# Patient Record
Sex: MALE | Race: WHITE | NOT HISPANIC OR LATINO | Employment: OTHER | URBAN - METROPOLITAN AREA
[De-identification: names, ages, dates, MRNs, and addresses within clinical notes are randomized per-mention and may not be internally consistent; named-entity substitution may affect disease eponyms.]

---

## 2017-06-30 ENCOUNTER — HOSPITAL ENCOUNTER (EMERGENCY)
Facility: HOSPITAL | Age: 57
Discharge: HOME | End: 2017-06-30
Attending: EMERGENCY MEDICINE | Admitting: EMERGENCY MEDICINE
Payer: COMMERCIAL

## 2017-06-30 VITALS
OXYGEN SATURATION: 97 % | DIASTOLIC BLOOD PRESSURE: 89 MMHG | SYSTOLIC BLOOD PRESSURE: 181 MMHG | TEMPERATURE: 97.3 F | RESPIRATION RATE: 18 BRPM | HEART RATE: 103 BPM | WEIGHT: 240.2 LBS

## 2017-06-30 DIAGNOSIS — E16.2 HYPOGLYCEMIA: Primary | ICD-10-CM

## 2017-06-30 LAB
ALBUMIN SERPL BCP-MCNC: 3 G/DL (ref 3.5–5)
ALP SERPL-CCNC: 102 U/L (ref 46–116)
ALT SERPL W P-5'-P-CCNC: 21 U/L (ref 12–78)
ANION GAP SERPL CALCULATED.3IONS-SCNC: 7 MMOL/L (ref 4–13)
APTT PPP: 28 SECONDS (ref 23–35)
AST SERPL W P-5'-P-CCNC: 40 U/L (ref 5–45)
ATRIAL RATE: 85 BPM
BACTERIA UR QL AUTO: ABNORMAL /HPF
BASOPHILS # BLD AUTO: 0.01 THOUSANDS/ΜL (ref 0–0.1)
BASOPHILS NFR BLD AUTO: 0 % (ref 0–1)
BILIRUB SERPL-MCNC: 0.4 MG/DL (ref 0.2–1)
BILIRUB UR QL STRIP: ABNORMAL
BUN SERPL-MCNC: 15 MG/DL (ref 5–25)
CALCIUM SERPL-MCNC: 8.5 MG/DL (ref 8.3–10.1)
CHLORIDE SERPL-SCNC: 107 MMOL/L (ref 100–108)
CK MB SERPL-MCNC: 3 NG/ML (ref 0–5)
CK MB SERPL-MCNC: <1 % (ref 0–2.5)
CK SERPL-CCNC: 328 U/L (ref 39–308)
CLARITY UR: CLEAR
CO2 SERPL-SCNC: 29 MMOL/L (ref 21–32)
COLOR UR: ABNORMAL
CREAT SERPL-MCNC: 1.14 MG/DL (ref 0.6–1.3)
EOSINOPHIL # BLD AUTO: 0.06 THOUSAND/ΜL (ref 0–0.61)
EOSINOPHIL NFR BLD AUTO: 1 % (ref 0–6)
ERYTHROCYTE [DISTWIDTH] IN BLOOD BY AUTOMATED COUNT: 15.3 % (ref 11.6–15.1)
GFR SERPL CREATININE-BSD FRML MDRD: >60 ML/MIN/1.73SQ M
GLUCOSE SERPL-MCNC: 137 MG/DL (ref 65–140)
GLUCOSE SERPL-MCNC: 53 MG/DL (ref 65–140)
GLUCOSE SERPL-MCNC: 63 MG/DL (ref 65–140)
GLUCOSE UR STRIP-MCNC: NEGATIVE MG/DL
HCT VFR BLD AUTO: 34.4 % (ref 36.5–49.3)
HGB BLD-MCNC: 11.5 G/DL (ref 12–17)
HGB UR QL STRIP.AUTO: ABNORMAL
INR PPP: 0.91 (ref 0.86–1.16)
KETONES UR STRIP-MCNC: ABNORMAL MG/DL
LEUKOCYTE ESTERASE UR QL STRIP: NEGATIVE
LYMPHOCYTES # BLD AUTO: 1.22 THOUSANDS/ΜL (ref 0.6–4.47)
LYMPHOCYTES NFR BLD AUTO: 13 % (ref 14–44)
MCH RBC QN AUTO: 30 PG (ref 26.8–34.3)
MCHC RBC AUTO-ENTMCNC: 33.4 G/DL (ref 31.4–37.4)
MCV RBC AUTO: 90 FL (ref 82–98)
MONOCYTES # BLD AUTO: 0.55 THOUSAND/ΜL (ref 0.17–1.22)
MONOCYTES NFR BLD AUTO: 6 % (ref 4–12)
NEUTROPHILS # BLD AUTO: 7.62 THOUSANDS/ΜL (ref 1.85–7.62)
NEUTS SEG NFR BLD AUTO: 80 % (ref 43–75)
NITRITE UR QL STRIP: NEGATIVE
NON-SQ EPI CELLS URNS QL MICRO: ABNORMAL /HPF
NRBC BLD AUTO-RTO: 0 /100 WBCS
P AXIS: 71 DEGREES
PH UR STRIP.AUTO: 5 [PH] (ref 4.5–8)
PLATELET # BLD AUTO: 182 THOUSANDS/UL (ref 149–390)
PMV BLD AUTO: 10.3 FL (ref 8.9–12.7)
POTASSIUM SERPL-SCNC: 3.9 MMOL/L (ref 3.5–5.3)
PR INTERVAL: 158 MS
PROT SERPL-MCNC: 6.2 G/DL (ref 6.4–8.2)
PROT UR STRIP-MCNC: >=300 MG/DL
PROTHROMBIN TIME: 12.3 SECONDS (ref 12.1–14.4)
QRS AXIS: 4 DEGREES
QRSD INTERVAL: 88 MS
QT INTERVAL: 374 MS
QTC INTERVAL: 445 MS
RBC # BLD AUTO: 3.83 MILLION/UL (ref 3.88–5.62)
RBC #/AREA URNS AUTO: ABNORMAL /HPF
SODIUM SERPL-SCNC: 143 MMOL/L (ref 136–145)
SP GR UR STRIP.AUTO: 1.02 (ref 1–1.03)
SPECIMEN SOURCE: NORMAL
T WAVE AXIS: 81 DEGREES
TROPONIN I BLD-MCNC: 0.01 NG/ML (ref 0–0.08)
UROBILINOGEN UR QL STRIP.AUTO: 1 E.U./DL
VENTRICULAR RATE: 85 BPM
WBC # BLD AUTO: 9.46 THOUSAND/UL (ref 4.31–10.16)
WBC #/AREA URNS AUTO: ABNORMAL /HPF

## 2017-06-30 PROCEDURE — 82550 ASSAY OF CK (CPK): CPT | Performed by: EMERGENCY MEDICINE

## 2017-06-30 PROCEDURE — 93005 ELECTROCARDIOGRAM TRACING: CPT

## 2017-06-30 PROCEDURE — 84484 ASSAY OF TROPONIN QUANT: CPT

## 2017-06-30 PROCEDURE — 81001 URINALYSIS AUTO W/SCOPE: CPT

## 2017-06-30 PROCEDURE — 85730 THROMBOPLASTIN TIME PARTIAL: CPT | Performed by: EMERGENCY MEDICINE

## 2017-06-30 PROCEDURE — 36415 COLL VENOUS BLD VENIPUNCTURE: CPT | Performed by: EMERGENCY MEDICINE

## 2017-06-30 PROCEDURE — 85610 PROTHROMBIN TIME: CPT | Performed by: EMERGENCY MEDICINE

## 2017-06-30 PROCEDURE — 85025 COMPLETE CBC W/AUTO DIFF WBC: CPT | Performed by: EMERGENCY MEDICINE

## 2017-06-30 PROCEDURE — 96374 THER/PROPH/DIAG INJ IV PUSH: CPT

## 2017-06-30 PROCEDURE — 82553 CREATINE MB FRACTION: CPT | Performed by: EMERGENCY MEDICINE

## 2017-06-30 PROCEDURE — 81002 URINALYSIS NONAUTO W/O SCOPE: CPT | Performed by: EMERGENCY MEDICINE

## 2017-06-30 PROCEDURE — 80053 COMPREHEN METABOLIC PANEL: CPT | Performed by: EMERGENCY MEDICINE

## 2017-06-30 PROCEDURE — 96361 HYDRATE IV INFUSION ADD-ON: CPT

## 2017-06-30 PROCEDURE — 99284 EMERGENCY DEPT VISIT MOD MDM: CPT

## 2017-06-30 PROCEDURE — 82948 REAGENT STRIP/BLOOD GLUCOSE: CPT

## 2017-06-30 RX ORDER — DEXTROSE MONOHYDRATE 25 G/50ML
25 INJECTION, SOLUTION INTRAVENOUS ONCE
Status: COMPLETED | OUTPATIENT
Start: 2017-06-30 | End: 2017-06-30

## 2017-06-30 RX ORDER — GABAPENTIN 100 MG/1
900 CAPSULE ORAL 3 TIMES DAILY
COMMUNITY
End: 2019-01-01 | Stop reason: SDUPTHER

## 2017-06-30 RX ORDER — ACETAMINOPHEN 325 MG/1
975 TABLET ORAL ONCE
Status: COMPLETED | OUTPATIENT
Start: 2017-06-30 | End: 2017-06-30

## 2017-06-30 RX ORDER — DEXTROSE MONOHYDRATE 25 G/50ML
INJECTION, SOLUTION INTRAVENOUS
Status: COMPLETED
Start: 2017-06-30 | End: 2017-06-30

## 2017-06-30 RX ADMIN — DEXTROSE MONOHYDRATE 25 ML: 25 INJECTION, SOLUTION INTRAVENOUS at 16:40

## 2017-06-30 RX ADMIN — ACETAMINOPHEN 975 MG: 325 TABLET, FILM COATED ORAL at 18:14

## 2017-06-30 RX ADMIN — SODIUM CHLORIDE 1000 ML: 0.9 INJECTION, SOLUTION INTRAVENOUS at 17:51

## 2017-06-30 RX ADMIN — SODIUM CHLORIDE 1000 ML: 0.9 INJECTION, SOLUTION INTRAVENOUS at 16:43

## 2018-06-01 ENCOUNTER — APPOINTMENT (EMERGENCY)
Dept: ULTRASOUND IMAGING | Facility: HOSPITAL | Age: 58
DRG: 501 | End: 2018-06-01
Payer: COMMERCIAL

## 2018-06-01 ENCOUNTER — HOSPITAL ENCOUNTER (INPATIENT)
Facility: HOSPITAL | Age: 58
LOS: 15 days | DRG: 501 | End: 2018-06-16
Attending: EMERGENCY MEDICINE | Admitting: INTERNAL MEDICINE
Payer: COMMERCIAL

## 2018-06-01 DIAGNOSIS — I50.33 ACUTE ON CHRONIC DIASTOLIC CONGESTIVE HEART FAILURE (HCC): ICD-10-CM

## 2018-06-01 DIAGNOSIS — J44.9 COPD (CHRONIC OBSTRUCTIVE PULMONARY DISEASE) (HCC): ICD-10-CM

## 2018-06-01 DIAGNOSIS — R60.9 EDEMA: ICD-10-CM

## 2018-06-01 DIAGNOSIS — N49.2 CELLULITIS OF SCROTUM: Primary | ICD-10-CM

## 2018-06-01 DIAGNOSIS — F17.200 CURRENT SMOKER: ICD-10-CM

## 2018-06-01 DIAGNOSIS — N43.3 HYDROCELE: ICD-10-CM

## 2018-06-01 DIAGNOSIS — R42 DIZZINESS: ICD-10-CM

## 2018-06-01 DIAGNOSIS — N17.9 AKI (ACUTE KIDNEY INJURY) (HCC): ICD-10-CM

## 2018-06-01 DIAGNOSIS — D50.9 IRON DEFICIENCY ANEMIA, UNSPECIFIED IRON DEFICIENCY ANEMIA TYPE: ICD-10-CM

## 2018-06-01 PROBLEM — I10 HYPERTENSION: Status: ACTIVE | Noted: 2018-06-01

## 2018-06-01 PROBLEM — E11.9 DIABETES (HCC): Status: ACTIVE | Noted: 2018-06-01

## 2018-06-01 PROBLEM — K59.00 CONSTIPATION: Status: ACTIVE | Noted: 2018-06-01

## 2018-06-01 PROBLEM — N50.89 SCROTAL EDEMA: Status: ACTIVE | Noted: 2018-06-01

## 2018-06-01 PROBLEM — R34 DECREASED URINE OUTPUT: Status: ACTIVE | Noted: 2018-06-01

## 2018-06-01 PROBLEM — Z91.89 AT RISK FOR NONCOMPLIANCE: Status: ACTIVE | Noted: 2018-06-01

## 2018-06-01 LAB
ALBUMIN SERPL BCP-MCNC: 2.5 G/DL (ref 3.5–5)
ALP SERPL-CCNC: 151 U/L (ref 46–116)
ALT SERPL W P-5'-P-CCNC: 16 U/L (ref 12–78)
ANION GAP SERPL CALCULATED.3IONS-SCNC: 9 MMOL/L (ref 4–13)
AST SERPL W P-5'-P-CCNC: 15 U/L (ref 5–45)
BACTERIA UR QL AUTO: ABNORMAL /HPF
BASOPHILS # BLD AUTO: 0.01 THOUSANDS/ΜL (ref 0–0.1)
BASOPHILS NFR BLD AUTO: 0 % (ref 0–1)
BILIRUB SERPL-MCNC: 0.19 MG/DL (ref 0.2–1)
BILIRUB UR QL STRIP: NEGATIVE
BUN SERPL-MCNC: 25 MG/DL (ref 5–25)
CALCIUM SERPL-MCNC: 8.4 MG/DL (ref 8.3–10.1)
CHLORIDE SERPL-SCNC: 107 MMOL/L (ref 100–108)
CLARITY UR: CLEAR
CO2 SERPL-SCNC: 26 MMOL/L (ref 21–32)
COLOR UR: YELLOW
CREAT SERPL-MCNC: 1.95 MG/DL (ref 0.6–1.3)
EOSINOPHIL # BLD AUTO: 0.1 THOUSAND/ΜL (ref 0–0.61)
EOSINOPHIL NFR BLD AUTO: 1 % (ref 0–6)
ERYTHROCYTE [DISTWIDTH] IN BLOOD BY AUTOMATED COUNT: 15.6 % (ref 11.6–15.1)
GFR SERPL CREATININE-BSD FRML MDRD: 38 ML/MIN/1.73SQ M
GLUCOSE SERPL-MCNC: 166 MG/DL (ref 65–140)
GLUCOSE SERPL-MCNC: 214 MG/DL (ref 65–140)
GLUCOSE UR STRIP-MCNC: ABNORMAL MG/DL
HCT VFR BLD AUTO: 29 % (ref 36.5–49.3)
HGB BLD-MCNC: 8.7 G/DL (ref 12–17)
HGB UR QL STRIP.AUTO: ABNORMAL
KETONES UR STRIP-MCNC: NEGATIVE MG/DL
LEUKOCYTE ESTERASE UR QL STRIP: NEGATIVE
LYMPHOCYTES # BLD AUTO: 1.14 THOUSANDS/ΜL (ref 0.6–4.47)
LYMPHOCYTES NFR BLD AUTO: 15 % (ref 14–44)
MCH RBC QN AUTO: 28.6 PG (ref 26.8–34.3)
MCHC RBC AUTO-ENTMCNC: 30 G/DL (ref 31.4–37.4)
MCV RBC AUTO: 95 FL (ref 82–98)
MONOCYTES # BLD AUTO: 0.65 THOUSAND/ΜL (ref 0.17–1.22)
MONOCYTES NFR BLD AUTO: 8 % (ref 4–12)
NEUTROPHILS # BLD AUTO: 5.95 THOUSANDS/ΜL (ref 1.85–7.62)
NEUTS SEG NFR BLD AUTO: 76 % (ref 43–75)
NITRITE UR QL STRIP: NEGATIVE
NON-SQ EPI CELLS URNS QL MICRO: ABNORMAL /HPF
NRBC BLD AUTO-RTO: 0 /100 WBCS
PH UR STRIP.AUTO: 6.5 [PH] (ref 4.5–8)
PLATELET # BLD AUTO: 225 THOUSANDS/UL (ref 149–390)
PMV BLD AUTO: 10 FL (ref 8.9–12.7)
POTASSIUM SERPL-SCNC: 4.9 MMOL/L (ref 3.5–5.3)
PROT SERPL-MCNC: 6.8 G/DL (ref 6.4–8.2)
PROT UR STRIP-MCNC: >=300 MG/DL
RBC # BLD AUTO: 3.04 MILLION/UL (ref 3.88–5.62)
RBC #/AREA URNS AUTO: ABNORMAL /HPF
SODIUM SERPL-SCNC: 142 MMOL/L (ref 136–145)
SP GR UR STRIP.AUTO: 1.01 (ref 1–1.03)
UROBILINOGEN UR QL STRIP.AUTO: 0.2 E.U./DL
WBC # BLD AUTO: 7.85 THOUSAND/UL (ref 4.31–10.16)
WBC #/AREA URNS AUTO: ABNORMAL /HPF

## 2018-06-01 PROCEDURE — 83036 HEMOGLOBIN GLYCOSYLATED A1C: CPT | Performed by: PHYSICIAN ASSISTANT

## 2018-06-01 PROCEDURE — 99285 EMERGENCY DEPT VISIT HI MDM: CPT

## 2018-06-01 PROCEDURE — 87040 BLOOD CULTURE FOR BACTERIA: CPT | Performed by: EMERGENCY MEDICINE

## 2018-06-01 PROCEDURE — 76870 US EXAM SCROTUM: CPT

## 2018-06-01 PROCEDURE — 99223 1ST HOSP IP/OBS HIGH 75: CPT | Performed by: PHYSICIAN ASSISTANT

## 2018-06-01 PROCEDURE — 80053 COMPREHEN METABOLIC PANEL: CPT | Performed by: EMERGENCY MEDICINE

## 2018-06-01 PROCEDURE — 36415 COLL VENOUS BLD VENIPUNCTURE: CPT | Performed by: EMERGENCY MEDICINE

## 2018-06-01 PROCEDURE — 85025 COMPLETE CBC W/AUTO DIFF WBC: CPT | Performed by: EMERGENCY MEDICINE

## 2018-06-01 PROCEDURE — 82948 REAGENT STRIP/BLOOD GLUCOSE: CPT

## 2018-06-01 PROCEDURE — 81001 URINALYSIS AUTO W/SCOPE: CPT

## 2018-06-01 RX ORDER — ATORVASTATIN CALCIUM 40 MG/1
40 TABLET, FILM COATED ORAL
COMMUNITY

## 2018-06-01 RX ORDER — AMITRIPTYLINE HYDROCHLORIDE 75 MG/1
75 TABLET, FILM COATED ORAL
COMMUNITY
Start: 2011-08-10

## 2018-06-01 RX ORDER — ONDANSETRON 2 MG/ML
4 INJECTION INTRAMUSCULAR; INTRAVENOUS EVERY 6 HOURS PRN
Status: DISCONTINUED | OUTPATIENT
Start: 2018-06-01 | End: 2018-06-16 | Stop reason: HOSPADM

## 2018-06-01 RX ORDER — NICOTINE 21 MG/24HR
1 PATCH, TRANSDERMAL 24 HOURS TRANSDERMAL DAILY
Status: DISCONTINUED | OUTPATIENT
Start: 2018-06-02 | End: 2018-06-16 | Stop reason: HOSPADM

## 2018-06-01 RX ORDER — INSULIN GLARGINE 100 [IU]/ML
21 INJECTION, SOLUTION SUBCUTANEOUS EVERY MORNING
Status: DISCONTINUED | OUTPATIENT
Start: 2018-06-02 | End: 2018-06-03

## 2018-06-01 RX ORDER — ATORVASTATIN CALCIUM 40 MG/1
40 TABLET, FILM COATED ORAL
Status: DISCONTINUED | OUTPATIENT
Start: 2018-06-02 | End: 2018-06-16 | Stop reason: HOSPADM

## 2018-06-01 RX ORDER — INSULIN GLARGINE 100 [IU]/ML
28 INJECTION, SOLUTION SUBCUTANEOUS
Status: DISCONTINUED | OUTPATIENT
Start: 2018-06-01 | End: 2018-06-03

## 2018-06-01 RX ORDER — ASPIRIN 81 MG/1
81 TABLET ORAL DAILY
Status: DISCONTINUED | OUTPATIENT
Start: 2018-06-02 | End: 2018-06-16 | Stop reason: HOSPADM

## 2018-06-01 RX ORDER — GABAPENTIN 300 MG/1
900 CAPSULE ORAL 3 TIMES DAILY
Status: DISCONTINUED | OUTPATIENT
Start: 2018-06-01 | End: 2018-06-16 | Stop reason: HOSPADM

## 2018-06-01 RX ORDER — FLUTICASONE FUROATE AND VILANTEROL 200; 25 UG/1; UG/1
POWDER RESPIRATORY (INHALATION)
COMMUNITY
Start: 2016-11-01

## 2018-06-01 RX ORDER — SODIUM CHLORIDE 9 MG/ML
50 INJECTION, SOLUTION INTRAVENOUS CONTINUOUS
Status: DISCONTINUED | OUTPATIENT
Start: 2018-06-01 | End: 2018-06-03

## 2018-06-01 RX ORDER — GABAPENTIN 300 MG/1
900 CAPSULE ORAL EVERY 8 HOURS
Status: ON HOLD | COMMUNITY
End: 2018-06-01

## 2018-06-01 RX ORDER — HEPARIN SODIUM 5000 [USP'U]/ML
5000 INJECTION, SOLUTION INTRAVENOUS; SUBCUTANEOUS EVERY 8 HOURS SCHEDULED
Status: DISCONTINUED | OUTPATIENT
Start: 2018-06-01 | End: 2018-06-16 | Stop reason: HOSPADM

## 2018-06-01 RX ORDER — INSULIN GLARGINE 100 [IU]/ML
20 INJECTION, SOLUTION SUBCUTANEOUS
COMMUNITY
Start: 2016-01-10 | End: 2018-06-16 | Stop reason: HOSPADM

## 2018-06-01 RX ORDER — LISINOPRIL 20 MG/1
TABLET ORAL
COMMUNITY
Start: 2016-10-18

## 2018-06-01 RX ADMIN — CEFAZOLIN SODIUM 2000 MG: 10 INJECTION, POWDER, FOR SOLUTION INTRAVENOUS at 19:18

## 2018-06-01 NOTE — ED NOTES
Assumed care of pt from Jeffrey Neves RN at this time, pt just left dept for US        Lam Walden RN  06/01/18 9373

## 2018-06-01 NOTE — ED PROVIDER NOTES
History  Chief Complaint   Patient presents with    Testicle Swelling     patient c/o of testicle swelling at started about 2 weeks ago; patient states that he is having a hard time going to the bathroom     53-year-old male with a history of hypertension and diabetes presents to the emergency depart with a 2 week history of progressively worsening swelling of both his scrotum  He also has been experiencing pain in the scrotal area  He states that the swelling has gone around his penis and he is having a difficult time controlling his urine flow  He has had no fevers or chills  No abdominal pain  No trauma to the area  No prior history of similar episodes  History provided by:  Patient   used: No    Testicle Pain   Location:  Bilateral scrotum  Severity:  Unable to specify  Onset quality:  Gradual  Duration:  2 weeks  Timing:  Constant  Progression:  Worsening  Chronicity:  New  Relieved by:  Nothing tried  Ineffective treatments:  None tried  Associated symptoms: no abdominal pain, no fatigue, no fever, no headaches, no nausea and no rash        Prior to Admission Medications   Prescriptions Last Dose Informant Patient Reported? Taking?    LISINOPRIL PO   Yes No   Sig: Take by mouth daily   amitriptyline (ELAVIL) 75 mg tablet   Yes Yes   Sig: Take 75 mg by mouth   aspirin 81 MG tablet   Yes Yes   Sig: one tab po daily   atorvastatin (LIPITOR) 40 mg tablet   Yes No   Sig: Take 40 mg by mouth   fluticasone-vilanterol (BREO ELLIPTA) 200-25 MCG/INH inhaler   Yes Yes   gabapentin (NEURONTIN) 100 mg capsule   Yes No   Sig: Take 100 mg by mouth 3 (three) times a day   gabapentin (NEURONTIN) 300 mg capsule   Yes No   Sig: Take 900 mg by mouth every 8 (eight) hours   insulin NPH-insulin regular (NovoLIN 70/30) 100 units/mL subcutaneous injection   Yes Yes   Sig: Inject 40 Units under the skin every morning   insulin NPH-insulin regular (NovoLIN 70/30) 100 units/mL subcutaneous injection Yes Yes   Sig: Inject 30 Units under the skin every evening   insulin glargine (LANTUS) 100 units/mL subcutaneous injection   Yes Yes   Sig: Inject 10 Units under the skin   insulin regular (HumuLIN R,NovoLIN R) 100 units/mL injection   Yes No   Sig: Inject under the skin once   lisinopril (ZESTRIL) 20 mg tablet   Yes Yes   metFORMIN (GLUCOPHAGE) 1000 MG tablet   Yes No   Sig: Take 1,000 mg by mouth 2 (two) times a day with meals      Facility-Administered Medications: None       Past Medical History:   Diagnosis Date    [facility] mellitus ([facility] [address] )     Hypertension     Neuropathy        History reviewed  No pertinent surgical history  History reviewed  No pertinent family history  I have reviewed and agree with the history as documented  Social History   Substance Use Topics    Smoking status: Current Every Day Smoker     Packs/day: 1 00    Smokeless tobacco: Never Used    Alcohol use No        Review of Systems   Constitutional: Negative  Negative for chills, fatigue and fever  HENT: Negative  Eyes: Negative  Respiratory: Negative  Cardiovascular: Negative  Gastrointestinal: Negative  Negative for abdominal pain and nausea  Genitourinary: Positive for difficulty urinating, scrotal swelling and testicular pain  Negative for decreased urine volume, discharge, dysuria, flank pain, frequency, genital sores, hematuria, penile pain, penile swelling and urgency  Musculoskeletal: Negative for neck pain  Skin: Negative  Negative for rash  Allergic/Immunologic: Negative  Neurological: Negative  Negative for weakness, numbness and headaches  Hematological: Negative  Psychiatric/Behavioral: Negative  All other systems reviewed and are negative  Physical Exam  Physical Exam   Constitutional: He is oriented to person, place, and time  He appears well-developed and well-nourished  Non-toxic appearance  He does not have a sickly appearance  He does not appear ill  No distress  HENT:   Head: Normocephalic and atraumatic  Right Ear: External ear normal    Left Ear: External ear normal    Mouth/Throat: Oropharynx is clear and moist    Eyes: Conjunctivae are normal  Pupils are equal, round, and reactive to light  No scleral icterus  Cardiovascular: Regular rhythm and normal heart sounds  Tachycardia present  Pulmonary/Chest: Effort normal and breath sounds normal    Abdominal: Soft  Bowel sounds are normal  He exhibits no distension and no mass  There is no tenderness  No hernia  Genitourinary:   Genitourinary Comments: Significant swelling of bilateral scrotum with erythema  No abscess, drainage  There is significant edema surrounding most of the shaft and glans of the penis  There is tenderness of the entire scrotal area bilaterally   Musculoskeletal: Normal range of motion  He exhibits no edema, tenderness or deformity  Neurological: He is alert and oriented to person, place, and time  He has normal strength and normal reflexes  He exhibits normal muscle tone  Skin: Skin is warm and dry  No rash noted  He is not diaphoretic  No erythema  No pallor  Psychiatric: He has a normal mood and affect  Nursing note and vitals reviewed        Vital Signs  ED Triage Vitals [06/01/18 1628]   Temperature Pulse Respirations Blood Pressure SpO2   99 7 °F (37 6 °C) 105 22 151/70 96 %      Temp Source Heart Rate Source Patient Position - Orthostatic VS BP Location FiO2 (%)   Temporal Monitor Sitting Right arm --      Pain Score       --           Vitals:    06/01/18 1628   BP: 151/70   Pulse: 105   Patient Position - Orthostatic VS: Sitting       Visual Acuity      ED Medications  Medications   ceFAZolin (ANCEF) 2,000 mg in sodium chloride 0 9 % 50 mL IVPB (not administered)       Diagnostic Studies  Results Reviewed     Procedure Component Value Units Date/Time    CBC and differential [[id number]]     Lab Status:  No result Specimen:  Blood     Comprehensive metabolic panel [[id number]] Lab Status:  No result Specimen:  Blood     POCT urinalysis dipstick [[id number]]     Lab Status:  No result Specimen:  Urine                  US scrotum and testicles   Final Result by Vesna Maldonado MD (06/01 1744)       Severe scrotal soft tissue swelling/edema suggesting cellulitis  No drainable fluid collection identified  Small bilateral complex debris-containing hydroceles  Workstation performed: [id number]                    Procedures  Procedures       Phone Contacts  ED Phone Contact    ED Course                               MDM  Number of Diagnoses or Management Options  Diagnosis management comments: 56-year-old male presents with a 2 week history steadily increasing bilateral scrotal swelling, pain  The swelling has over taken the shaft and most of the glans of the penis so he is having difficulty controlling his urine  He has had no fevers or chills  On exam he does not appear acutely ill  Examination of the scrotum reveals significant swelling of both scrotum and there is tenderness diffusely  There is significant swelling overlying the penis  No palpable abscess or breakdown of skin  Will do CBC, CMP  Will give a dose of IV antibiotics  Will ultrasound scrotum to rule out abscess verses mass verses hydrocele    Given the patient is diabetic will admit for further workup and treatment       Amount and/or Complexity of Data Reviewed  Clinical lab tests: ordered and reviewed  Tests in the radiology section of CPT®: ordered and reviewed  Independent visualization of images, tracings, or specimens: yes      CritCare Time    Disposition  Final diagnoses:   Cellulitis of scrotum     Time reflects when diagnosis was documented in both MDM as applicable and the Disposition within this note     Time User Action Codes Description Comment    6/1/2018  6:10 PM Eryn GREENE Add [N49 2] Cellulitis of scrotum       ED Disposition     ED Disposition Condition Comment    Admit  Case was discussed with dr roldan and the patient's admission status was agreed to be Admission Status: inpatient status to the service of Dr Marie Serrano   Follow-up Information    None         Patient's Medications   Discharge Prescriptions    No medications on file     No discharge procedures on file      ED Provider  Electronically Signed by           Anni Rodriguez DO  06/01/18 8102

## 2018-06-01 NOTE — ED NOTES
Pt returned from [address] [place] [address] [address]       Madison Martinez RN  06/01/18 5137

## 2018-06-02 PROBLEM — D64.9 ANEMIA: Status: ACTIVE | Noted: 2018-06-02

## 2018-06-02 LAB
CREAT UR-MCNC: 56.5 MG/DL
EST. AVERAGE GLUCOSE BLD GHB EST-MCNC: 169 MG/DL
GLUCOSE SERPL-MCNC: 109 MG/DL (ref 65–140)
GLUCOSE SERPL-MCNC: 134 MG/DL (ref 65–140)
GLUCOSE SERPL-MCNC: 202 MG/DL (ref 65–140)
GLUCOSE SERPL-MCNC: 96 MG/DL (ref 65–140)
HBA1C MFR BLD: 7.5 % (ref 4.2–6.3)
PROT UR-MCNC: 252 MG/DL
PROT/CREAT UR: 4.46 MG/G{CREAT} (ref 0–0.1)

## 2018-06-02 PROCEDURE — 99254 IP/OBS CNSLTJ NEW/EST MOD 60: CPT | Performed by: UROLOGY

## 2018-06-02 PROCEDURE — 99232 SBSQ HOSP IP/OBS MODERATE 35: CPT | Performed by: INTERNAL MEDICINE

## 2018-06-02 PROCEDURE — 82948 REAGENT STRIP/BLOOD GLUCOSE: CPT

## 2018-06-02 PROCEDURE — 82570 ASSAY OF URINE CREATININE: CPT | Performed by: PHYSICIAN ASSISTANT

## 2018-06-02 PROCEDURE — 84156 ASSAY OF PROTEIN URINE: CPT | Performed by: PHYSICIAN ASSISTANT

## 2018-06-02 RX ORDER — CARVEDILOL 3.12 MG/1
6.25 TABLET ORAL 2 TIMES DAILY WITH MEALS
Status: DISCONTINUED | OUTPATIENT
Start: 2018-06-02 | End: 2018-06-16 | Stop reason: HOSPADM

## 2018-06-02 RX ORDER — OXYCODONE HYDROCHLORIDE 5 MG/1
5 TABLET ORAL EVERY 4 HOURS PRN
Status: DISCONTINUED | OUTPATIENT
Start: 2018-06-02 | End: 2018-06-16 | Stop reason: HOSPADM

## 2018-06-02 RX ORDER — ACETAMINOPHEN 325 MG/1
650 TABLET ORAL EVERY 6 HOURS PRN
Status: DISCONTINUED | OUTPATIENT
Start: 2018-06-02 | End: 2018-06-16 | Stop reason: HOSPADM

## 2018-06-02 RX ORDER — POLYETHYLENE GLYCOL 3350 17 G/17G
17 POWDER, FOR SOLUTION ORAL DAILY
Status: DISCONTINUED | OUTPATIENT
Start: 2018-06-03 | End: 2018-06-16 | Stop reason: HOSPADM

## 2018-06-02 RX ORDER — DOCUSATE SODIUM 100 MG/1
100 CAPSULE, LIQUID FILLED ORAL 2 TIMES DAILY
Status: DISCONTINUED | OUTPATIENT
Start: 2018-06-02 | End: 2018-06-12

## 2018-06-02 RX ADMIN — CEFAZOLIN SODIUM 1000 MG: 1 SOLUTION INTRAVENOUS at 03:21

## 2018-06-02 RX ADMIN — FLUTICASONE PROPIONATE AND SALMETEROL 1 PUFF: 50; 250 POWDER RESPIRATORY (INHALATION) at 00:50

## 2018-06-02 RX ADMIN — CARVEDILOL 6.25 MG: 3.12 TABLET, FILM COATED ORAL at 17:02

## 2018-06-02 RX ADMIN — INSULIN GLARGINE 21 UNITS: 100 INJECTION, SOLUTION SUBCUTANEOUS at 08:52

## 2018-06-02 RX ADMIN — DESMOPRESSIN ACETATE 40 MG: 0.2 TABLET ORAL at 17:02

## 2018-06-02 RX ADMIN — INSULIN GLARGINE 28 UNITS: 100 INJECTION, SOLUTION SUBCUTANEOUS at 21:28

## 2018-06-02 RX ADMIN — INSULIN LISPRO 7 UNITS: 100 INJECTION, SOLUTION INTRAVENOUS; SUBCUTANEOUS at 08:53

## 2018-06-02 RX ADMIN — GABAPENTIN 900 MG: 300 CAPSULE ORAL at 17:00

## 2018-06-02 RX ADMIN — HEPARIN SODIUM 5000 UNITS: 5000 INJECTION, SOLUTION INTRAVENOUS; SUBCUTANEOUS at 05:06

## 2018-06-02 RX ADMIN — HEPARIN SODIUM 5000 UNITS: 5000 INJECTION, SOLUTION INTRAVENOUS; SUBCUTANEOUS at 21:27

## 2018-06-02 RX ADMIN — INSULIN LISPRO 4 UNITS: 100 INJECTION, SOLUTION INTRAVENOUS; SUBCUTANEOUS at 00:51

## 2018-06-02 RX ADMIN — ASPIRIN 81 MG: 81 TABLET, COATED ORAL at 08:52

## 2018-06-02 RX ADMIN — ACETAMINOPHEN 650 MG: 325 TABLET ORAL at 21:26

## 2018-06-02 RX ADMIN — SODIUM CHLORIDE 50 ML/HR: 0.9 INJECTION, SOLUTION INTRAVENOUS at 00:51

## 2018-06-02 RX ADMIN — CEFAZOLIN SODIUM 1000 MG: 1 SOLUTION INTRAVENOUS at 12:03

## 2018-06-02 RX ADMIN — GABAPENTIN 900 MG: 300 CAPSULE ORAL at 00:49

## 2018-06-02 RX ADMIN — AMITRIPTYLINE HYDROCHLORIDE 75 MG: 50 TABLET, FILM COATED ORAL at 21:23

## 2018-06-02 RX ADMIN — CEFAZOLIN SODIUM 1000 MG: 1 SOLUTION INTRAVENOUS at 19:58

## 2018-06-02 RX ADMIN — INSULIN LISPRO 4 UNITS: 100 INJECTION, SOLUTION INTRAVENOUS; SUBCUTANEOUS at 21:29

## 2018-06-02 RX ADMIN — HEPARIN SODIUM 5000 UNITS: 5000 INJECTION, SOLUTION INTRAVENOUS; SUBCUTANEOUS at 13:58

## 2018-06-02 RX ADMIN — DOCUSATE SODIUM 100 MG: 100 CAPSULE, LIQUID FILLED ORAL at 21:22

## 2018-06-02 RX ADMIN — INSULIN GLARGINE 28 UNITS: 100 INJECTION, SOLUTION SUBCUTANEOUS at 00:51

## 2018-06-02 RX ADMIN — FLUTICASONE PROPIONATE AND SALMETEROL 1 PUFF: 50; 250 POWDER RESPIRATORY (INHALATION) at 08:52

## 2018-06-02 RX ADMIN — FLUTICASONE PROPIONATE AND SALMETEROL 1 PUFF: 50; 250 POWDER RESPIRATORY (INHALATION) at 21:32

## 2018-06-02 RX ADMIN — GABAPENTIN 900 MG: 300 CAPSULE ORAL at 08:52

## 2018-06-02 RX ADMIN — GABAPENTIN 900 MG: 300 CAPSULE ORAL at 21:24

## 2018-06-02 RX ADMIN — HEPARIN SODIUM 5000 UNITS: 5000 INJECTION, SOLUTION INTRAVENOUS; SUBCUTANEOUS at 00:49

## 2018-06-02 RX ADMIN — AMITRIPTYLINE HYDROCHLORIDE 75 MG: 50 TABLET, FILM COATED ORAL at 00:48

## 2018-06-02 NOTE — CASE MANAGEMENT
Thank you,  [address] [facility] in the [facility] by Darrion Armstrong for 2017  Network Utilization Review Department  Phone: [phone]; Fax [phone]  ATTENTION: The Network Utilization Review Department is now centralized for our 7 Facilities  Make a note that we have a new phone and fax numbers for our Department  Please call with any questions or concerns to [phone] and carefully follow the prompts so that you are directed to the right person  All voicemails are confidential  Fax any determinations, approvals, denials, and requests for initial or continue stay review clinical to [phone]  Due to HIGH CALL volume, it would be easier if you could please send faxed requests to expedite your requests and in part, help us provide discharge notifications faster     ====================================================================    Initial Clinical Review    Admission: Date/Time/Statement: 6/1/18 @ 1810   Orders Placed This Encounter   Procedures    Inpatient Admission (expected length of stay for this patient is greater than two midnights)     Standing Status:   Standing     Number of Occurrences:   1     Order Specific Question:   Admitting Physician     Answer:   BEREKET SHETH [[id number]]     Order Specific Question:   Level of Care     Answer:   Med Surg [[id number]]     Order Specific Question:   Estimated length of stay     Answer:   More than 2 Midnights     Order Specific Question:   Certification     Answer:   I certify that inpatient services are medically necessary for this patient for a duration of greater than two midnights  See H&P and MD Progress Notes for additional information about the patient's course of treatment           ED: Date/Time/Mode of Arrival:   ED Arrival Information     Expected Arrival Acuity Means of Arrival Escorted By Service Admission Type    - 6/1/2018 16:16 Urgent Walk-In Self General Medicine Urgent    Arrival Complaint    Medical Problem          Chief Complaint:   Chief Complaint   Patient presents with    Testicle Swelling     patient c/o of testicle swelling at started about 2 weeks ago; patient states that he is having a hard time going to the bathroom       History of Illness:   Maynor Flores is a 56 y o  male who presents with PMH of diabetes, hypertension current smoker coming in for testicular/scrotal edema, erythema a in tenderness x1 month  Patient reports over the last month he has noticed a progressive swelling and redness and tenderness of the scrotum  He reports his painful to touch, and as the month as progresses become so swollen that he has difficulty retracting his penis  He also reports that he has been having difficulty urinating by way of controlling his stream but has also noticed a decrease in his urine output  He has also noticed constipation over the last week although he is passing gas      He notes that he went to his PCP 3 days ago and reports that [facility] was told to watch it closely and if the redness got any worse or the pain gets any worse to go to the ER  He reports he is coming today because he could not stand the pain any longer       ED Vital Signs:   ED Triage Vitals   Temperature Pulse Respirations Blood Pressure SpO2   06/01/18 1628 06/01/18 1628 06/01/18 1628 06/01/18 1628 06/01/18 1628   99 7 °F (37 6 °C) 105 22 151/70 96 %      Temp Source Heart Rate Source Patient Position - Orthostatic VS BP Location FiO2 (%)   06/01/18 1628 06/01/18 1628 06/01/18 1628 06/01/18 1628 --   Temporal Monitor Sitting Right arm       Pain Score       06/01/18 1957       6        Wt Readings from Last 1 Encounters:   06/01/18 117 kg (257 lb 15 oz)     Abnormal Labs/Diagnostic Test Results:   WBC Thousand/uL 7 85   HEMOGLOBIN g/dL 8 7*   HEMATOCRIT % 29 0*   PLATELETS Thousands/uL 225     SODIUM mmol/L 142   POTASSIUM mmol/L 4 9   CHLORIDE mmol/L 107   CO2 mmol/L 26   BUN mg/dL 25   CREATININE mg/dL 1 95*   CALCIUM mg/dL 8  4   TOTAL PROTEIN g/dL 6 8   BILIRUBIN TOTAL mg/dL 0 19*   ALK PHOS U/L 151*   ALT U/L 16   AST U/L 15   GLUCOSE RANDOM mg/dL 166*     Us Scrotum And Testicles: Severe scrotal soft tissue swelling/edema suggesting cellulitis  No drainable fluid collection identified  Small bilateral complex debris-containing hydroceles  ED Treatment:   Medication Administration from 06/01/2018 1616 to 06/01/2018 2005       Date/Time Order Dose Route Action Action by Comments     06/01/2018 1948 ceFAZolin (ANCEF) 2,000 mg in sodium chloride 0 9 % 50 mL IVPB 0 mg Intravenous Stopped Peggy Jalloh RN      06/01/2018 1918 ceFAZolin (ANCEF) 2,000 mg in sodium chloride 0 9 % 50 mL IVPB 2,000 mg Intravenous New Bag Lynsey Zuniga RN           Past Medical/Surgical History:   Past Medical History:   Diagnosis Date    Diabetes mellitus ([facility] [address] )     Hyperlipidemia     Hypertension     Neuropathy        Admitting Diagnosis: Cellulitis of scrotum [N49 2]  Testicle swelling [N50 89]    Age/Sex: 56 y o  male    Assessment/Plan:    Cellulitis of scrotum  Active Problems:    Hydrocele    DILCIA (acute kidney injury) ([facility] [address] )    Constipation    Diabetes ([facility] [address] )    Hypertension    Current smoker    At risk for noncompliance               * Cellulitis of scrotum   Assessment & Plan     Ongoing for 1 month    Pt reports he saw his pcp 3 days ago who per the pt continued to monitor it closely and recommend coming in to ER if worsens in erythema or pain  No evidence of gas concern for gangrene or extension into medial intertriginous areas or perineum concerning for fornier's  U/S of scrotum demonstrates Severe scrotal soft tissue swelling/edema suggesting cellulitis   No drainable fluid collection identified w/small b/l complex debris filled hydrocele, w/unremarkable epididymides, testicles and doppler flow  rec'd ancef in ED will continue w/renally dosed ancef  Consult urology          DILCIA (acute kidney injury) [facility])   Assessment & Plan     In the setting of poorly controlled DM and w/hypoalbuminemia and  >300 proteinuria  Given substantial scrotal edema and decreased uop check stat pvr/bladder scan to r/o bladder outlet obstruction  Will check urine protein creatinine ratio, start gentle IVF hydration  Repeat bmp in am          Hydrocele   Assessment & Plan     Unclear etiology, however demonstrated genital u/s as noted above  Will appreciate urology recommendations given swollen and rather firm testicles          At risk for noncompliance   Assessment & Plan     Hx of homelessness, pt appears to be now staying w/friend  Will consult CM to assess for any risks for noncompliance for dc planning          Current smoker   Assessment & Plan     Nicotine patch provided cessation encouraged          Hypertension   Assessment & Plan     Hold ace given DILCIA  Start hydralazine 10mg prn sbp >160mmHg          Diabetes (HCC)   Assessment & Plan     Insulin dependent dm  Convert op novolin 70/30 (40 q am 30 q pm ) to lantus 28 qhs and 21 q am w/humalog 7 IU TID AC  Continue SSI and POC BS, check hgb a1c          Constipation   Assessment & Plan     Over last 10 days  No abdominal pain or bloating  Pt passing gas  Given decreased urine output raises concern for bladder obstruction  Start docusate 100mg po bid and miralax prn             ·       VTE Prophylaxis: Heparin  / reason for no mechanical VTE prophylaxis none      Anticipated Length of Stay:  Patient will be admitted on an Inpatient basis with an anticipated length of stay of  Greater  Than 2 midnights     Justification for Hospital Stay: cellulitis of scrotum requiring iv abx and close monitoring       Admission Orders:  Scheduled Meds:   Current Facility-Administered Medications:  amitriptyline 75 mg Oral HS Jonna Raya PA-C    aspirin 81 mg Oral Daily Jonna Raya PA-C    atorvastatin 40 mg Oral Daily With Lynette Don CHRIS Raya    carvedilol 6 25 mg Oral BID With Mónica Fournier DO    cefazolin 1,000 mg Intravenous Q8H Jonna Raya PA-C Last Rate: 1,000 mg (06/02/18 1203)   fluticasone-salmeterol 1 puff Inhalation Q12H [facility] Jonna Raya PA-C    gabapentin 900 mg Oral TID Jonna Raya PA-C    heparin (porcine) 5,000 Units Subcutaneous Q8H [facility] Jonna Raya PA-C    insulin glargine 21 Units Subcutaneous QAM Jonna Raya PA-C    insulin glargine 28 Units Subcutaneous HS Jonna Raya PA-C    insulin lispro 2-12 Units Subcutaneous 4x Daily (AC & HS) Jonna Raya PA-C    insulin lispro 7 Units Subcutaneous TID With Meals Jonna Raya PA-C    nicotine 1 patch Transdermal Daily Jonna Raya PA-C    ondansetron 4 mg Intravenous Q6H PRN Jonna Raya PA-C    sodium chloride 50 mL/hr Intravenous Continuous Jonna Raya PA-C Last Rate: 50 mL/hr (06/02/18 0051)     Continuous Infusions:   sodium chloride 50 mL/hr Last Rate: 50 mL/hr (06/02/18 0051)

## 2018-06-02 NOTE — H&P
H&P- Josh Angelaphus 1960, 56 y o  male MRN: [id number]    Unit/Bed#: E2 -01 Encounter: [id number]    Primary Care Provider: Manuel Ness DO   Date and time admitted to hospital: 6/1/2018  4:39 PM    History and Physical - [facility]    Patient Information: Josh Calderon 56 y o  male MRN: [id number]  Unit/Bed#: E2 -01 Encounter: [id number]  Admitting Physician: Gilmar Stanton PA-C  PCP: Manuel Ness DO  Date of Admission:  06/01/18    Assessment/Plan:    Hospital Problem List:     Principal Problem:    Cellulitis of scrotum  Active Problems:    Hydrocele    DILCIA (acute kidney injury) ([facility] [address] )    Constipation    Diabetes ([facility] [address] )    Hypertension    Current smoker    At risk for noncompliance        * Cellulitis of scrotum   Assessment & Plan    Ongoing for 1 month  Pt reports he saw his pcp 3 days ago who per the pt continued to monitor it closely and recommend coming in to ER if worsens in erythema or pain  No evidence of gas concern for gangrene or extension into medial intertriginous areas or perineum concerning for fornier's  U/S of scrotum demonstrates Severe scrotal soft tissue swelling/edema suggesting cellulitis  No drainable fluid collection identified w/small b/l complex debris filled hydrocele, w/unremarkable epididymides, testicles and doppler flow  rec'd ancef in ED will continue w/renally dosed ancef  Consult urology        DILCIA (acute kidney injury) ([facility] [address] )   Assessment & Plan    In the setting of poorly controlled DM and w/hypoalbuminemia and  >300 proteinuria  Given substantial scrotal edema and decreased uop check stat pvr/bladder scan to r/o bladder outlet obstruction  Will check urine protein creatinine ratio, start gentle IVF hydration      Repeat bmp in am        Hydrocele   Assessment & Plan    Unclear etiology, however demonstrated genital u/s as noted above  Will appreciate urology recommendations given swollen and rather firm testicles        At risk for noncompliance   Assessment & Plan    Hx of homelessness, pt appears to be now staying w/friend  Will consult CM to assess for any risks for noncompliance for dc planning        Current smoker   Assessment & Plan    Nicotine patch provided cessation encouraged        Hypertension   Assessment & Plan    Hold ace given DILCIA  Start hydralazine 10mg prn sbp >160mmHg        Diabetes (HCC)   Assessment & Plan    Insulin dependent dm  Convert op novolin 70/30 (40 q am 30 q pm ) to lantus 28 qhs and 21 q am w/humalog 7 IU TID AC  Continue SSI and POC BS, check hgb a1c        Constipation   Assessment & Plan    Over last 10 days  No abdominal pain or bloating  Pt passing gas  Given decreased urine output raises concern for bladder obstruction  Start docusate 100mg po bid and miralax prn          ·     VTE Prophylaxis: Heparin  / reason for no mechanical VTE prophylaxis none   Code Status: FC  POLST: There is no POLST form on file for this patient (pre-hospital)    Anticipated Length of Stay:  Patient will be admitted on an Inpatient basis with an anticipated length of stay of  Greater  Than 2 midnights  Justification for Hospital Stay: cellulitis of scrotum requiring iv abx and close monitoring    Total Time for Visit, including Counseling / Coordination of Care: 45 minutes  Greater than 50% of this total time spent on direct patient counseling and coordination of care  Chief Complaint:   Scrotal pain/redness/swelling x 1 mo    History of Present Illness:    Joshua Lewis is a 56 y o  male who presents with PMH of diabetes, hypertension current smoker coming in for testicular/scrotal edema, erythema a in tenderness x1 month  Patient reports over the last month he has noticed a progressive swelling and redness and tenderness of the scrotum  He reports his painful to touch, and as the month as progresses become so swollen that he has difficulty retracting his penis    He also reports that he has been having difficulty urinating by way of controlling his stream but has also noticed a decrease in his urine output  He has also noticed constipation over the last week although he is passing gas  He has not noticed any purulent drainage, any pain or erythema of the pain now shaft or discharge from the meatus  He has not noticed any redness or pain in the perineum or pain with bowel movements  He has no nausea vomiting fevers or chills his appetite is intact  He notes that he went to his PCP 3 days ago and reports that Janes Bravo was told to watch it closely and if the redness got any worse or the pain gets any worse to go to the ER  He reports he is coming today because he could not stand the pain any longer  No hx of shaving, trauma, or known insult to the scrotum  No hx of cellulitis previously  Review of Systems:    Review of Systems   Constitutional: Negative for appetite change, chills and fever  Respiratory: Negative for shortness of breath  Cardiovascular: Negative for chest pain  Gastrointestinal: Positive for constipation  Negative for abdominal distention, abdominal pain, nausea and vomiting  Genitourinary: Positive for difficulty urinating and scrotal swelling  Negative for discharge, penile pain and penile swelling  All other systems reviewed and are negative  Past Medical and Surgical History:     Past Medical History:   Diagnosis Date    Diabetes mellitus ([facility] [address] )     Hypertension     Neuropathy        History reviewed  No pertinent surgical history  Meds/Allergies:    Prior to Admission medications    Medication Sig Start Date End Date Taking?  Authorizing Provider   amitriptyline (ELAVIL) 75 mg tablet Take 75 mg by mouth 8/10/11  Yes Historical Provider, MD   aspirin 81 MG tablet one tab po daily 7/1/11  Yes Historical Provider, MD   atorvastatin (LIPITOR) 40 mg tablet Take 40 mg by mouth   Yes Historical Provider, MD   fluticasone-vilanterol (BREO ELLIPTA) 200-25 MCG/INH inhaler  11/1/16  Yes Historical Provider, MD   gabapentin (NEURONTIN) 100 mg capsule Take 900 mg by mouth 3 (three) times a day     Yes Historical Provider, MD   insulin glargine (LANTUS) 100 units/mL subcutaneous injection Inject 20 Units under the skin daily at bedtime   1/10/16  Yes Historical Provider, MD   insulin NPH-insulin regular (NovoLIN 70/30) 100 units/mL subcutaneous injection Inject 40 Units under the skin every morning 4/25/12  Yes Historical Provider, MD   insulin NPH-insulin regular (NovoLIN 70/30) 100 units/mL subcutaneous injection Inject 30 Units under the skin every evening   Yes Historical Provider, MD   lisinopril (ZESTRIL) 20 mg tablet  10/18/16  Yes Historical Provider, MD   metFORMIN (GLUCOPHAGE) 1000 MG tablet Take 1,000 mg by mouth 2 (two) times a day with meals   Yes Historical Provider, MD   gabapentin (NEURONTIN) 300 mg capsule Take 900 mg by mouth every 8 (eight) hours    Historical Provider, MD   insulin regular (HumuLIN R,NovoLIN R) 100 units/mL injection Inject under the skin once    Historical Provider, MD   LISINOPRIL PO Take by mouth daily    Historical Provider, MD     I have reviewed home medications with patient personally  Allergies: No Known Allergies    Social History:     Marital Status: Legally    Occupation:   Patient Pre-hospital Living Situation:   Patient Pre-hospital Level of Mobility:   Patient Pre-hospital Diet Restrictions:   Substance Use History:   History   Alcohol Use No     History   Smoking Status    Current Every Day Smoker    Packs/day: 1 00   Smokeless Tobacco    Never Used     History   Drug Use No       Family History:    History reviewed  No pertinent family history      Physical Exam:     Vitals:   Blood Pressure: (!) 200/93 (06/01/18 1957)  Pulse: 102 (06/01/18 1957)  Temperature: 99 7 °F (37 6 °C) (06/01/18 1628)  Temp Source: Temporal (06/01/18 1628)  Respirations: 18 (06/01/18 1957)  Weight - Scale: 118 kg (260 lb) (06/01/18 1628)  SpO2: 92 % (06/01/18 36)    Physical Exam      Additional Data:     Lab Results: I have personally reviewed pertinent reports  Results from last 7 days  Lab Units 06/01/18  1846   WBC Thousand/uL 7 85   HEMOGLOBIN g/dL 8 7*   HEMATOCRIT % 29 0*   PLATELETS Thousands/uL 225   NEUTROS PCT % 76*   LYMPHS PCT % 15   MONOS PCT % 8   EOS PCT % 1       Results from last 7 days  Lab Units 06/01/18  1846   SODIUM mmol/L 142   POTASSIUM mmol/L 4 9   CHLORIDE mmol/L 107   CO2 mmol/L 26   BUN mg/dL 25   CREATININE mg/dL 1 95*   CALCIUM mg/dL 8 4   TOTAL PROTEIN g/dL 6 8   BILIRUBIN TOTAL mg/dL 0 19*   ALK PHOS U/L 151*   ALT U/L 16   AST U/L 15   GLUCOSE RANDOM mg/dL 166*           Imaging: I have personally reviewed pertinent reports  Us Scrotum And Testicles    Result Date: 6/1/2018  Narrative: SCROTAL ULTRASOUND INDICATION:    Swelling, pain of scrotum, rule out abscess verses cellulitis  COMPARISON: None TECHNIQUE:   Ultrasound the scrotal contents was performed with a high frequency linear transducer utilizing volumetric sweep imaging as well as standard still image techniques  Imaging performed in longitudinal and transverse orientation  Color and spectral Doppler evaluation also performed bilaterally  FINDINGS: TESTES: Testes are symmetric and normal in size  RIGHT testis = 4 7 x 3 2 cm Normal contour with homogeneous smooth echotexture  No intratesticular mass lesion or calcifications  LEFT testis = 4 7 x 3 2 cm Normal contour with homogeneous smooth echotexture  No intratesticular mass lesion or calcifications  Doppler flow within both testes is present and symmetric  EPIDIDYMIDES: Normal Size  Doppler ultrasound demonstrates normal blood flow  No epididymal lesions  HYDROCELE:  Small bilateral complex debris-containing hydroceles  VARICOCELE:  None present  SCROTUM: Severe vascular scrotal soft tissue swelling/edema suggesting cellulitis  No drainable fluid collection identified       Impression:  Severe scrotal soft tissue swelling/edema suggesting cellulitis  No drainable fluid collection identified  Small bilateral complex debris-containing hydroceles  Workstation performed: [id number]       EKG, Pathology, and Other Studies Reviewed on Admission:   · EKG:     Allscripts / Epic Records Reviewed: Yes     ** Please Note: This note has been constructed using a voice recognition system   **

## 2018-06-02 NOTE — ASSESSMENT & PLAN NOTE
Unclear etiology, however demonstrated genital u/s as noted above  Will appreciate urology recommendations given swollen and rather firm testicles

## 2018-06-02 NOTE — ASSESSMENT & PLAN NOTE
Ongoing for 1 month  Pt reports he saw his pcp 3 days ago who per the pt continued to monitor it closely and recommend coming in to ER if worsens in erythema or pain  No evidence of gas concern for gangrene or extension into medial intertriginous areas or perineum concerning for fornier's  U/S of scrotum demonstrates Severe scrotal soft tissue swelling/edema suggesting cellulitis    No drainable fluid collection identified w/small b/l complex debris filled hydrocele, w/unremarkable epididymides, testicles and doppler flow  rec'd ancef in ED will continue w/renally dosed ancef  Consult urology

## 2018-06-02 NOTE — CONSULTS
Consultation - Urology   Elder Kelton 56 y o  male MRN: [id number]  Unit/Bed#: E2 -01 Encounter: [id number] 6/2/2018           Assessment/Plan      Assessment:  Scrotal edema and cellulitis, no evidence of abscess or tissue gangrene  Urgency and frequency    Plan:  Elevation of scrotum  IV antibiotics  Pan culture and awaiting results  Observation      History of Present Illness    56year-old diabetic gentleman with increasing scrotal pain and swelling over the last few weeks  He denies any fever, or dysuria  He states that he recently started having some polyuria, urgency and frequency and occasionally has had an incontinent episode  He denies any flank pains, nausea, vomiting, or inability to empty his bladder  Attending: Nidhi Weiss MD  Reason for Consult / Principal Problem: Patient Active Problem List   Diagnosis    Cellulitis of scrotum    Hydrocele    DILCIA [facility]acute kidney injury) ([facility] )    Constipation    Diabetes ([facility] )    Hypertension    Current smoker    At risk for noncompliance     Inpatient consult to Urology  Consult performed by: Claudette Lair  Consult ordered by: Magi Reyes        Review of Systems   Constitutional: Negative for fever  HENT: Negative  Eyes: Positive for discharge  Respiratory: Negative  Cardiovascular: Negative  Gastrointestinal: Positive for constipation  Endocrine: Negative  Genitourinary: Positive for frequency, scrotal swelling and urgency  Negative for difficulty urinating and dysuria  Musculoskeletal: Negative  Skin: Positive for rash  Neurological: Negative  Hematological: Negative  Psychiatric/Behavioral: Negative  Historical Information   No Known Allergies  Past Medical History:   Diagnosis Date    Diabetes mellitus ([facility] )     Hyperlipidemia     Hypertension     Neuropathy      History reviewed  No pertinent surgical history    Social History   History   Alcohol Use No     History   Drug Use No History   Smoking Status    Current Every Day Smoker    Packs/day: 1 00   Smokeless Tobacco    Never Used     Family History: History reviewed  No pertinent family history      Meds/Allergies   all current active meds have been reviewed    Current Facility-Administered Medications:     amitriptyline (ELAVIL) tablet 75 mg, 75 mg, Oral, HS, Jonna Raya PA-C, 75 mg at 06/02/18 0048    aspirin (ECOTRIN LOW STRENGTH) EC tablet 81 mg, 81 mg, Oral, Daily, Jonna Raya PA-C, 81 mg at 06/02/18 7092    atorvastatin (LIPITOR) tablet 40 mg, 40 mg, Oral, Daily With Dinner, Sourav Tierney PA-C    ceFAZolin (ANCEF) IVPB (premix) 1,000 mg, 1,000 mg, Intravenous, Q8H, Jonna Raya PA-C, Stopped at 06/02/18 0351    fluticasone-salmeterol (ADVAIR) 250-50 mcg/dose inhaler 1 puff, 1 puff, Inhalation, Q12H [address], Jonna Raya PA-C, 1 puff at 06/02/18 0852    gabapentin (NEURONTIN) capsule 900 mg, 900 mg, Oral, TID, Jonna Raya PA-C, 900 mg at 06/02/18 0852    heparin (porcine) subcutaneous injection 5,000 Units, 5,000 Units, Subcutaneous, Q8H [address], 5,000 Units at 06/02/18 0506 **AND** [CANCELED] Platelet count, , , Once, Jonna Raya PA-C    insulin glargine (LANTUS) subcutaneous injection 21 Units 0 21 mL, 21 Units, Subcutaneous, QAM, Jonna Raya PA-C, 21 Units at 06/02/18 0852    insulin glargine (LANTUS) subcutaneous injection 28 Units 0 28 mL, 28 Units, Subcutaneous, HS, Jonna Raya PA-C, 28 Units at 06/02/18 0051    insulin lispro (HumaLOG) 100 units/mL subcutaneous injection 2-12 Units, 2-12 Units, Subcutaneous, 4x Daily (AC & HS), 4 Units at 06/02/18 0051 **AND** Fingerstick Glucose (POCT), , , 4x Daily AC and at bedtime, Jonna Raya PA-C    insulin lispro (HumaLOG) 100 units/mL subcutaneous injection 7 Units, 7 Units, Subcutaneous, TID With Meals, Jonna Raya PA-C, 7 Units at 06/02/18 0853    nicotine (NICODERM CQ) 14 mg/24hr TD 24 hr patch 1 patch, 1 patch, Transdermal, Daily, Jonna Raya PA-C    ondansetron (ZOFRAN) injection 4 mg, 4 mg, Intravenous, Q6H PRN, Jonna Raya PA-C    sodium chloride 0 9 % infusion, 50 mL/hr, Intravenous, Continuous, Jonna Raya PA-C, Last Rate: 50 mL/hr at 06/02/18 0051, 50 mL/hr at 06/02/18 0051    Current Facility-Administered Medications:  amitriptyline 75 mg Oral HS Jonna Raya PA-C    aspirin 81 mg Oral Daily Jnona Raya PA-C    atorvastatin 40 mg Oral Daily With Lynette Don CHRIS Raya    cefazolin 1,000 mg Intravenous Q8H Jonna Raya PA-C Last Rate: Stopped (06/02/18 0351)   fluticasone-salmeterol 1 puff Inhalation Q12H [address] Jonna Raya PA-C    gabapentin 900 mg Oral TID Jonna Raya PA-C    heparin (porcine) 5,000 Units Subcutaneous Q8H [address] Jonna aRya PA-C    insulin glargine 21 Units Subcutaneous QAM Jonna Raya PA-C    insulin glargine 28 Units Subcutaneous HS Jonna Raya PA-C    insulin lispro 2-12 Units Subcutaneous 4x Daily (AC & HS) Jonna Raya PA-C    insulin lispro 7 Units Subcutaneous TID With Meals Jonna Raya PA-C    nicotine 1 patch Transdermal Daily Jonna Raya PA-C    ondansetron 4 mg Intravenous Q6H PRN Jonna Raya PA-C    sodium chloride 50 mL/hr Intravenous Continuous Jonna Raya PA-C Last Rate: 50 mL/hr (06/02/18 0051)     ondansetron    sodium chloride 50 mL/hr Last Rate: 50 mL/hr (06/02/18 0051)       Objective   Vitals: Blood pressure (!) 177/91, pulse 94, temperature 98 °F (36 7 °C), temperature source Tympanic, resp  rate 20, height 5' 10" (1 778 m), weight 117 kg (257 lb 15 oz), SpO2 92 %  I/O last 24 hours:   In: 308 3 [I V :258 3; IV Piggyback:50]  Out: 700 [Urine:700]    PVR this morning was 160 cc    Invasive Devices     Peripheral Intravenous Line            Peripheral IV 06/01/18 Right Antecubital less than 1 day                Physical Exam   Constitutional: He is oriented to person, place, and time  He appears well-developed and well-nourished  No distress  Obese with sizable pannus   HENT:   Head: Normocephalic and atraumatic  Eyes: Conjunctivae are normal    Neck: Neck supple  Pulmonary/Chest: Effort normal and breath sounds normal    Abdominal: Soft  Bowel sounds are normal  He exhibits no distension and no mass  There is no tenderness  Hernia confirmed negative in the right inguinal area and confirmed negative in the left inguinal area  Genitourinary: Penis normal  Right testis shows swelling and tenderness  Right testis shows no mass  Left testis shows swelling and tenderness  Left testis shows no mass  Circumcised  No penile erythema or penile tenderness  Musculoskeletal: Normal range of motion  Lymphadenopathy:        Right: No inguinal adenopathy present  Left: No inguinal adenopathy present  Neurological: He is alert and oriented to person, place, and time  No cranial nerve deficit  Skin: Skin is warm and dry  There is erythema  Psychiatric: He has a normal mood and affect  His behavior is normal  Judgment and thought content normal        Lab Results:   I have personally reviewed pertinent reports      CBC:   Lab Results   Component Value Date    WBC 7 85 06/01/2018    HGB 8 7 (L) 06/01/2018    HCT 29 0 (L) 06/01/2018    MCV 95 06/01/2018     06/01/2018    MCH 28 6 06/01/2018    MCHC 30 0 (L) 06/01/2018    RDW 15 6 (H) 06/01/2018    MPV 10 0 06/01/2018    NRBC 0 06/01/2018     CMP:   Lab Results   Component Value Date     06/01/2018     06/01/2018    CO2 26 06/01/2018    ANIONGAP 9 06/01/2018    BUN 25 06/01/2018    CREATININE 1 95 (H) 06/01/2018    GLUCOSE 166 (H) 06/01/2018    CALCIUM 8 4 06/01/2018    AST 15 06/01/2018    ALT 16 06/01/2018    ALKPHOS 151 (H) 06/01/2018    PROT 6 8 06/01/2018    BILITOT 0 19 (L) 06/01/2018    EGFR 38 06/01/2018     Urinalysis:   Lab Results   Component Value Date    COLORU Yellow 06/01/2018    CLARITYU Clear 06/01/2018    SPECGRAV 1 015 06/01/2018    PHUR 6 5 06/01/2018    LEUKOCYTESUR Negative 06/01/2018    NITRITE Negative 06/01/2018    PROTEINUA >=300 (A) 06/01/2018    GLUCOSEU 100 (1/10%) (A) 06/01/2018    KETONESU Negative 06/01/2018    BILIRUBINUR Negative 06/01/2018    BLOODU Moderate (A) 06/01/2018       Imaging Studies: I have personally reviewed pertinent reports  and I have personally reviewed pertinent films in PACS  Us Scrotum And Testicles    Result Date: 6/1/2018  Narrative: SCROTAL ULTRASOUND INDICATION:    Swelling, pain of scrotum, rule out abscess verses cellulitis  COMPARISON: None TECHNIQUE:   Ultrasound the scrotal contents was performed with a high frequency linear transducer utilizing volumetric sweep imaging as well as standard still image techniques  Imaging performed in longitudinal and transverse orientation  Color and spectral Doppler evaluation also performed bilaterally  FINDINGS: TESTES: Testes are symmetric and normal in size  RIGHT testis = 4 7 x 3 2 cm Normal contour with homogeneous smooth echotexture  No intratesticular mass lesion or calcifications  LEFT testis = 4 7 x 3 2 cm Normal contour with homogeneous smooth echotexture  No intratesticular mass lesion or calcifications  Doppler flow within both testes is present and symmetric  EPIDIDYMIDES: Normal Size  Doppler ultrasound demonstrates normal blood flow  No epididymal lesions  HYDROCELE:  Small bilateral complex debris-containing hydroceles  VARICOCELE:  None present  SCROTUM: Severe vascular scrotal soft tissue swelling/edema suggesting cellulitis  No drainable fluid collection identified  Impression:  Severe scrotal soft tissue swelling/edema suggesting cellulitis  No drainable fluid collection identified  Small bilateral complex debris-containing hydroceles  Workstation performed: [id number]       EKG, Pathology, and Other Studies: I have personally reviewed pertinent reports     and I have personally reviewed pertinent films in PACS    VTE Prophylaxis: Sequential compression device (Venodyne)  and Heparin      Counseling / Coordination of Care  Total floor / unit time spent today 40 minutes  Greater than 50% of total time was spent with the patient and / or family counseling and / or coordination of care   A description of the counseling / coordination of care:      Tee Fontanez MD

## 2018-06-02 NOTE — ASSESSMENT & PLAN NOTE
In the setting of poorly controlled DM and w/hypoalbuminemia and  >300 proteinuria  Given substantial scrotal edema and decreased uop check stat pvr/bladder scan to r/o bladder outlet obstruction  Will check urine protein creatinine ratio, start gentle IVF hydration      Repeat bmp in am

## 2018-06-02 NOTE — ASSESSMENT & PLAN NOTE
Hx of homelessness, pt appears to be now staying w/friend  Will consult CM to assess for any risks for noncompliance for dc planning

## 2018-06-02 NOTE — PLAN OF CARE
GENITOURINARY - ADULT     Maintains or returns to baseline urinary function Progressing     Absence of urinary retention Progressing     Urinary catheter remains patent Progressing        INFECTION - ADULT     Absence or prevention of progression during hospitalization Progressing     Absence of fever/infection during neutropenic period Progressing        PAIN - ADULT     Verbalizes/displays adequate comfort level or baseline comfort level Progressing        SAFETY ADULT     Patient will remain free of falls Progressing     Maintain or return to baseline ADL function Progressing     Maintain or return mobility status to optimal level Progressing

## 2018-06-02 NOTE — PROGRESS NOTES
Progress Note - Zoila Hoover 56 y o  male MRN: [id number]    Unit/Bed#: E2 -01 Encounter: [id number]    Assessment/Plan:    Scrotal cellulitis  reviewed Urology consult evidence of abscess or tissue gangrene continue Ancef, elevate scrotum and monitor culture    Tobacco abuse  cessation counseling provided refuses patch    Diabetes   continue Lantus with sliding scale and ADA diet    A KI    creatinine 1 14 year ago, now 1 95, continue IV fluids and monitor possible pre renal    Dyslipidemia   continue statin for LDL control    Hypertension   start Coreg for control, holding lisinopril with increased creatinine      Diabetic neuropathy  continue Neurontin for pain control     COPD    continue Advair and monitor pulse ox, again recommend discontinuing smoking    Anemia   request iron levels and Hemoccult stools     Subjective:   Scrotal pain persists, lower extremity edema, denies chest pain shortness of breath nausea vomiting diarrhea no fevers chills appetite is okay      Objective:     Vitals: Blood pressure (!) 177/91, pulse 94, temperature 98 °F (36 7 °C), temperature source Tympanic, resp  rate 20, height 5' 10" (1 778 m), weight 117 kg (257 lb 15 oz), SpO2 92 %  ,Body mass index is 37 01 kg/m²          Results from last 7 days  Lab Units 06/01/18  1846   WBC Thousand/uL 7 85   HEMOGLOBIN g/dL 8 7*   HEMATOCRIT % 29 0*   PLATELETS Thousands/uL 225       Results from last 7 days  Lab Units 06/01/18  1846   SODIUM mmol/L 142   POTASSIUM mmol/L 4 9   CHLORIDE mmol/L 107   CO2 mmol/L 26   BUN mg/dL 25   CREATININE mg/dL 1 95*   CALCIUM mg/dL 8 4   TOTAL PROTEIN g/dL 6 8   BILIRUBIN TOTAL mg/dL 0 19*   ALK PHOS U/L 151*   ALT U/L 16   AST U/L 15   GLUCOSE RANDOM mg/dL 166*       Scheduled Meds:    Current Facility-Administered Medications:  amitriptyline 75 mg Oral HS Jonna Raya PA-C    aspirin 81 mg Oral Daily Jonna Raya PA-C    atorvastatin 40 mg Oral Daily With CHRIS Miller cefazolin 1,000 mg Intravenous Q8H Jonna Raya PA-C Last Rate: 1,000 mg (06/02/18 1203)   fluticasone-salmeterol 1 puff Inhalation Q12H [facility] Jonna Raya PA-C    gabapentin 900 mg Oral TID Jonna Raya PA-C    heparin (porcine) 5,000 Units Subcutaneous Q8H [facility] Jonna Raya PA-C    insulin glargine 21 Units Subcutaneous QAM Jonna Raya PA-C    insulin glargine 28 Units Subcutaneous HS Jonna Raya PA-C    insulin lispro 2-12 Units Subcutaneous 4x Daily (AC & HS) Jonna Raya PA-C    insulin lispro 7 Units Subcutaneous TID With Meals Jonna Raya PA-C    nicotine 1 patch Transdermal Daily Jonna Raya PA-C    ondansetron 4 mg Intravenous Q6H PRN Jonna Raya PA-C    sodium chloride 50 mL/hr Intravenous Continuous Jonna Raya PA-C Last Rate: 50 mL/hr (06/02/18 0051)       Continuous Infusions:    sodium chloride 50 mL/hr Last Rate: 50 mL/hr (06/02/18 0051)         Physical exam:  General appearance:  Alert no distress interaction appropriate obese  Head/Eyes:  Nonicteric PERRL EOMI  Neck:  Supple  Lungs:  Decreased BS bilateral no wheezing rhonchi or rales  Heart: normal S1 S2 regular  Abdomen:  Obese nontender with bowel sounds  Extremities:  1+ pitting edema scrotum erythema and swelling  Skin: no rash    Invasive Devices     Peripheral Intravenous Line            Peripheral IV 06/01/18 Right Antecubital less than 1 day                  VTE Pharmacologic Prophylaxis:  heparin   VTE Mechanical Prophylaxis:  SCDs                     Counseling / Coordination of Care  Total floor / unit time spent today  30  minutes  Greater than 50% of total time was spent with the patient and / or family counseling and / or coordination of care    A description of the counseling / coordination of care:

## 2018-06-02 NOTE — ASSESSMENT & PLAN NOTE
Over last 10 days  No abdominal pain or bloating  Pt passing gas    Given decreased urine output raises concern for bladder obstruction  Start docusate 100mg po bid and miralax prn

## 2018-06-02 NOTE — ASSESSMENT & PLAN NOTE
Insulin dependent dm  Convert op novolin 70/30 (40 q am 30 q pm ) to lantus 28 qhs and 21 q am w/humalog 7 IU TID AC  Continue SSI and POC BS, check hgb a1c

## 2018-06-03 LAB
ANION GAP SERPL CALCULATED.3IONS-SCNC: 7 MMOL/L (ref 4–13)
BASOPHILS # BLD AUTO: 0.01 THOUSANDS/ΜL (ref 0–0.1)
BASOPHILS NFR BLD AUTO: 0 % (ref 0–1)
BUN SERPL-MCNC: 29 MG/DL (ref 5–25)
CALCIUM SERPL-MCNC: 8 MG/DL (ref 8.3–10.1)
CHLORIDE SERPL-SCNC: 112 MMOL/L (ref 100–108)
CO2 SERPL-SCNC: 26 MMOL/L (ref 21–32)
CREAT SERPL-MCNC: 1.69 MG/DL (ref 0.6–1.3)
EOSINOPHIL # BLD AUTO: 0.11 THOUSAND/ΜL (ref 0–0.61)
EOSINOPHIL NFR BLD AUTO: 2 % (ref 0–6)
ERYTHROCYTE [DISTWIDTH] IN BLOOD BY AUTOMATED COUNT: 15.8 % (ref 11.6–15.1)
GFR SERPL CREATININE-BSD FRML MDRD: 45 ML/MIN/1.73SQ M
GLUCOSE SERPL-MCNC: 101 MG/DL (ref 65–140)
GLUCOSE SERPL-MCNC: 110 MG/DL (ref 65–140)
GLUCOSE SERPL-MCNC: 125 MG/DL (ref 65–140)
GLUCOSE SERPL-MCNC: 161 MG/DL (ref 65–140)
GLUCOSE SERPL-MCNC: 40 MG/DL (ref 65–140)
GLUCOSE SERPL-MCNC: 49 MG/DL (ref 65–140)
GLUCOSE SERPL-MCNC: 60 MG/DL (ref 65–140)
GLUCOSE SERPL-MCNC: 61 MG/DL (ref 65–140)
GLUCOSE SERPL-MCNC: 80 MG/DL (ref 65–140)
HCT VFR BLD AUTO: 26.7 % (ref 36.5–49.3)
HGB BLD-MCNC: 7.7 G/DL (ref 12–17)
IRON SATN MFR SERPL: 10 %
IRON SERPL-MCNC: 24 UG/DL (ref 65–175)
LYMPHOCYTES # BLD AUTO: 1.15 THOUSANDS/ΜL (ref 0.6–4.47)
LYMPHOCYTES NFR BLD AUTO: 22 % (ref 14–44)
MCH RBC QN AUTO: 28 PG (ref 26.8–34.3)
MCHC RBC AUTO-ENTMCNC: 28.8 G/DL (ref 31.4–37.4)
MCV RBC AUTO: 97 FL (ref 82–98)
MONOCYTES # BLD AUTO: 0.49 THOUSAND/ΜL (ref 0.17–1.22)
MONOCYTES NFR BLD AUTO: 9 % (ref 4–12)
NEUTROPHILS # BLD AUTO: 3.47 THOUSANDS/ΜL (ref 1.85–7.62)
NEUTS SEG NFR BLD AUTO: 66 % (ref 43–75)
NRBC BLD AUTO-RTO: 0 /100 WBCS
PLATELET # BLD AUTO: 193 THOUSANDS/UL (ref 149–390)
PMV BLD AUTO: 10.1 FL (ref 8.9–12.7)
POTASSIUM SERPL-SCNC: 5.1 MMOL/L (ref 3.5–5.3)
RBC # BLD AUTO: 2.75 MILLION/UL (ref 3.88–5.62)
SODIUM SERPL-SCNC: 145 MMOL/L (ref 136–145)
TIBC SERPL-MCNC: 246 UG/DL (ref 250–450)
WBC # BLD AUTO: 5.23 THOUSAND/UL (ref 4.31–10.16)

## 2018-06-03 PROCEDURE — 82948 REAGENT STRIP/BLOOD GLUCOSE: CPT

## 2018-06-03 PROCEDURE — 83550 IRON BINDING TEST: CPT | Performed by: INTERNAL MEDICINE

## 2018-06-03 PROCEDURE — 85025 COMPLETE CBC W/AUTO DIFF WBC: CPT | Performed by: INTERNAL MEDICINE

## 2018-06-03 PROCEDURE — 80048 BASIC METABOLIC PNL TOTAL CA: CPT | Performed by: INTERNAL MEDICINE

## 2018-06-03 PROCEDURE — 99232 SBSQ HOSP IP/OBS MODERATE 35: CPT | Performed by: INTERNAL MEDICINE

## 2018-06-03 PROCEDURE — 83540 ASSAY OF IRON: CPT | Performed by: INTERNAL MEDICINE

## 2018-06-03 PROCEDURE — 99232 SBSQ HOSP IP/OBS MODERATE 35: CPT | Performed by: UROLOGY

## 2018-06-03 RX ORDER — INSULIN GLARGINE 100 [IU]/ML
20 INJECTION, SOLUTION SUBCUTANEOUS
Status: DISCONTINUED | OUTPATIENT
Start: 2018-06-03 | End: 2018-06-09

## 2018-06-03 RX ORDER — FUROSEMIDE 10 MG/ML
40 INJECTION INTRAMUSCULAR; INTRAVENOUS ONCE
Status: COMPLETED | OUTPATIENT
Start: 2018-06-03 | End: 2018-06-03

## 2018-06-03 RX ADMIN — CEFAZOLIN SODIUM 1000 MG: 1 SOLUTION INTRAVENOUS at 04:21

## 2018-06-03 RX ADMIN — HEPARIN SODIUM 5000 UNITS: 5000 INJECTION, SOLUTION INTRAVENOUS; SUBCUTANEOUS at 21:00

## 2018-06-03 RX ADMIN — CARVEDILOL 6.25 MG: 3.12 TABLET, FILM COATED ORAL at 17:52

## 2018-06-03 RX ADMIN — DOCUSATE SODIUM 100 MG: 100 CAPSULE, LIQUID FILLED ORAL at 08:44

## 2018-06-03 RX ADMIN — FLUTICASONE PROPIONATE AND SALMETEROL 1 PUFF: 50; 250 POWDER RESPIRATORY (INHALATION) at 08:44

## 2018-06-03 RX ADMIN — HEPARIN SODIUM 5000 UNITS: 5000 INJECTION, SOLUTION INTRAVENOUS; SUBCUTANEOUS at 05:00

## 2018-06-03 RX ADMIN — GABAPENTIN 900 MG: 300 CAPSULE ORAL at 21:00

## 2018-06-03 RX ADMIN — INSULIN GLARGINE 20 UNITS: 100 INJECTION, SOLUTION SUBCUTANEOUS at 21:06

## 2018-06-03 RX ADMIN — ASPIRIN 81 MG: 81 TABLET, COATED ORAL at 08:43

## 2018-06-03 RX ADMIN — GABAPENTIN 900 MG: 300 CAPSULE ORAL at 17:52

## 2018-06-03 RX ADMIN — Medication 16 G: at 08:58

## 2018-06-03 RX ADMIN — FLUTICASONE PROPIONATE AND SALMETEROL 1 PUFF: 50; 250 POWDER RESPIRATORY (INHALATION) at 21:02

## 2018-06-03 RX ADMIN — ACETAMINOPHEN 650 MG: 325 TABLET ORAL at 21:00

## 2018-06-03 RX ADMIN — FUROSEMIDE 40 MG: 10 INJECTION, SOLUTION INTRAMUSCULAR; INTRAVENOUS at 11:10

## 2018-06-03 RX ADMIN — GABAPENTIN 900 MG: 300 CAPSULE ORAL at 08:43

## 2018-06-03 RX ADMIN — DOCUSATE SODIUM 100 MG: 100 CAPSULE, LIQUID FILLED ORAL at 17:52

## 2018-06-03 RX ADMIN — INSULIN LISPRO 2 UNITS: 100 INJECTION, SOLUTION INTRAVENOUS; SUBCUTANEOUS at 17:52

## 2018-06-03 RX ADMIN — ACETAMINOPHEN 650 MG: 325 TABLET ORAL at 08:43

## 2018-06-03 RX ADMIN — SODIUM CHLORIDE 50 ML/HR: 0.9 INJECTION, SOLUTION INTRAVENOUS at 01:26

## 2018-06-03 RX ADMIN — DESMOPRESSIN ACETATE 40 MG: 0.2 TABLET ORAL at 17:52

## 2018-06-03 RX ADMIN — HEPARIN SODIUM 5000 UNITS: 5000 INJECTION, SOLUTION INTRAVENOUS; SUBCUTANEOUS at 13:56

## 2018-06-03 RX ADMIN — AMITRIPTYLINE HYDROCHLORIDE 75 MG: 50 TABLET, FILM COATED ORAL at 21:01

## 2018-06-03 RX ADMIN — POLYETHYLENE GLYCOL 3350 17 G: 17 POWDER, FOR SOLUTION ORAL at 08:42

## 2018-06-03 RX ADMIN — CARVEDILOL 6.25 MG: 3.12 TABLET, FILM COATED ORAL at 08:43

## 2018-06-03 NOTE — PROGRESS NOTES
Progress Note - Nadiya Friday 56 y o  male MRN: [id number]    Unit/Bed#: E2 -01 Encounter: [id number]    Assessment/Plan:    Scrotal edema   reviewed with Urology, unlikely associated with infection most likely swelling and edema, will initiate Lasix and elevate scrotum, no benefit with antibiotics blood culture negative    Tobacco abuse  counseling cessation provided    Diabetes   hypoglycemia today, will discontinue a m  Lantus, decreased p m  Lantus to 20 units, discontinue scheduled NovoLog, continue sliding scale and ADA diet, discussed with patient has multiple episodes of hypoglycemia at home with sugars below 20, therefore unlikely he needs twice daily Lantus    Heart failure   echo is pending, will initiate Lasix continue Coreg, no ACE-inhibitor with increased creatinine    Acute kidney injury  slight improvement with fluids, maybe related to decrease perfusion with heart failure, will DC IV fluids and initiated Lasix and closely monitor creatinine, appears baseline creatinine 1 1, creatinine today 1 69    Anemia   await iron studies, occult blood checks pending    COPD    stable on room air continue Advair again recommending discontinuing smoking    Subjective:   Feels sweaty today, scrotal pain, denies chest pain shortness of breath nausea vomiting diarrhea no fevers chills appetite is very good      Objective:     Vitals: Blood pressure 141/76, pulse 93, temperature (!) 96 7 °F (35 9 °C), temperature source Tympanic, resp  rate 16, height 5' 10" (1 778 m), weight 117 kg (257 lb 15 oz), SpO2 94 %  ,Body mass index is 37 01 kg/m²          Results from last 7 days  Lab Units 06/03/18  0502   WBC Thousand/uL 5 23   HEMOGLOBIN g/dL 7 7*   HEMATOCRIT % 26 7*   PLATELETS Thousands/uL 193       Results from last 7 days  Lab Units 06/03/18  0502 06/01/18  1846   SODIUM mmol/L 145 142   POTASSIUM mmol/L 5 1 4 9   CHLORIDE mmol/L 112* 107   CO2 mmol/L 26 26   BUN mg/dL 29* 25   CREATININE mg/dL 1 69* 1 95* CALCIUM mg/dL 8 0* 8 4   TOTAL PROTEIN g/dL  --  6 8   BILIRUBIN TOTAL mg/dL  --  0 19*   ALK PHOS U/L  --  151*   ALT U/L  --  16   AST U/L  --  15   GLUCOSE RANDOM mg/dL 61* 166*       Scheduled Meds:    Current Facility-Administered Medications:  acetaminophen 650 mg Oral Q6H PRN Jonna Raya PA-C   amitriptyline 75 mg Oral HS Jonna Raya PA-C   aspirin 81 mg Oral Daily Jonna Raya PA-C   atorvastatin 40 mg Oral Daily With Lynette Brothers CHRIS Raya   carvedilol 6 25 mg Oral BID With Meals [facility], DO   docusate sodium 100 mg Oral BID Jonna Raya PA-C   fluticasone-salmeterol 1 puff Inhalation Q12H [address] Jonna Raya PA-C   furosemide 40 mg Intravenous Once [facility],    gabapentin 900 mg Oral TID Jonna Raya PA-C   heparin (porcine) 5,000 Units Subcutaneous Q8H [address] Jonna Raya PA-C   insulin glargine 20 Units Subcutaneous HS [facility],    insulin lispro 2-12 Units Subcutaneous 4x Daily (AC & HS) Jonna Raya PA-C   nicotine 1 patch Transdermal Daily Jonna Raya PA-C   ondansetron 4 mg Intravenous Q6H PRN Jonna Raya PA-C   oxyCODONE 5 mg Oral Q4H PRN Jonna Raya PA-C   polyethylene glycol 17 g Oral Daily Jonna Raya PA-C       Continuous Infusions:     Physical exam:  General appearance:  Alert no distress interaction appropriate oriented x3   Head/Eyes:  Nonicteric diaphoretic PERRL EOMII  Neck:  Supple  Lungs:  Decreased BS bilateral no wheezing rhonchi or rales  Heart: normal S1 S2 regular  Abdomen:  Obese nontender with bowel sounds  Extremities:  2+ pitting edema scrotal swelling and erythema  Skin: no rash    Invasive Devices     Peripheral Intravenous Line            Peripheral IV 06/02/18 Left Hand less than 1 day                  VTE Pharmacologic Prophylaxis:  heparin   VTE Mechanical Prophylaxis: heparin                     Counseling / Coordination of Care  Total floor / unit time spent today  30 minutes  Greater than 50% of total time was spent with the patient and / or family counseling and / or coordination of care    A description of the counseling / coordination of care:  Discussed with Urology

## 2018-06-03 NOTE — PROGRESS NOTES
Patient seen and examined  He denies any changes to his scrotal pain  On examination he has very significant scrotal edema as well as very significant lower extremity edema  In my opinion he does not have any significant infection  I think this is all just edema  I recommend continued scrotal elevation and any sort of diuresis if possible    Please contact us if there are any changes in his exam

## 2018-06-03 NOTE — PROGRESS NOTES
Patient resting in bed, no complaints of pain at this time  patient refusing to elevate scrotum saying "I did it all day, I will do it later " Patients call bell within reach, all evening medication has been given per order   Will CTM

## 2018-06-04 ENCOUNTER — APPOINTMENT (INPATIENT)
Dept: ULTRASOUND IMAGING | Facility: HOSPITAL | Age: 58
DRG: 501 | End: 2018-06-04
Payer: COMMERCIAL

## 2018-06-04 ENCOUNTER — APPOINTMENT (INPATIENT)
Dept: NON INVASIVE DIAGNOSTICS | Facility: HOSPITAL | Age: 58
DRG: 501 | End: 2018-06-04
Payer: COMMERCIAL

## 2018-06-04 LAB
ANION GAP SERPL CALCULATED.3IONS-SCNC: 6 MMOL/L (ref 4–13)
BASOPHILS # BLD AUTO: 0.02 THOUSANDS/ΜL (ref 0–0.1)
BASOPHILS NFR BLD AUTO: 0 % (ref 0–1)
BUN SERPL-MCNC: 32 MG/DL (ref 5–25)
CALCIUM SERPL-MCNC: 8.2 MG/DL (ref 8.3–10.1)
CHLORIDE SERPL-SCNC: 109 MMOL/L (ref 100–108)
CO2 SERPL-SCNC: 28 MMOL/L (ref 21–32)
CREAT SERPL-MCNC: 1.81 MG/DL (ref 0.6–1.3)
EOSINOPHIL # BLD AUTO: 0.14 THOUSAND/ΜL (ref 0–0.61)
EOSINOPHIL NFR BLD AUTO: 3 % (ref 0–6)
ERYTHROCYTE [DISTWIDTH] IN BLOOD BY AUTOMATED COUNT: 15.7 % (ref 11.6–15.1)
GFR SERPL CREATININE-BSD FRML MDRD: 41 ML/MIN/1.73SQ M
GLUCOSE SERPL-MCNC: 103 MG/DL (ref 65–140)
GLUCOSE SERPL-MCNC: 115 MG/DL (ref 65–140)
GLUCOSE SERPL-MCNC: 143 MG/DL (ref 65–140)
GLUCOSE SERPL-MCNC: 187 MG/DL (ref 65–140)
GLUCOSE SERPL-MCNC: 92 MG/DL (ref 65–140)
HCT VFR BLD AUTO: 27.7 % (ref 36.5–49.3)
HGB BLD-MCNC: 8 G/DL (ref 12–17)
LYMPHOCYTES # BLD AUTO: 1.32 THOUSANDS/ΜL (ref 0.6–4.47)
LYMPHOCYTES NFR BLD AUTO: 30 % (ref 14–44)
MCH RBC QN AUTO: 28 PG (ref 26.8–34.3)
MCHC RBC AUTO-ENTMCNC: 28.9 G/DL (ref 31.4–37.4)
MCV RBC AUTO: 97 FL (ref 82–98)
MONOCYTES # BLD AUTO: 0.51 THOUSAND/ΜL (ref 0.17–1.22)
MONOCYTES NFR BLD AUTO: 11 % (ref 4–12)
NEUTROPHILS # BLD AUTO: 2.47 THOUSANDS/ΜL (ref 1.85–7.62)
NEUTS SEG NFR BLD AUTO: 56 % (ref 43–75)
NRBC BLD AUTO-RTO: 0 /100 WBCS
PLATELET # BLD AUTO: 189 THOUSANDS/UL (ref 149–390)
PMV BLD AUTO: 10.2 FL (ref 8.9–12.7)
POTASSIUM SERPL-SCNC: 5 MMOL/L (ref 3.5–5.3)
RBC # BLD AUTO: 2.86 MILLION/UL (ref 3.88–5.62)
SODIUM SERPL-SCNC: 143 MMOL/L (ref 136–145)
WBC # BLD AUTO: 4.46 THOUSAND/UL (ref 4.31–10.16)

## 2018-06-04 PROCEDURE — 80048 BASIC METABOLIC PNL TOTAL CA: CPT | Performed by: INTERNAL MEDICINE

## 2018-06-04 PROCEDURE — 82948 REAGENT STRIP/BLOOD GLUCOSE: CPT

## 2018-06-04 PROCEDURE — 99254 IP/OBS CNSLTJ NEW/EST MOD 60: CPT | Performed by: INTERNAL MEDICINE

## 2018-06-04 PROCEDURE — 85025 COMPLETE CBC W/AUTO DIFF WBC: CPT | Performed by: INTERNAL MEDICINE

## 2018-06-04 PROCEDURE — 93306 TTE W/DOPPLER COMPLETE: CPT

## 2018-06-04 PROCEDURE — 93306 TTE W/DOPPLER COMPLETE: CPT | Performed by: INTERNAL MEDICINE

## 2018-06-04 PROCEDURE — 99233 SBSQ HOSP IP/OBS HIGH 50: CPT | Performed by: INTERNAL MEDICINE

## 2018-06-04 RX ORDER — FERROUS SULFATE 325(65) MG
325 TABLET ORAL 2 TIMES DAILY WITH MEALS
Status: DISCONTINUED | OUTPATIENT
Start: 2018-06-04 | End: 2018-06-16 | Stop reason: HOSPADM

## 2018-06-04 RX ADMIN — FLUTICASONE PROPIONATE AND SALMETEROL 1 PUFF: 50; 250 POWDER RESPIRATORY (INHALATION) at 21:32

## 2018-06-04 RX ADMIN — GABAPENTIN 900 MG: 300 CAPSULE ORAL at 16:42

## 2018-06-04 RX ADMIN — FERROUS SULFATE TAB 325 MG (65 MG ELEMENTAL FE) 325 MG: 325 (65 FE) TAB at 16:42

## 2018-06-04 RX ADMIN — GABAPENTIN 900 MG: 300 CAPSULE ORAL at 09:08

## 2018-06-04 RX ADMIN — ACETAMINOPHEN 650 MG: 325 TABLET ORAL at 19:36

## 2018-06-04 RX ADMIN — ASPIRIN 81 MG: 81 TABLET, COATED ORAL at 09:08

## 2018-06-04 RX ADMIN — CEFAZOLIN SODIUM 1000 MG: 1 SOLUTION INTRAVENOUS at 21:30

## 2018-06-04 RX ADMIN — POLYETHYLENE GLYCOL 3350 17 G: 17 POWDER, FOR SOLUTION ORAL at 09:08

## 2018-06-04 RX ADMIN — OXYCODONE HYDROCHLORIDE 5 MG: 5 TABLET ORAL at 22:55

## 2018-06-04 RX ADMIN — INSULIN GLARGINE 20 UNITS: 100 INJECTION, SOLUTION SUBCUTANEOUS at 21:36

## 2018-06-04 RX ADMIN — INSULIN LISPRO 2 UNITS: 100 INJECTION, SOLUTION INTRAVENOUS; SUBCUTANEOUS at 16:45

## 2018-06-04 RX ADMIN — CARVEDILOL 6.25 MG: 3.12 TABLET, FILM COATED ORAL at 16:42

## 2018-06-04 RX ADMIN — AMITRIPTYLINE HYDROCHLORIDE 75 MG: 50 TABLET, FILM COATED ORAL at 21:33

## 2018-06-04 RX ADMIN — HEPARIN SODIUM 5000 UNITS: 5000 INJECTION, SOLUTION INTRAVENOUS; SUBCUTANEOUS at 15:00

## 2018-06-04 RX ADMIN — ACETAMINOPHEN 650 MG: 325 TABLET ORAL at 05:28

## 2018-06-04 RX ADMIN — CARVEDILOL 6.25 MG: 3.12 TABLET, FILM COATED ORAL at 09:08

## 2018-06-04 RX ADMIN — DESMOPRESSIN ACETATE 40 MG: 0.2 TABLET ORAL at 16:42

## 2018-06-04 RX ADMIN — HEPARIN SODIUM 5000 UNITS: 5000 INJECTION, SOLUTION INTRAVENOUS; SUBCUTANEOUS at 05:26

## 2018-06-04 RX ADMIN — OXYCODONE HYDROCHLORIDE 5 MG: 5 TABLET ORAL at 18:37

## 2018-06-04 RX ADMIN — HEPARIN SODIUM 5000 UNITS: 5000 INJECTION, SOLUTION INTRAVENOUS; SUBCUTANEOUS at 21:36

## 2018-06-04 RX ADMIN — DOCUSATE SODIUM 100 MG: 100 CAPSULE, LIQUID FILLED ORAL at 09:09

## 2018-06-04 RX ADMIN — CEFAZOLIN SODIUM 1000 MG: 1 SOLUTION INTRAVENOUS at 12:23

## 2018-06-04 RX ADMIN — GABAPENTIN 900 MG: 300 CAPSULE ORAL at 21:32

## 2018-06-04 RX ADMIN — NICOTINE 1 PATCH: 14 PATCH TRANSDERMAL at 09:08

## 2018-06-04 RX ADMIN — FLUTICASONE PROPIONATE AND SALMETEROL 1 PUFF: 50; 250 POWDER RESPIRATORY (INHALATION) at 09:07

## 2018-06-04 RX ADMIN — DOCUSATE SODIUM 100 MG: 100 CAPSULE, LIQUID FILLED ORAL at 18:38

## 2018-06-04 NOTE — PLAN OF CARE
Problem: DISCHARGE PLANNING - CARE MANAGEMENT  Goal: Discharge to post-acute care or home with appropriate resources  INTERVENTIONS:  - Conduct assessment to determine patient/family and health care team treatment goals, and need for post-acute services based on payer coverage, community resources, and patient preferences, and barriers to discharge  - Address psychosocial, clinical, and financial barriers to discharge as identified in assessment in conjunction with the patient/family and health care team  - Arrange appropriate level of post-acute services according to patients   needs and preference and payer coverage in collaboration with the physician and health care team  - Communicate with and update the patient/family, physician, and health care team regarding progress on the discharge plan  - Arrange appropriate transportation to post-acute venues  Outcome: Completed Date Met: 06/04/18  Patient to be discharged with appropriate services when medically cleared by MD  No additional needs at present  CM to follow as needed

## 2018-06-04 NOTE — PROGRESS NOTES
[facility] Progress Note  Patient: Cinthya Love 56 y o  male   MRN: [id number]  PCP: Marilynn Chase DO  Unit/Bed#: E2 -01 Encounter: [id number]  Date Of Visit: 18    Assessment/Plan:  1  Scrotal edema- ?cellulitis, hypoalbumin  2  Diabetic Nephropathy   3  Proteinuria   4  MARK   5  COPD     -Unclear if scrotal swelling due to cellulitis and/or low albumin  F/u echo  Urology and Nephro have evaluated the patient  Agree slight rise in Cr is from lasix  Likely underlying CKD from diabetes  Has proteinuria  Nephro recs are to hold of diuretics for now  Check renal US  Unclear if true infection, no white count or fever and BCX NTD, however will cover for now  Iron replacement  Occult was ordered  Subjective:   States still swollen  Scrotum is red but not to painful  Denies SOB  Afebrile overnight  Objective:     Vitals:   Temp (24hrs), Av 9 °F (36 1 °C), Min:96 3 °F (35 7 °C), Max:97 2 °F (36 2 °C)    HR:  [79-84] 80  Resp:  [18-20] 20  BP: (134-167)/(73-84) 134/75  SpO2:  [96 %] 96 %  Body mass index is 37 01 kg/m²  Input and Output Summary (last 24 hours):        Intake/Output Summary (Last 24 hours) at 18 1152  Last data filed at 18 0900   Gross per 24 hour   Intake              240 ml   Output             1550 ml   Net            -1310 ml       Physical Exam:     Physical Exam    GEN: NAD, obese  HEENT: PERRL  CARDIO: s1 s2 RRR  LUNGS: CTA  ABD: Soft, NT/ND  : scrotal edema and erythema   EXT: LE edema       Additional Data:     Labs:      Results from last 7 days  Lab Units 18  0511   WBC Thousand/uL 4 46   HEMOGLOBIN g/dL 8 0*   HEMATOCRIT % 27 7*   PLATELETS Thousands/uL 189   NEUTROS PCT % 56   LYMPHS PCT % 30   MONOS PCT % 11   EOS PCT % 3       Results from last 7 days  Lab Units 18  0511  18  1846   SODIUM mmol/L 143  < > 142   POTASSIUM mmol/L 5 0  < > 4 9   CHLORIDE mmol/L 109*  < > 107   CO2 mmol/L 28  < > 26   BUN mg/dL 32*  < > 25   CREATININE mg/dL 1 81*  < > 1 95*   CALCIUM mg/dL 8 2*  < > 8 4   TOTAL PROTEIN g/dL  --   --  6 8   BILIRUBIN TOTAL mg/dL  --   --  0 19*   ALK PHOS U/L  --   --  151*   ALT U/L  --   --  16   AST U/L  --   --  15   GLUCOSE RANDOM mg/dL 103  < > 166*   < > = values in this interval not displayed  * I Have Reviewed All Lab Data Listed Above  * Additional Pertinent Lab Tests Reviewed: [facility] Admission Reviewed    Imaging:    Imaging Reports Reviewed Today Include: all available     Recent Cultures (last 7 days):       Results from last 7 days  Lab Units 06/01/18  1916 06/01/18  1846   BLOOD CULTURE  No Growth at 48 hrs  No Growth at 48 hrs  Last 24 Hours Medication List:     Current Facility-Administered Medications:  acetaminophen 650 mg Oral Q6H PRN Jonna Raya PA-C   amitriptyline 75 mg Oral HS Jonna Raya PA-C   aspirin 81 mg Oral Daily Jonna Raya PA-C   atorvastatin 40 mg Oral Daily With Lynette Brothers CHRIS Raya   carvedilol 6 25 mg Oral BID With Meals [facility]   cefazolin 1,000 mg Intravenous Q8H Satinder Preciado MD   docusate sodium 100 mg Oral BID Jonna Raya PA-C   fluticasone-salmeterol 1 puff Inhalation Q12H [address] Jonna Raya PA-C   gabapentin 900 mg Oral TID Jonna Raya PA-C   heparin (porcine) 5,000 Units Subcutaneous Q8H [address] Jonna Raya PA-C   insulin glargine 20 Units Subcutaneous HS [facility]   insulin lispro 2-12 Units Subcutaneous 4x Daily (AC & HS) Jonna Raya PA-C   nicotine 1 patch Transdermal Daily Jonna Raya PA-C   ondansetron 4 mg Intravenous Q6H PRN Jonna Raya PA-C   oxyCODONE 5 mg Oral Q4H PRN Jonna Raya PA-C   polyethylene glycol 17 g Oral Daily Jonna Vega PA-C        Today, Patient Was Seen By: Satinder Preciado MD    ** Please Note: This note has been constructed using a voice recognition system   **

## 2018-06-04 NOTE — SOCIAL WORK
CM received consult regarding this patient  CMmet with patient to complete a general SW assessment and to discuss discharge needs; patient's friend Alan Archer- emergency cotnact) was present in the room  Patient reports living alone on the second floor apartment  Patient reports needs assistance with ADLs due to having difficulty bending over  Patient did not report who helps him now, states "he manages " CM will send referral to [facility] for discharge  Patient denies using DMEs prior to this hospital visit  Patient drives; reports he's able to either walk or call a cab  Patient uses [facility] on [address] in [place]  PCP identified as Dr Fredi العراقي  Patient reports not having a POA; reports his friend Alan Archer) is able to make medical decisions on his behalf if he was in that situation  No additional needs at present  CM to follow as needed

## 2018-06-04 NOTE — CONSULTS
2           Consultation - Nephrology   Delvis Bullard 56 y o  male MRN: [id number]  Unit/Bed#: E2 -01 Encounter: [id number]      Assessment/Plan     Assessment / Plan:  1  Renal     I suspect the patient has chronic kidney disease due to diabetic nephropathy as there is over 4 g of proteinuria that was checked in the past   Creatinine siva slightly likely due to IV diuretic  He was given this for scrotal edema but the edema is really due to inflammation from cellulitis  At this point would just hold diuretic monitor on oral intake continue routine medications  Creatinine is only slightly above baseline  Will likely need follow-up for chronic kidney disease once discharged  Avoid diuretic for now  2   Scrotal cellulitis    Antibiotics and Urology follow-up  Using scrotal support as well  History of Present Illness   Physician Requesting Consult: Deny Aguilar MD  Reason for Consult / Principal Problem:  Acute on chronic kidney disease  Hx and PE limited by:   HPI: Delvis Bullard is a 56y o  year old male who presents to the hospital for evaluation of swelling of his scrotum  He states it has been going on a couple weeks got worse was very painful  Talk to his family doctor who said if it got worse to go to the hospital which he did  He came into the hospital found to have scrotal cellulitis  We were asked to see him for fluctuating creatinine  History obtained from chart review and the patient  Inpatient consult to Nephrology  Consult performed by: Tereso Reason ordered by: Helena Quiroz          Review of Systems   Constitutional: Negative  Negative for chills and fever  HENT: Negative  Eyes: Negative  Respiratory: Negative  Negative for cough and shortness of breath  Cardiovascular: Negative  Gastrointestinal: Negative  Negative for abdominal distention, abdominal pain, diarrhea, nausea and vomiting  Genitourinary: Positive for scrotal swelling  Negative for difficulty urinating and hematuria  Musculoskeletal: Negative  Neurological: Negative  Historical Information   Patient Active Problem List   Diagnosis    Cellulitis of scrotum    Hydrocele    DILCIA [facility]) ([facility]    Constipation    Diabetes ([place] [address] )    Hypertension    Current smoker    At risk for noncompliance    Anemia     Past Medical History:   Diagnosis Date    Diabetes mellitus ([place] [address] )     Hyperlipidemia     Hypertension     Neuropathy      History reviewed  No pertinent surgical history  Social History   History   Alcohol Use No     History   Drug Use No     History   Smoking Status    Current Every Day Smoker    Packs/day: 1 00   Smokeless Tobacco    Never Used     History reviewed  No pertinent family history      Meds/Allergies   current meds:   Current Facility-Administered Medications   Medication Dose Route Frequency    acetaminophen (TYLENOL) tablet 650 mg  650 mg Oral Q6H PRN    amitriptyline (ELAVIL) tablet 75 mg  75 mg Oral HS    aspirin (ECOTRIN LOW STRENGTH) EC tablet 81 mg  81 mg Oral Daily    atorvastatin (LIPITOR) tablet 40 mg  40 mg Oral Daily With Dinner    carvedilol (COREG) tablet 6 25 mg  6 25 mg Oral BID With Meals    docusate sodium (COLACE) capsule 100 mg  100 mg Oral BID    fluticasone-salmeterol (ADVAIR) 250-50 mcg/dose inhaler 1 puff  1 puff Inhalation Q12H [facility]    gabapentin (NEURONTIN) capsule 900 mg  900 mg Oral TID    heparin (porcine) subcutaneous injection 5,000 Units  5,000 Units Subcutaneous Q8H [facility]    insulin glargine (LANTUS) subcutaneous injection 20 Units 0 2 mL  20 Units Subcutaneous HS    insulin lispro (HumaLOG) 100 units/mL subcutaneous injection 2-12 Units  2-12 Units Subcutaneous 4x Daily (AC & HS)    nicotine (NICODERM CQ) 14 mg/24hr TD 24 hr patch 1 patch  1 patch Transdermal Daily    ondansetron (ZOFRAN) injection 4 mg  4 mg Intravenous Q6H PRN    oxyCODONE (ROXICODONE) IR tablet 5 mg  5 mg Oral Q4H PRN  polyethylene glycol (MIRALAX) packet 17 g  17 g Oral Daily     No Known Allergies    Objective     Intake/Output Summary (Last 24 hours) at 06/04/18 1031  Last data filed at 06/04/18 0900   Gross per 24 hour   Intake              240 ml   Output             2275 ml   Net            -2035 ml     Body mass index is 37 01 kg/m²  Invasive Devices:        PHYSICAL EXAM:  /75 (BP Location: Right arm)   Pulse 80   Temp (!) 96 3 °F (35 7 °C) (Tympanic)   Resp 20   Ht 5' 10" (1 778 m)   Wt 117 kg (257 lb 15 oz)   SpO2 96%   BMI 37 01 kg/m²     Physical Exam   Constitutional: He is oriented to person, place, and time  He appears well-nourished  No distress  HENT:   Head: Atraumatic  Mouth/Throat: No oropharyngeal exudate  Eyes: No scleral icterus  Neck: Neck supple  No JVD present  Cardiovascular: Normal rate and regular rhythm  Exam reveals no friction rub  Pulmonary/Chest: Effort normal and breath sounds normal  No respiratory distress  He has no wheezes  He has no rales  Abdominal: Soft  Bowel sounds are normal  He exhibits no distension  There is no tenderness  There is no rebound  Neurological: He is alert and oriented to person, place, and time           Current Weight: Weight - Scale: 117 kg (257 lb 15 oz)  First Weight: Weight - Scale: 118 kg (260 lb)    Lab Results:      Results from last 7 days  Lab Units 06/04/18  0511   WBC Thousand/uL 4 46   HEMOGLOBIN g/dL 8 0*   HEMATOCRIT % 27 7*   PLATELETS Thousands/uL 189       Results from last 7 days  Lab Units 06/04/18  0511   SODIUM mmol/L 143   POTASSIUM mmol/L 5 0   CHLORIDE mmol/L 109*   CO2 mmol/L 28   BUN mg/dL 32*   CREATININE mg/dL 1 81*   GLUCOSE RANDOM mg/dL 103   CALCIUM mg/dL 8 2*       Results from last 7 days  Lab Units 06/04/18  0511  06/01/18  1846   SODIUM mmol/L 143  < > 142   POTASSIUM mmol/L 5 0  < > 4 9   CHLORIDE mmol/L 109*  < > 107   CO2 mmol/L 28  < > 26   BUN mg/dL 32*  < > 25   CREATININE mg/dL 1 81*  < > 1 95*   CALCIUM mg/dL 8 2*  < > 8 4   TOTAL PROTEIN g/dL  --   --  6 8   BILIRUBIN TOTAL mg/dL  --   --  0 19*   ALK PHOS U/L  --   --  151*   ALT U/L  --   --  16   AST U/L  --   --  15   GLUCOSE RANDOM mg/dL 103  < > 166*   < > = values in this interval not displayed

## 2018-06-05 ENCOUNTER — APPOINTMENT (INPATIENT)
Dept: ULTRASOUND IMAGING | Facility: HOSPITAL | Age: 58
DRG: 501 | End: 2018-06-05
Payer: COMMERCIAL

## 2018-06-05 LAB
ANION GAP SERPL CALCULATED.3IONS-SCNC: 7 MMOL/L (ref 4–13)
BASOPHILS # BLD AUTO: 0.01 THOUSANDS/ΜL (ref 0–0.1)
BASOPHILS NFR BLD AUTO: 0 % (ref 0–1)
BUN SERPL-MCNC: 38 MG/DL (ref 5–25)
CALCIUM SERPL-MCNC: 7.9 MG/DL (ref 8.3–10.1)
CHLORIDE SERPL-SCNC: 108 MMOL/L (ref 100–108)
CO2 SERPL-SCNC: 28 MMOL/L (ref 21–32)
CREAT SERPL-MCNC: 1.79 MG/DL (ref 0.6–1.3)
EOSINOPHIL # BLD AUTO: 0.15 THOUSAND/ΜL (ref 0–0.61)
EOSINOPHIL NFR BLD AUTO: 3 % (ref 0–6)
ERYTHROCYTE [DISTWIDTH] IN BLOOD BY AUTOMATED COUNT: 15.6 % (ref 11.6–15.1)
GFR SERPL CREATININE-BSD FRML MDRD: 42 ML/MIN/1.73SQ M
GLUCOSE SERPL-MCNC: 134 MG/DL (ref 65–140)
GLUCOSE SERPL-MCNC: 186 MG/DL (ref 65–140)
GLUCOSE SERPL-MCNC: 60 MG/DL (ref 65–140)
GLUCOSE SERPL-MCNC: 67 MG/DL (ref 65–140)
GLUCOSE SERPL-MCNC: 76 MG/DL (ref 65–140)
GLUCOSE SERPL-MCNC: 82 MG/DL (ref 65–140)
HCT VFR BLD AUTO: 27.6 % (ref 36.5–49.3)
HGB BLD-MCNC: 7.9 G/DL (ref 12–17)
LYMPHOCYTES # BLD AUTO: 1.44 THOUSANDS/ΜL (ref 0.6–4.47)
LYMPHOCYTES NFR BLD AUTO: 29 % (ref 14–44)
MCH RBC QN AUTO: 27.8 PG (ref 26.8–34.3)
MCHC RBC AUTO-ENTMCNC: 28.6 G/DL (ref 31.4–37.4)
MCV RBC AUTO: 97 FL (ref 82–98)
MONOCYTES # BLD AUTO: 0.62 THOUSAND/ΜL (ref 0.17–1.22)
MONOCYTES NFR BLD AUTO: 13 % (ref 4–12)
NEUTROPHILS # BLD AUTO: 2.7 THOUSANDS/ΜL (ref 1.85–7.62)
NEUTS SEG NFR BLD AUTO: 55 % (ref 43–75)
NRBC BLD AUTO-RTO: 0 /100 WBCS
PLATELET # BLD AUTO: 206 THOUSANDS/UL (ref 149–390)
PMV BLD AUTO: 10.1 FL (ref 8.9–12.7)
POTASSIUM SERPL-SCNC: 5.2 MMOL/L (ref 3.5–5.3)
RBC # BLD AUTO: 2.84 MILLION/UL (ref 3.88–5.62)
SODIUM SERPL-SCNC: 143 MMOL/L (ref 136–145)
WBC # BLD AUTO: 4.92 THOUSAND/UL (ref 4.31–10.16)

## 2018-06-05 PROCEDURE — 99232 SBSQ HOSP IP/OBS MODERATE 35: CPT | Performed by: INTERNAL MEDICINE

## 2018-06-05 PROCEDURE — 85025 COMPLETE CBC W/AUTO DIFF WBC: CPT | Performed by: INTERNAL MEDICINE

## 2018-06-05 PROCEDURE — 76770 US EXAM ABDO BACK WALL COMP: CPT

## 2018-06-05 PROCEDURE — 80048 BASIC METABOLIC PNL TOTAL CA: CPT | Performed by: INTERNAL MEDICINE

## 2018-06-05 PROCEDURE — 82948 REAGENT STRIP/BLOOD GLUCOSE: CPT

## 2018-06-05 PROCEDURE — 99233 SBSQ HOSP IP/OBS HIGH 50: CPT | Performed by: INTERNAL MEDICINE

## 2018-06-05 RX ORDER — FUROSEMIDE 10 MG/ML
40 INJECTION INTRAMUSCULAR; INTRAVENOUS ONCE
Status: COMPLETED | OUTPATIENT
Start: 2018-06-05 | End: 2018-06-05

## 2018-06-05 RX ADMIN — OXYCODONE HYDROCHLORIDE 5 MG: 5 TABLET ORAL at 11:24

## 2018-06-05 RX ADMIN — HEPARIN SODIUM 5000 UNITS: 5000 INJECTION, SOLUTION INTRAVENOUS; SUBCUTANEOUS at 21:33

## 2018-06-05 RX ADMIN — NICOTINE 1 PATCH: 14 PATCH TRANSDERMAL at 09:42

## 2018-06-05 RX ADMIN — GABAPENTIN 900 MG: 300 CAPSULE ORAL at 20:42

## 2018-06-05 RX ADMIN — CEFAZOLIN SODIUM 1000 MG: 1 SOLUTION INTRAVENOUS at 20:43

## 2018-06-05 RX ADMIN — CARVEDILOL 6.25 MG: 3.12 TABLET, FILM COATED ORAL at 09:40

## 2018-06-05 RX ADMIN — FLUTICASONE PROPIONATE AND SALMETEROL 1 PUFF: 50; 250 POWDER RESPIRATORY (INHALATION) at 09:42

## 2018-06-05 RX ADMIN — INSULIN GLARGINE 20 UNITS: 100 INJECTION, SOLUTION SUBCUTANEOUS at 21:33

## 2018-06-05 RX ADMIN — DOCUSATE SODIUM 100 MG: 100 CAPSULE, LIQUID FILLED ORAL at 17:12

## 2018-06-05 RX ADMIN — DESMOPRESSIN ACETATE 40 MG: 0.2 TABLET ORAL at 17:12

## 2018-06-05 RX ADMIN — ASPIRIN 81 MG: 81 TABLET, COATED ORAL at 09:40

## 2018-06-05 RX ADMIN — CARVEDILOL 6.25 MG: 3.12 TABLET, FILM COATED ORAL at 17:14

## 2018-06-05 RX ADMIN — DOCUSATE SODIUM 100 MG: 100 CAPSULE, LIQUID FILLED ORAL at 09:41

## 2018-06-05 RX ADMIN — AMITRIPTYLINE HYDROCHLORIDE 75 MG: 50 TABLET, FILM COATED ORAL at 21:33

## 2018-06-05 RX ADMIN — FERROUS SULFATE TAB 325 MG (65 MG ELEMENTAL FE) 325 MG: 325 (65 FE) TAB at 09:41

## 2018-06-05 RX ADMIN — CEFAZOLIN SODIUM 1000 MG: 1 SOLUTION INTRAVENOUS at 11:24

## 2018-06-05 RX ADMIN — HEPARIN SODIUM 5000 UNITS: 5000 INJECTION, SOLUTION INTRAVENOUS; SUBCUTANEOUS at 05:00

## 2018-06-05 RX ADMIN — GABAPENTIN 900 MG: 300 CAPSULE ORAL at 09:40

## 2018-06-05 RX ADMIN — INSULIN LISPRO 2 UNITS: 100 INJECTION, SOLUTION INTRAVENOUS; SUBCUTANEOUS at 21:33

## 2018-06-05 RX ADMIN — HEPARIN SODIUM 5000 UNITS: 5000 INJECTION, SOLUTION INTRAVENOUS; SUBCUTANEOUS at 13:03

## 2018-06-05 RX ADMIN — FERROUS SULFATE TAB 325 MG (65 MG ELEMENTAL FE) 325 MG: 325 (65 FE) TAB at 17:12

## 2018-06-05 RX ADMIN — POLYETHYLENE GLYCOL 3350 17 G: 17 POWDER, FOR SOLUTION ORAL at 09:41

## 2018-06-05 RX ADMIN — CEFAZOLIN SODIUM 1000 MG: 1 SOLUTION INTRAVENOUS at 04:54

## 2018-06-05 RX ADMIN — OXYCODONE HYDROCHLORIDE 5 MG: 5 TABLET ORAL at 05:00

## 2018-06-05 RX ADMIN — FUROSEMIDE 40 MG: 10 INJECTION, SOLUTION INTRAMUSCULAR; INTRAVENOUS at 13:03

## 2018-06-05 RX ADMIN — GABAPENTIN 900 MG: 300 CAPSULE ORAL at 15:07

## 2018-06-05 RX ADMIN — FLUTICASONE PROPIONATE AND SALMETEROL 1 PUFF: 50; 250 POWDER RESPIRATORY (INHALATION) at 20:43

## 2018-06-05 NOTE — PROGRESS NOTES
[facility] Progress Note  Patient: Cedrick Mason 56 y o  male   MRN: [id number]  PCP: Nicki Montalvo, DO  Unit/Bed#: E2 -01 Encounter: [id number]  Date Of Visit: 18    Assessment/Plan:  1  Scrotal edema- ?cellulitis, nephrotic syndrome   2  Diabetic Nephropathy   3  Nephrotic syndrome- likely 2/2 #2  4  MARK   5  COPD  6  Diastolic HF      -c/w abx  Trend fever curve and white count    -Echo with grade 2 diastolic dysfunction in addition to nephrotic syndrome  Diuresis per Nephrology  Will hold off ACEi since will be adding lasix  Monitor I/Os and BMP  -Iron supplementation   -PT, CM for home assistance     Subjective:   Feels ok  States scrotum is a little better  Objective:     Vitals:   Temp (24hrs), Av 6 °F (35 9 °C), Min:96 3 °F (35 7 °C), Max:97 1 °F (36 2 °C)    HR:  [71-83] 81  Resp:  [18-20] 18  BP: (116-162)/(68-98) 116/68  SpO2:  [93 %-97 %] 93 %  Body mass index is 37 01 kg/m²  Input and Output Summary (last 24 hours):        Intake/Output Summary (Last 24 hours) at 18 1032  Last data filed at 18 2200   Gross per 24 hour   Intake              100 ml   Output                0 ml   Net              100 ml       Physical Exam:     Physical Exam    GEN: NAD  HEENT: PERRL  CARDIO: s1 s2 RRR  LUNGS: CTA  ABD: Soft, NT, edema   : scrotal swelling, mild erythema   EXT: + LE edema     Additional Data:     Labs:      Results from last 7 days  Lab Units 18  0451   WBC Thousand/uL 4 92   HEMOGLOBIN g/dL 7 9*   HEMATOCRIT % 27 6*   PLATELETS Thousands/uL 206   NEUTROS PCT % 55   LYMPHS PCT % 29   MONOS PCT % 13*   EOS PCT % 3       Results from last 7 days  Lab Units 18  0451  18  1846   SODIUM mmol/L 143  < > 142   POTASSIUM mmol/L 5 2  < > 4 9   CHLORIDE mmol/L 108  < > 107   CO2 mmol/L 28  < > 26   BUN mg/dL 38*  < > 25   CREATININE mg/dL 1 79*  < > 1 95*   CALCIUM mg/dL 7 9*  < > 8 4   TOTAL PROTEIN g/dL  --   --  6 8   BILIRUBIN TOTAL mg/dL --   --  0 19*   ALK PHOS U/L  --   --  151*   ALT U/L  --   --  16   AST U/L  --   --  15   GLUCOSE RANDOM mg/dL 67  < > 166*   < > = values in this interval not displayed  * I Have Reviewed All Lab Data Listed Above  * Additional Pertinent Lab Tests Reviewed: Kimi Knott Admission Reviewed    Imaging:    Imaging Reports Reviewed Today Include: all available     Recent Cultures (last 7 days):       Results from last 7 days  Lab Units 06/01/18  1916 06/01/18  1846   BLOOD CULTURE  No Growth at 72 hrs  No Growth at 72 hrs  Last 24 Hours Medication List:     Current Facility-Administered Medications:  acetaminophen 650 mg Oral Q6H PRN Jonna Raya PA-C    amitriptyline 75 mg Oral HS Jonna Raya PA-C    aspirin 81 mg Oral Daily Jonna Raya PA-C    atorvastatin 40 mg Oral Daily With Big Chantell Raya PA-C    carvedilol 6 25 mg Oral BID With Meals [facility],     cefazolin 1,000 mg Intravenous Q8H Peter Chahal MD Last Rate: Stopped (06/05/18 0524)   docusate sodium 100 mg Oral BID Jonna Raya PA-C    ferrous sulfate 325 mg Oral BID With Meals Peter Chahal MD    fluticasone-salmeterol 1 puff Inhalation Q12H [address] Jonna Raya PA-C    gabapentin 900 mg Oral TID Jonna Raya PA-C    heparin (porcine) 5,000 Units Subcutaneous Q8H [address] Jonna Raya PA-C    insulin glargine 20 Units Subcutaneous HS [facility],     insulin lispro 2-12 Units Subcutaneous 4x Daily (AC & HS) Jonna Raya PA-C    nicotine 1 patch Transdermal Daily Jonna Raya PA-C    ondansetron 4 mg Intravenous Q6H PRN Jonna Raya PA-C    oxyCODONE 5 mg Oral Q4H PRN Jonna Raya PA-C    polyethylene glycol 17 g Oral Daily Jonna Craven PA-C         Today, Patient Was Seen By: Peter Chahal MD    ** Please Note: This note has been constructed using a voice recognition system   **

## 2018-06-05 NOTE — PROGRESS NOTES
NEPHROLOGY PROGRESS NOTE    Jaya Ramos 56 y o  male MRN: [id number]  Unit/Bed#: E2 -01 Encounter: [id number]  Reason for Consult:  Chronic kidney disease    The patient feels that his scrotal swelling is improved slightly  He is having no trouble urinating  He is eating well and no other acute changes or complaints  ASSESSMENT/PLAN:  1  Renal    The patient has chronic kidney disease likely due to diabetic nephropathy  He has protein excretion in excess of 4 g on estimation and creatinine is likely at baseline  He will need long-term follow-up for his chronic kidney disease but appears to be at his baseline and there is no acute issue  He does have significant lower extremity swelling which he does not really complain about but is present  Anasarca in the lower extremities could be related to nephrotic syndrome  I spoke to the primary physician and we have decided to begin diuresis  I would hold off on ACE-inhibitor ARB during diuresis to make sure creatinine 1 pump but long-term will need that to help reduce proteinuria  Lasix 40 mg IV  Monitor volume status and renal function  May need to increase to b i d  dosing tomorrow    2  Scrotal edema and swelling  On treatment for scrotal cellulitis  Urology was consulted  On cefazolin IV  SUBJECTIVE:  Review of Systems   Constitution: Negative  HENT: Negative  Eyes: Negative  Cardiovascular: Negative  Respiratory: Negative  Gastrointestinal: Negative  Genitourinary: Negative for dysuria, hematuria and incomplete emptying  Scrotal swelling   Neurological: Negative  OBJECTIVE:  Current Weight: Weight - Scale: 117 kg (257 lb 15 oz)  Vitals:Temp (24hrs), Av 6 °F (35 9 °C), Min:96 3 °F (35 7 °C), Max:97 1 °F (36 2 °C)  Current: Temperature: (!) 96 3 °F (35 7 °C)   Blood pressure 116/68, pulse 81, temperature (!) 96 3 °F (35 7 °C), temperature source Tympanic, resp   rate 18, height 5' 10" (1 778 m), weight 117 kg (257 lb 15 oz), SpO2 93 %  , Body mass index is 37 01 kg/m²  Intake/Output Summary (Last 24 hours) at 06/05/18 0907  Last data filed at 06/04/18 2200   Gross per 24 hour   Intake              100 ml   Output                0 ml   Net              100 ml       Physical Exam: /68 (BP Location: Left arm)   Pulse 81   Temp (!) 96 3 °F (35 7 °C) (Tympanic)   Resp 18   Ht 5' 10" (1 778 m)   Wt 117 kg (257 lb 15 oz)   SpO2 93%   BMI 37 01 kg/m²   Physical Exam   Constitutional: He is oriented to person, place, and time  He appears well-nourished  No distress  Eyes: No scleral icterus  Neck: Normal range of motion  Neck supple  No JVD present  Cardiovascular: Normal rate and regular rhythm  Exam reveals no friction rub  Positive edema   Pulmonary/Chest: Effort normal and breath sounds normal  No respiratory distress  He has no wheezes  He has no rales  Abdominal: Soft  Bowel sounds are normal  He exhibits no distension  There is no tenderness  There is no rebound  Neurological: He is alert and oriented to person, place, and time         Medications:    Current Facility-Administered Medications:     acetaminophen (TYLENOL) tablet 650 mg, 650 mg, Oral, Q6H PRN, Jonna Raya PA-C, 650 mg at 06/04/18 1936    amitriptyline (ELAVIL) tablet 75 mg, 75 mg, Oral, HS, Jonna Raya PA-C, 75 mg at 06/04/18 2133    aspirin (ECOTRIN LOW STRENGTH) EC tablet 81 mg, 81 mg, Oral, Daily, Jonna Raya PA-C, 81 mg at 06/04/18 0908    atorvastatin (LIPITOR) tablet 40 mg, 40 mg, Oral, Daily With Dinner, Maxine Willoughby PA-C, 40 mg at 06/04/18 1642    carvedilol (COREG) tablet 6 25 mg, 6 25 mg, Oral, BID With Meals, Braxton Grimaldo DO, 6 25 mg at 06/04/18 1642    ceFAZolin (ANCEF) IVPB (premix) 1,000 mg, 1,000 mg, Intravenous, Q8H, Deny Aguilar MD, Stopped at 06/05/18 0524    docusate sodium (COLACE) capsule 100 mg, 100 mg, Oral, BID, Jonna Raya PA-C, 100 mg at 06/04/18 0796    ferrous sulfate tablet 325 mg, 325 mg, Oral, BID With Meals, Deny Aguilar MD, 325 mg at 06/04/18 1642    fluticasone-salmeterol (ADVAIR) 250-50 mcg/dose inhaler 1 puff, 1 puff, Inhalation, Q12H [address], Jonna Raya PA-C, 1 puff at 06/04/18 2132    gabapentin (NEURONTIN) capsule 900 mg, 900 mg, Oral, TID, Jonna Raya PA-C, 900 mg at 06/04/18 2132    heparin (porcine) subcutaneous injection 5,000 Units, 5,000 Units, Subcutaneous, Q8H [address], 5,000 Units at 06/05/18 0500 **AND** [CANCELED] Platelet count, , , Once, Jonna Raya PA-C    insulin glargine (LANTUS) subcutaneous injection 20 Units 0 2 mL, 20 Units, Subcutaneous, HS, Cy Dynes, DO, 20 Units at 06/04/18 2136    insulin lispro (HumaLOG) 100 units/mL subcutaneous injection 2-12 Units, 2-12 Units, Subcutaneous, 4x Daily (AC & HS), 2 Units at 06/04/18 1645 **AND** Fingerstick Glucose (POCT), , , 4x Daily AC and at bedtime, Jonna Raya PA-C    nicotine (NICODERM CQ) 14 mg/24hr TD 24 hr patch 1 patch, 1 patch, Transdermal, Daily, Jonna Raya PA-C, 1 patch at 06/04/18 0908    ondansetron (ZOFRAN) injection 4 mg, 4 mg, Intravenous, Q6H PRN, Jonna Raya PA-C    oxyCODONE (ROXICODONE) IR tablet 5 mg, 5 mg, Oral, Q4H PRN, Jonna Raya PA-C, 5 mg at 06/05/18 0500    polyethylene glycol (MIRALAX) packet 17 g, 17 g, Oral, Daily, Jonna Raya PA-C, 17 g at 06/04/18 0908    Laboratory Results:  Lab Results   Component Value Date    WBC 4 92 06/05/2018    HGB 7 9 (L) 06/05/2018    HCT 27 6 (L) 06/05/2018    MCV 97 06/05/2018     06/05/2018     Lab Results   Component Value Date    GLUCOSE 67 06/05/2018    CALCIUM 7 9 (L) 06/05/2018     06/05/2018    K 5 2 06/05/2018    CO2 28 06/05/2018     06/05/2018    BUN 38 (H) 06/05/2018    CREATININE 1 79 (H) 06/05/2018     Lab Results   Component Value Date    CALCIUM 7 9 (L) 06/05/2018     No results found for: LABPROT

## 2018-06-06 LAB
ANION GAP SERPL CALCULATED.3IONS-SCNC: 4 MMOL/L (ref 4–13)
ANION GAP SERPL CALCULATED.3IONS-SCNC: 7 MMOL/L (ref 4–13)
ANION GAP SERPL CALCULATED.3IONS-SCNC: 8 MMOL/L (ref 4–13)
BACTERIA BLD CULT: NORMAL
BACTERIA BLD CULT: NORMAL
BASOPHILS # BLD AUTO: 0.02 THOUSANDS/ΜL (ref 0–0.1)
BASOPHILS NFR BLD AUTO: 0 % (ref 0–1)
BUN SERPL-MCNC: 45 MG/DL (ref 5–25)
BUN SERPL-MCNC: 45 MG/DL (ref 5–25)
BUN SERPL-MCNC: 46 MG/DL (ref 5–25)
CALCIUM SERPL-MCNC: 7.9 MG/DL (ref 8.3–10.1)
CALCIUM SERPL-MCNC: 8 MG/DL (ref 8.3–10.1)
CALCIUM SERPL-MCNC: 8.1 MG/DL (ref 8.3–10.1)
CHLORIDE SERPL-SCNC: 106 MMOL/L (ref 100–108)
CO2 SERPL-SCNC: 26 MMOL/L (ref 21–32)
CO2 SERPL-SCNC: 28 MMOL/L (ref 21–32)
CO2 SERPL-SCNC: 29 MMOL/L (ref 21–32)
CREAT SERPL-MCNC: 2.1 MG/DL (ref 0.6–1.3)
CREAT SERPL-MCNC: 2.11 MG/DL (ref 0.6–1.3)
CREAT SERPL-MCNC: 2.14 MG/DL (ref 0.6–1.3)
EOSINOPHIL # BLD AUTO: 0.08 THOUSAND/ΜL (ref 0–0.61)
EOSINOPHIL NFR BLD AUTO: 1 % (ref 0–6)
ERYTHROCYTE [DISTWIDTH] IN BLOOD BY AUTOMATED COUNT: 15.6 % (ref 11.6–15.1)
GFR SERPL CREATININE-BSD FRML MDRD: 34 ML/MIN/1.73SQ M
GLUCOSE SERPL-MCNC: 109 MG/DL (ref 65–140)
GLUCOSE SERPL-MCNC: 109 MG/DL (ref 65–140)
GLUCOSE SERPL-MCNC: 113 MG/DL (ref 65–140)
GLUCOSE SERPL-MCNC: 123 MG/DL (ref 65–140)
GLUCOSE SERPL-MCNC: 78 MG/DL (ref 65–140)
GLUCOSE SERPL-MCNC: 84 MG/DL (ref 65–140)
GLUCOSE SERPL-MCNC: 84 MG/DL (ref 65–140)
GLUCOSE SERPL-MCNC: 90 MG/DL (ref 65–140)
HCT VFR BLD AUTO: 28.1 % (ref 36.5–49.3)
HGB BLD-MCNC: 8 G/DL (ref 12–17)
LYMPHOCYTES # BLD AUTO: 1.16 THOUSANDS/ΜL (ref 0.6–4.47)
LYMPHOCYTES NFR BLD AUTO: 21 % (ref 14–44)
MCH RBC QN AUTO: 27.5 PG (ref 26.8–34.3)
MCHC RBC AUTO-ENTMCNC: 28.5 G/DL (ref 31.4–37.4)
MCV RBC AUTO: 97 FL (ref 82–98)
MONOCYTES # BLD AUTO: 0.53 THOUSAND/ΜL (ref 0.17–1.22)
MONOCYTES NFR BLD AUTO: 10 % (ref 4–12)
NEUTROPHILS # BLD AUTO: 3.8 THOUSANDS/ΜL (ref 1.85–7.62)
NEUTS SEG NFR BLD AUTO: 68 % (ref 43–75)
NRBC BLD AUTO-RTO: 0 /100 WBCS
PLATELET # BLD AUTO: 207 THOUSANDS/UL (ref 149–390)
PMV BLD AUTO: 9.8 FL (ref 8.9–12.7)
POTASSIUM SERPL-SCNC: 5.9 MMOL/L (ref 3.5–5.3)
POTASSIUM SERPL-SCNC: 6 MMOL/L (ref 3.5–5.3)
POTASSIUM SERPL-SCNC: 6.2 MMOL/L (ref 3.5–5.3)
RBC # BLD AUTO: 2.91 MILLION/UL (ref 3.88–5.62)
SODIUM SERPL-SCNC: 139 MMOL/L (ref 136–145)
SODIUM SERPL-SCNC: 140 MMOL/L (ref 136–145)
SODIUM SERPL-SCNC: 141 MMOL/L (ref 136–145)
WBC # BLD AUTO: 5.59 THOUSAND/UL (ref 4.31–10.16)

## 2018-06-06 PROCEDURE — 80048 BASIC METABOLIC PNL TOTAL CA: CPT | Performed by: INTERNAL MEDICINE

## 2018-06-06 PROCEDURE — 99233 SBSQ HOSP IP/OBS HIGH 50: CPT | Performed by: INTERNAL MEDICINE

## 2018-06-06 PROCEDURE — 82948 REAGENT STRIP/BLOOD GLUCOSE: CPT

## 2018-06-06 PROCEDURE — G8979 MOBILITY GOAL STATUS: HCPCS

## 2018-06-06 PROCEDURE — 85025 COMPLETE CBC W/AUTO DIFF WBC: CPT | Performed by: INTERNAL MEDICINE

## 2018-06-06 PROCEDURE — G8978 MOBILITY CURRENT STATUS: HCPCS

## 2018-06-06 PROCEDURE — 97163 PT EVAL HIGH COMPLEX 45 MIN: CPT

## 2018-06-06 RX ORDER — DEXTROSE MONOHYDRATE 25 G/50ML
25 INJECTION, SOLUTION INTRAVENOUS ONCE
Status: COMPLETED | OUTPATIENT
Start: 2018-06-06 | End: 2018-06-06

## 2018-06-06 RX ORDER — SODIUM POLYSTYRENE SULFONATE 15 G/60ML
15 SUSPENSION ORAL; RECTAL ONCE
Status: COMPLETED | OUTPATIENT
Start: 2018-06-06 | End: 2018-06-06

## 2018-06-06 RX ORDER — SODIUM POLYSTYRENE SULFONATE 15 G/60ML
30 SUSPENSION ORAL; RECTAL ONCE
Status: COMPLETED | OUTPATIENT
Start: 2018-06-06 | End: 2018-06-06

## 2018-06-06 RX ADMIN — HEPARIN SODIUM 5000 UNITS: 5000 INJECTION, SOLUTION INTRAVENOUS; SUBCUTANEOUS at 05:01

## 2018-06-06 RX ADMIN — DESMOPRESSIN ACETATE 40 MG: 0.2 TABLET ORAL at 16:42

## 2018-06-06 RX ADMIN — CEFAZOLIN SODIUM 1000 MG: 1 SOLUTION INTRAVENOUS at 20:17

## 2018-06-06 RX ADMIN — CARVEDILOL 6.25 MG: 3.12 TABLET, FILM COATED ORAL at 16:42

## 2018-06-06 RX ADMIN — OXYCODONE HYDROCHLORIDE 5 MG: 5 TABLET ORAL at 20:21

## 2018-06-06 RX ADMIN — INSULIN GLARGINE 20 UNITS: 100 INJECTION, SOLUTION SUBCUTANEOUS at 21:53

## 2018-06-06 RX ADMIN — ASPIRIN 81 MG: 81 TABLET, COATED ORAL at 08:01

## 2018-06-06 RX ADMIN — OXYCODONE HYDROCHLORIDE 5 MG: 5 TABLET ORAL at 05:01

## 2018-06-06 RX ADMIN — DOCUSATE SODIUM 100 MG: 100 CAPSULE, LIQUID FILLED ORAL at 19:18

## 2018-06-06 RX ADMIN — HEPARIN SODIUM 5000 UNITS: 5000 INJECTION, SOLUTION INTRAVENOUS; SUBCUTANEOUS at 21:53

## 2018-06-06 RX ADMIN — DOCUSATE SODIUM 100 MG: 100 CAPSULE, LIQUID FILLED ORAL at 08:00

## 2018-06-06 RX ADMIN — DEXTROSE MONOHYDRATE 25 ML: 25 INJECTION, SOLUTION INTRAVENOUS at 16:42

## 2018-06-06 RX ADMIN — FERROUS SULFATE TAB 325 MG (65 MG ELEMENTAL FE) 325 MG: 325 (65 FE) TAB at 07:58

## 2018-06-06 RX ADMIN — FLUTICASONE PROPIONATE AND SALMETEROL 1 PUFF: 50; 250 POWDER RESPIRATORY (INHALATION) at 08:03

## 2018-06-06 RX ADMIN — POLYETHYLENE GLYCOL 3350 17 G: 17 POWDER, FOR SOLUTION ORAL at 08:00

## 2018-06-06 RX ADMIN — CEFAZOLIN SODIUM 1000 MG: 1 SOLUTION INTRAVENOUS at 14:27

## 2018-06-06 RX ADMIN — AMITRIPTYLINE HYDROCHLORIDE 75 MG: 50 TABLET, FILM COATED ORAL at 21:53

## 2018-06-06 RX ADMIN — SODIUM POLYSTYRENE SULFONATE 30 G: 15 SUSPENSION ORAL; RECTAL at 09:48

## 2018-06-06 RX ADMIN — DEXTROSE MONOHYDRATE 25 ML: 25 INJECTION, SOLUTION INTRAVENOUS at 08:33

## 2018-06-06 RX ADMIN — GABAPENTIN 900 MG: 300 CAPSULE ORAL at 16:42

## 2018-06-06 RX ADMIN — GABAPENTIN 900 MG: 300 CAPSULE ORAL at 20:16

## 2018-06-06 RX ADMIN — FERROUS SULFATE TAB 325 MG (65 MG ELEMENTAL FE) 325 MG: 325 (65 FE) TAB at 16:46

## 2018-06-06 RX ADMIN — GABAPENTIN 900 MG: 300 CAPSULE ORAL at 08:01

## 2018-06-06 RX ADMIN — SODIUM CHLORIDE: 9 INJECTION INTRAMUSCULAR; INTRAVENOUS; SUBCUTANEOUS at 09:47

## 2018-06-06 RX ADMIN — CEFAZOLIN SODIUM 1000 MG: 1 SOLUTION INTRAVENOUS at 04:54

## 2018-06-06 RX ADMIN — CARVEDILOL 6.25 MG: 3.12 TABLET, FILM COATED ORAL at 07:58

## 2018-06-06 RX ADMIN — FLUTICASONE PROPIONATE AND SALMETEROL 1 PUFF: 50; 250 POWDER RESPIRATORY (INHALATION) at 20:17

## 2018-06-06 RX ADMIN — SODIUM POLYSTYRENE SULFONATE 15 G: 15 SUSPENSION ORAL; RECTAL at 14:27

## 2018-06-06 RX ADMIN — NICOTINE 1 PATCH: 14 PATCH TRANSDERMAL at 08:00

## 2018-06-06 RX ADMIN — HEPARIN SODIUM 5000 UNITS: 5000 INJECTION, SOLUTION INTRAVENOUS; SUBCUTANEOUS at 14:27

## 2018-06-06 RX ADMIN — SODIUM CHLORIDE 10 UNITS: 9 INJECTION, SOLUTION INTRAVENOUS at 16:46

## 2018-06-06 NOTE — PLAN OF CARE
Problem: PHYSICAL THERAPY ADULT  Goal: Performs mobility at highest level of function for planned discharge setting  See evaluation for individualized goals  Treatment/Interventions: Functional transfer training, LE strengthening/ROM, Therapeutic exercise, Endurance training, Cognitive reorientation, Patient/family training, Equipment eval/education, Bed mobility, Gait training, Spoke to nursing  Equipment Recommended:  (defer at this time)       See flowsheet documentation for full assessment, interventions and recommendations  Prognosis: Fair  Problem List: (S) Decreased strength, Decreased endurance, Impaired balance, Decreased mobility, Decreased coordination, Decreased cognition, Decreased safety awareness, Impaired vision, Obesity, Decreased skin integrity, Pain (Pt c/o blurry vision that started this morning after fall)  Assessment: PT consult received  Pt is 57 yo male admitted from home w/ friend for cellulitis of scrotum  Pt identified with 2 patient identifiers and agreeable to PT evaluation  PTA pt reports (I) functional mobility no AD, 1 recent fall at home, can drive but doesn't have a car, 2 FOS to enter home, PRN assistance for lower body dressing  Pt had fall on third shift this morning in bathroom  Pt called for nurses help, pt found on floor on knees  Pt reports that his knees just gave out on him  Pt currently presents w/ c/o pain in scrotum, sit<>stand modAx2 w/ RW, gait training modAx2 w/ RW, BLE MMT 4-/5, and impaired LUE shoulder  Pt would benefit from continued skilled PT for deficits in strength, balance, functional mobility, safety, functional endurance, and gait  At this time recommend d/c short term rehab  Barriers to Discharge: Inaccessible home environment  Barriers to Discharge Comments: 2 FOS to enter  Recommendation: Short-term skilled PT     PT - OK to Discharge: Yes (to rehab once medically stable)    See flowsheet documentation for full assessment         Comments: Arsalan Trevino Gemma Huizar, PT,DPT

## 2018-06-06 NOTE — PROGRESS NOTES
Pt yelled for assistance from bathroom as RN walked passed room  Pt was found to have fallen to knees  Urine was found all over floor, pt denies slipping in urine but socks were wet  VS/BS checked  Pt c/o of 4/10 knee pain but no visible injury to knees  Pt assisted back to bed with assistance of of PCAs  Pt has been ambulating independently since admission  Bed alarm placed, instructed to pt to call for assistance    Pt verbalized understanding  Will continue to monitor

## 2018-06-06 NOTE — PROGRESS NOTES
[facility] Internal Medicine Progress Note  Patient: Nadiya Friday 56 y o  male   MRN: [id number]  PCP: Omar Corona, DO  Unit/Bed#: E2 -01 Encounter: [id number]  Date Of Visit: 18    Assessment/Plan:  1  Scrotal edema- ?cellulitis, nephrotic syndrome   2  Diabetic Nephropathy   3  Nephrotic syndrome- likely 2/2 #2  4  MARK   5  COPD  6  Diastolic HF      -c/w abx  Trend fever curve and white count    -Echo with grade 2 diastolic dysfunction in addition to nephrotic syndrome  Cr with rise today  Hold diuretics for now, pt to be bladder scanned as not sure if IOs are accurate  Hold ACEi  Monitor BMP  Appreciate nephro input     -Iron supplementation   -PT, CM for home assistance     Subjective:   States felt weak when walking to bathroom and his legs gave out and fell on his knees  Says not to much pain right now  Says scrotum feels the same  Objective:     Vitals:   Temp (24hrs), Av 6 °F (36 4 °C), Min:97 4 °F (36 3 °C), Max:97 9 °F (36 6 °C)    HR:  [] 93  Resp:  [16-18] 18  BP: (151-187)/(77-93) 151/77  SpO2:  [90 %-96 %] 92 %  Body mass index is 37 01 kg/m²  Input and Output Summary (last 24 hours):        Intake/Output Summary (Last 24 hours) at 18 1143  Last data filed at 18 0900   Gross per 24 hour   Intake              390 ml   Output             1650 ml   Net            -1260 ml       Physical Exam:     Physical Exam    GEN: NAD  HEENT: PERRL  CARDIO: s1 s2 RRR  LUNGS: CTA  ABD: Soft, NT, edema   : scrotal swelling, erythema   EXT: + LE edema     Additional Data:     Labs:      Results from last 7 days  Lab Units 18  0451   WBC Thousand/uL 5 59   HEMOGLOBIN g/dL 8 0*   HEMATOCRIT % 28 1*   PLATELETS Thousands/uL 207   NEUTROS PCT % 68   LYMPHS PCT % 21   MONOS PCT % 10   EOS PCT % 1       Results from last 7 days  Lab Units 18  0451  18  1846   SODIUM mmol/L 141  < > 142   POTASSIUM mmol/L 6 2*  < > 4 9   CHLORIDE mmol/L 106  < > 107   CO2 mmol/L 28  < > 26   BUN mg/dL 45*  < > 25   CREATININE mg/dL 2 10*  < > 1 95*   CALCIUM mg/dL 8 0*  < > 8 4   TOTAL PROTEIN g/dL  --   --  6 8   BILIRUBIN TOTAL mg/dL  --   --  0 19*   ALK PHOS U/L  --   --  151*   ALT U/L  --   --  16   AST U/L  --   --  15   GLUCOSE RANDOM mg/dL 113  < > 166*   < > = values in this interval not displayed  * I Have Reviewed All Lab Data Listed Above  * Additional Pertinent Lab Tests Reviewed: All Labs For Current Hospital Admission Reviewed    Imaging:    Imaging Reports Reviewed Today Include: All available   Recent Cultures (last 7 days):       Results from last 7 days  Lab Units 06/01/18  1916 06/01/18  1846   BLOOD CULTURE  No Growth After 4 Days  No Growth After 4 Days         Last 24 Hours Medication List:     Current Facility-Administered Medications:  acetaminophen 650 mg Oral Q6H PRN Jonna Raya PA-C    amitriptyline 75 mg Oral HS Jonna Raya PA-C    aspirin 81 mg Oral Daily Jonna Raya PA-C    atorvastatin 40 mg Oral Daily With Lynette Brothers CHRIS Raya    carvedilol 6 25 mg Oral BID With Meals Anand Orosco DO    cefazolin 1,000 mg Intravenous Q8H Raphael Martínez MD Last Rate: 1,000 mg (06/06/18 0454)   docusate sodium 100 mg Oral BID Jonna Raya PA-C    ferrous sulfate 325 mg Oral BID With Meals Raphael Martínez MD    fluticasone-salmeterol 1 puff Inhalation Q12H [address] Jonna Raya PA-C    gabapentin 900 mg Oral TID Jonna Raya PA-C    heparin (porcine) 5,000 Units Subcutaneous Q8H Odalys Babb PA-C    insulin glargine 20 Units Subcutaneous HS Anand Orosco DO    insulin lispro 2-12 Units Subcutaneous 4x Daily (AC & HS) Jonna Raya PA-C    nicotine 1 patch Transdermal Daily Jonna Raya PA-C    ondansetron 4 mg Intravenous Q6H PRN Jonna Raya PA-C    oxyCODONE 5 mg Oral Q4H PRN Jonna Raya PA-C    polyethylene glycol 17 g Oral Daily Jonna Raya PA-C    sodium polystyrene sulfonate 15 g Oral Once Bacilio Ruiz MD         Today, Patient Was Seen By: Deny Aguilar MD    ** Please Note: This note has been constructed using a voice recognition system   **

## 2018-06-06 NOTE — PHYSICAL THERAPY NOTE
PT EVALUATION  Time-In:1200  Time-Out:1220  Total Time:20 minutes    56 y o     [id number]    Cellulitis of scrotum [N49 2]  Testicle swelling [N50 89]    Past Medical History:   Diagnosis Date    Diabetes mellitus ([facility] [address] )     Hyperlipidemia     Hypertension     Neuropathy          History reviewed  No pertinent surgical history  06/06/18 1220   Note Type   Note type Eval only   Pain Assessment   Pain Assessment FLACC   Pain Location Scrotum   Hospital Pain Intervention(s) Ambulation/increased activity; Emotional support; Rest   Pain Rating: FLACC (Rest) - Face 0   Pain Rating: FLACC (Rest) - Legs 0   Pain Rating: FLACC (Rest) - Activity 0   Pain Rating: FLACC (Rest) - Cry 0   Pain Rating: FLACC (Rest) - Consolability 0   Score: FLACC (Rest) 0   Pain Rating: FLACC (Activity) - Face 1   Pain Rating: FLACC (Activity) - Legs 1   Pain Rating: FLACC (Activity) - Activity 1   Pain Rating: FLACC (Activity) - Cry 1   Pain Rating: FLACC (Activity) - Consolability 1   Score: FLACC (Activity) 5   Home Living   Type of Home House   Home Layout One level;Stairs to enter with rails  (2 FOS to enter)   Home Equipment Other (Comment)  (no)   Additional Comments lives in 2 story home w/ 2 flights of stairs to enter   Prior Function   Level of [place] Independent with ADLs and functional mobility   Lives With Friend(s)   Receives Help From Friend(s)   ADL Assistance Independent   IADLs Independent   Falls in the last 6 months 1 to 4   Comments Pt lives with friend  PTA pt reports (I) functional mobility no use of AD, h/o 1 fall recently, occasionally needs help with lower body dressing, [place], has a license but does not have a car to drive   Restrictions/Precautions   Weight Bearing Precautions Per Order No   Other Precautions Fall Risk;Pain   General   Additional Pertinent History pt had fall during 3rd shift this morning in the bathroom  pt was found on his knees by nursing   pt reports that his knees just gave out Family/Caregiver Present Yes  (friend/roommate)   Cognition   Overall Cognitive Status Impaired   Arousal/Participation Alert   Orientation Level Oriented X4   Memory Decreased recall of precautions   Following Commands Follows one step commands with increased time or repetition   Comments slow processing   RUE Assessment   RUE Assessment WFL   RUE Strength   RUE Overall Strength Within Functional Limits - able to perform ADL tasks with strength   LUE Assessment   LUE Assessment X   LUE Overall AROM   L Shoulder Flexion ~45% motion; limited overhead motion d/t pain in shoulder and weakness   LUE Strength   LUE Overall Strength Within Functional Limits - able to perform ADL tasks with strength  (in available range)   RLE Assessment   RLE Assessment X   Strength RLE   RLE Overall Strength 4-/5   LLE Assessment   LLE Assessment X   Strength LLE   LLE Overall Strength 4-/5   Coordination   Movements are Fluid and Coordinated 0   Coordination and Movement Description movements appear ataxic, delayed initiation, and difficulty motor planning   Sensation X   Light Touch   RLE Light Touch Impaired   RLE Light Touch Comments numbness/tingling in foot   LLE Light Touch Impaired   LLE Light Touch Comments numbness/tingling in foot   Sharp/Dull   RLE Sharp/Dull Not tested   LLE Sharp/Dull Not tested   Proprioception   RLE Proprioception Impaired   LLE Proprioception Impaired   Bed Mobility   Additional Comments not assess pt recieve sitting in bedside chair and left sitting in bedside chair at end of evaluation   Transfers   Sit to Stand 3  Moderate assistance   Additional items Assist x 2; Increased time required;Verbal cues;Armrests   Stand to Sit 3  Moderate assistance   Additional items Assist x 2; Increased time required;Verbal cues;Armrests   Additional Comments Pt needing VC and tactile cueing for safe technique and hand placement  Pt needing multiple attempts (3) before successful stand   Movmements are jerky Ambulation/Elevation   Gait pattern Ataxia; Decreased foot clearance; Improper Weight shift; Wide MARY;L Knee Marky; Short stride   Gait Assistance 3  Moderate assist   Additional items Assist x 2;Verbal cues; Tactile cues  (chair follow)   Assistive Device Rolling walker   Distance 4ftx1   Balance   Static Sitting Fair   Dynamic Sitting Fair -   Static Standing Poor   Dynamic Standing Poor   Ambulatory Poor   Endurance Deficit   Endurance Deficit Yes   Endurance Deficit Description fatigue, discomfort, decreased coordination   Activity Tolerance   Activity Tolerance Patient limited by fatigue   Nurse Made Aware RAMIREZ Romero   Assessment   Prognosis Fair   Problem List Decreased strength;Decreased endurance; Impaired balance;Decreased mobility; Decreased coordination;Decreased cognition;Decreased safety awareness; Impaired vision;Obesity; Decreased skin integrity;Pain  (Pt c/o blurry vision that started this morning after fall)   Assessment PT consult received  Pt is 57 yo male admitted from home w/ friend for cellulitis of scrotum  Pt identified with 2 patient identifiers and agreeable to PT evaluation  PTA pt reports (I) functional mobility no AD, 1 recent fall at home, can drive but doesn't have a car, 2 FOS to enter home, PRN assistance for lower body dressing  Pt had fall on third shift this morning in bathroom  Pt called for nurses help, pt found on floor on knees  Pt reports that his knees just gave out on him  Pt currently presents w/ c/o pain in scrotum, sit<>stand modAx2 w/ RW, gait training modAx2 w/ RW, BLE MMT 4-/5, and impaired LUE shoulder  Pt would benefit from continued skilled PT for deficits in strength, balance, functional mobility, safety, functional endurance, and gait  At this time recommend d/c short term rehab     Barriers to Discharge Inaccessible home environment   Barriers to Discharge Comments 2 FOS to enter   Goals   Patient Goals "to get stronger"   STG Expiration Date 06/20/18   Short Term Goal #1 In 2 weeks pt will demonstrate: bed mobility S for OOB mobility, sit<>stand and functional transfers minAx1 for OOB mobility, gait training [id number] minAx1 w/ RW for negotiation around room, improve BLE by 1/2 grade to improve strength to optimize functional mobility, improve balance by 1 grade to decrease fall risk, improve activity tolerance to >45 minutes activity to improve functional endurance   Treatment Day 0   Plan   Treatment/Interventions Functional transfer training;LE strengthening/ROM; Therapeutic exercise; Endurance training;Cognitive reorientation;Patient/family training;Equipment eval/education; Bed mobility;Gait training;Spoke to nursing   PT Frequency (4-5x/wk)   Recommendation   Recommendation Short-term skilled PT   Equipment Recommended (defer at this time)   PT - OK to Discharge Yes  (to rehab once medically stable)   Modified [place] Scale   Modified [place] Scale 4   Barthel Index   Feeding 10   Bathing 0   Grooming Score 5   Dressing Score 5   Bladder Score 5   Bowels Score 10   Toilet Use Score 5   Transfers (Bed/Chair) Score 5   Mobility (Level Surface) Score 0   Stairs Score 0   Barthel Index Score 45   History: co - morbidities, fall risk, cognition, 2 FOS to enter home  Exam: impairments in locomotion, musculoskeletal, balance,posture, joint integrity, skin integrity,  cognition; functional mobility, strength, functional endurance, Barthel 45, coordination, motor planning, sensation, and proprioception  Clinical: unstable/unpredictable; fall risk, cognition, obesity, assistx2, use of RW, pain  Complexity:high    At end of evaluation pt left sitting in bedside chair with all needs in reach, call bell and phone in hand and RN Heather aware of patient status and pt presentation during PT evaluation  No further questions or concerns for PT at this time    Toby Luciano, PT ,DPT

## 2018-06-06 NOTE — PROGRESS NOTES
NEPHROLOGY PROGRESS NOTE    Keven Rosenthal 56 y o  male MRN: [id number]  Unit/Bed#: E2 -01 Encounter: [id number]  Reason for Consult:  Acute renal failure and chronic kidney disease    The patient is awake and alert  He states that he feels relatively okay with some slight decrease in the swelling of his scrotum  He also reports when asked that he is having trouble urinating that he has been a little bit incontinent as well  ASSESSMENT/PLAN:  1  Renal    The patient is chronic kidney disease likely due to diabetic nephropathy with over 4 g of proteinuria  Creatinine appears to have a baseline around 1 8 and has increased today to 2 with hyperkalemia  His output is not recorded as high so I do not know if the diuretic at ineffective whether not he has an element of urinary obstruction  His symptoms of urinary incontinence could be related to an overflow state  Check bladder scan with postvoid residual  Monitor renal function  Will eventually need diuresis for edema    2  Hyperkalemia    This could be due to bladder outlet issues and increasing creatinine  Will give Kayexalate as ordered and repeat BMP  Await bladder scan with postvoid residual     3   Edema  The patient has scrotal edema and lower extremity edema  This could be related to nephrotic syndrome as he has hypoalbuminemia with an excellent appetite on heavy proteinuria  He is on treatment for potential scrotal cellulitis as well  SUBJECTIVE:  Review of Systems   Constitution: Negative for chills and fever  HENT: Negative  Eyes: Negative  Cardiovascular: Positive for leg swelling  Negative for chest pain and orthopnea  Respiratory: Negative  Negative for cough, hemoptysis and shortness of breath  Hematologic/Lymphatic: Negative  Gastrointestinal: Negative for bloating, abdominal pain, diarrhea and vomiting  Genitourinary: Positive for bladder incontinence  Negative for dysuria and hematuria     Neurological: Negative  OBJECTIVE:  Current Weight: Weight - Scale: 117 kg (257 lb 15 oz)  Vitals:Temp (24hrs), Av 6 °F (36 4 °C), Min:97 4 °F (36 3 °C), Max:97 9 °F (36 6 °C)  Current: Temperature: 97 6 °F (36 4 °C)   Blood pressure 151/77, pulse 93, temperature 97 6 °F (36 4 °C), temperature source Tympanic, resp  rate 18, height 5' 10" (1 778 m), weight 117 kg (257 lb 15 oz), SpO2 92 %  , Body mass index is 37 01 kg/m²  Intake/Output Summary (Last 24 hours) at 18 0908  Last data filed at 18 0520   Gross per 24 hour   Intake              150 ml   Output             1350 ml   Net            -1200 ml       Physical Exam: /77 (BP Location: Left arm)   Pulse 93   Temp 97 6 °F (36 4 °C) (Tympanic)   Resp 18   Ht 5' 10" (1 778 m)   Wt 117 kg (257 lb 15 oz)   SpO2 92%   BMI 37 01 kg/m²   Physical Exam   Constitutional: He is oriented to person, place, and time  No distress  HENT:   Mouth/Throat: No oropharyngeal exudate  Eyes: No scleral icterus  Neck: Neck supple  No JVD present  Cardiovascular: Normal rate and regular rhythm  Exam reveals no friction rub  Positive edema   Pulmonary/Chest: Effort normal and breath sounds normal  No respiratory distress  He has no wheezes  He has no rales  Abdominal: Soft  Bowel sounds are normal  He exhibits no distension  There is no tenderness  There is no rebound  Unable to definitively palpate bladder   Neurological: He is alert and oriented to person, place, and time         Medications:    Current Facility-Administered Medications:     acetaminophen (TYLENOL) tablet 650 mg, 650 mg, Oral, Q6H PRN, Jonna Raya PA-C, 650 mg at 18    amitriptyline (ELAVIL) tablet 75 mg, 75 mg, Oral, HS, Jonna Raya PA-C, 75 mg at 18    aspirin (ECOTRIN LOW STRENGTH) EC tablet 81 mg, 81 mg, Oral, Daily, Jonna Raya PA-C, 81 mg at 18 0801    atorvastatin (LIPITOR) tablet 40 mg, 40 mg, Oral, Daily With Dinner, Jennie Muse PA-C, 40 mg at 06/05/18 1712    carvedilol (COREG) tablet 6 25 mg, 6 25 mg, Oral, BID With Meals, Kd Oshea DO, 6 25 mg at 06/06/18 0758    ceFAZolin (ANCEF) IVPB (premix) 1,000 mg, 1,000 mg, Intravenous, Q8H, Peña Prince MD, Last Rate: 100 mL/hr at 06/06/18 0454, 1,000 mg at 06/06/18 0454    docusate sodium (COLACE) capsule 100 mg, 100 mg, Oral, BID, Jonna Raya PA-C, 100 mg at 06/06/18 0800    ferrous sulfate tablet 325 mg, 325 mg, Oral, BID With Meals, Peña Prince MD, 325 mg at 06/06/18 0758    fluticasone-salmeterol (ADVAIR) 250-50 mcg/dose inhaler 1 puff, 1 puff, Inhalation, Q12H [address], Jonna Raya PA-C, 1 puff at 06/06/18 0803    gabapentin (NEURONTIN) capsule 900 mg, 900 mg, Oral, TID, Jonna Raya PA-C, 900 mg at 06/06/18 0801    heparin (porcine) subcutaneous injection 5,000 Units, 5,000 Units, Subcutaneous, Q8H [address], 5,000 Units at 06/06/18 0501 **AND** [CANCELED] Platelet count, , , Once, Jonna Raya PA-C    insulin glargine (LANTUS) subcutaneous injection 20 Units 0 2 mL, 20 Units, Subcutaneous, HS, Kd Oshea DO, 20 Units at 06/05/18 2133    insulin lispro (HumaLOG) 100 units/mL subcutaneous injection 2-12 Units, 2-12 Units, Subcutaneous, 4x Daily (AC & HS), 2 Units at 06/05/18 2133 **AND** Fingerstick Glucose (POCT), , , 4x Daily AC and at bedtime, Elvis Raya PA-C    insulin regular (HumuLIN R,NovoLIN R) 10 Units in sodium chloride 0 9 % 3 mL IV syringe, , Intravenous, Once, Peña Prince MD    nicotine (NICODERM CQ) 14 mg/24hr TD 24 hr patch 1 patch, 1 patch, Transdermal, Daily, Jonna Raya PA-C, 1 patch at 06/06/18 0800    ondansetron (ZOFRAN) injection 4 mg, 4 mg, Intravenous, Q6H PRN, Jonna Raya PA-C    oxyCODONE (ROXICODONE) IR tablet 5 mg, 5 mg, Oral, Q4H PRN, Jonna Raya PA-C, 5 mg at 06/06/18 0501    polyethylene glycol (MIRALAX) packet 17 g, 17 g, Oral, Daily, Jonna LORD CHRIS Raya, 17 g at 06/06/18 0800    sodium polystyrene sulfonate (KAYEXALATE) oral suspension 15 g, 15 g, Oral, Once, Malcom Maynard MD    sodium polystyrene sulfonate (KAYEXALATE) oral suspension 30 g, 30 g, Oral, Once, Malcom Maynard MD    Laboratory Results:  Lab Results   Component Value Date    WBC 5 59 06/06/2018    HGB 8 0 (L) 06/06/2018    HCT 28 1 (L) 06/06/2018    MCV 97 06/06/2018     06/06/2018     Lab Results   Component Value Date    GLUCOSE 113 06/06/2018    CALCIUM 8 0 (L) 06/06/2018     06/06/2018    K 6 2 (H) 06/06/2018    CO2 28 06/06/2018     06/06/2018    BUN 45 (H) 06/06/2018    CREATININE 2 10 (H) 06/06/2018     Lab Results   Component Value Date    CALCIUM 8 0 (L) 06/06/2018     No results found for: LABPROT

## 2018-06-07 ENCOUNTER — APPOINTMENT (INPATIENT)
Dept: RADIOLOGY | Facility: HOSPITAL | Age: 58
DRG: 501 | End: 2018-06-07
Payer: COMMERCIAL

## 2018-06-07 LAB
ANION GAP SERPL CALCULATED.3IONS-SCNC: 7 MMOL/L (ref 4–13)
BASOPHILS # BLD AUTO: 0.01 THOUSANDS/ΜL (ref 0–0.1)
BASOPHILS NFR BLD AUTO: 0 % (ref 0–1)
BUN SERPL-MCNC: 45 MG/DL (ref 5–25)
CALCIUM SERPL-MCNC: 8.2 MG/DL (ref 8.3–10.1)
CHLORIDE SERPL-SCNC: 105 MMOL/L (ref 100–108)
CO2 SERPL-SCNC: 27 MMOL/L (ref 21–32)
CREAT SERPL-MCNC: 2.13 MG/DL (ref 0.6–1.3)
EOSINOPHIL # BLD AUTO: 0.1 THOUSAND/ΜL (ref 0–0.61)
EOSINOPHIL NFR BLD AUTO: 2 % (ref 0–6)
ERYTHROCYTE [DISTWIDTH] IN BLOOD BY AUTOMATED COUNT: 15.7 % (ref 11.6–15.1)
GFR SERPL CREATININE-BSD FRML MDRD: 34 ML/MIN/1.73SQ M
GLUCOSE SERPL-MCNC: 110 MG/DL (ref 65–140)
GLUCOSE SERPL-MCNC: 112 MG/DL (ref 65–140)
GLUCOSE SERPL-MCNC: 141 MG/DL (ref 65–140)
GLUCOSE SERPL-MCNC: 143 MG/DL (ref 65–140)
GLUCOSE SERPL-MCNC: 164 MG/DL (ref 65–140)
HCT VFR BLD AUTO: 26.3 % (ref 36.5–49.3)
HGB BLD-MCNC: 7.6 G/DL (ref 12–17)
LYMPHOCYTES # BLD AUTO: 1.31 THOUSANDS/ΜL (ref 0.6–4.47)
LYMPHOCYTES NFR BLD AUTO: 26 % (ref 14–44)
MCH RBC QN AUTO: 27.9 PG (ref 26.8–34.3)
MCHC RBC AUTO-ENTMCNC: 28.9 G/DL (ref 31.4–37.4)
MCV RBC AUTO: 97 FL (ref 82–98)
MONOCYTES # BLD AUTO: 0.53 THOUSAND/ΜL (ref 0.17–1.22)
MONOCYTES NFR BLD AUTO: 11 % (ref 4–12)
NEUTROPHILS # BLD AUTO: 3.12 THOUSANDS/ΜL (ref 1.85–7.62)
NEUTS SEG NFR BLD AUTO: 62 % (ref 43–75)
NRBC BLD AUTO-RTO: 0 /100 WBCS
PLATELET # BLD AUTO: 200 THOUSANDS/UL (ref 149–390)
PMV BLD AUTO: 9.8 FL (ref 8.9–12.7)
POTASSIUM SERPL-SCNC: 5.7 MMOL/L (ref 3.5–5.3)
RBC # BLD AUTO: 2.72 MILLION/UL (ref 3.88–5.62)
SODIUM SERPL-SCNC: 139 MMOL/L (ref 136–145)
WBC # BLD AUTO: 5.07 THOUSAND/UL (ref 4.31–10.16)

## 2018-06-07 PROCEDURE — 99233 SBSQ HOSP IP/OBS HIGH 50: CPT | Performed by: INTERNAL MEDICINE

## 2018-06-07 PROCEDURE — 71045 X-RAY EXAM CHEST 1 VIEW: CPT

## 2018-06-07 PROCEDURE — 82948 REAGENT STRIP/BLOOD GLUCOSE: CPT

## 2018-06-07 PROCEDURE — 85025 COMPLETE CBC W/AUTO DIFF WBC: CPT | Performed by: INTERNAL MEDICINE

## 2018-06-07 PROCEDURE — 80048 BASIC METABOLIC PNL TOTAL CA: CPT | Performed by: INTERNAL MEDICINE

## 2018-06-07 RX ORDER — SODIUM POLYSTYRENE SULFONATE 15 G/60ML
15 SUSPENSION ORAL; RECTAL ONCE
Status: COMPLETED | OUTPATIENT
Start: 2018-06-07 | End: 2018-06-07

## 2018-06-07 RX ORDER — TORSEMIDE 20 MG/1
20 TABLET ORAL DAILY
Status: DISCONTINUED | OUTPATIENT
Start: 2018-06-07 | End: 2018-06-09

## 2018-06-07 RX ORDER — TAMSULOSIN HYDROCHLORIDE 0.4 MG/1
0.4 CAPSULE ORAL
Status: DISCONTINUED | OUTPATIENT
Start: 2018-06-07 | End: 2018-06-16 | Stop reason: HOSPADM

## 2018-06-07 RX ORDER — MAGNESIUM CARB/ALUMINUM HYDROX 105-160MG
296 TABLET,CHEWABLE ORAL ONCE
Status: COMPLETED | OUTPATIENT
Start: 2018-06-07 | End: 2018-06-07

## 2018-06-07 RX ORDER — BISACODYL 10 MG
10 SUPPOSITORY, RECTAL RECTAL DAILY PRN
Status: DISCONTINUED | OUTPATIENT
Start: 2018-06-07 | End: 2018-06-16 | Stop reason: HOSPADM

## 2018-06-07 RX ADMIN — NICOTINE 1 PATCH: 14 PATCH TRANSDERMAL at 09:17

## 2018-06-07 RX ADMIN — IRON SUCROSE 200 MG: 20 INJECTION, SOLUTION INTRAVENOUS at 13:33

## 2018-06-07 RX ADMIN — CARVEDILOL 6.25 MG: 3.12 TABLET, FILM COATED ORAL at 18:04

## 2018-06-07 RX ADMIN — OXYCODONE HYDROCHLORIDE 5 MG: 5 TABLET ORAL at 20:11

## 2018-06-07 RX ADMIN — DESMOPRESSIN ACETATE 40 MG: 0.2 TABLET ORAL at 18:04

## 2018-06-07 RX ADMIN — CEFAZOLIN SODIUM 1000 MG: 1 SOLUTION INTRAVENOUS at 04:53

## 2018-06-07 RX ADMIN — HEPARIN SODIUM 5000 UNITS: 5000 INJECTION, SOLUTION INTRAVENOUS; SUBCUTANEOUS at 05:01

## 2018-06-07 RX ADMIN — HEPARIN SODIUM 5000 UNITS: 5000 INJECTION, SOLUTION INTRAVENOUS; SUBCUTANEOUS at 21:57

## 2018-06-07 RX ADMIN — CARVEDILOL 6.25 MG: 3.12 TABLET, FILM COATED ORAL at 09:17

## 2018-06-07 RX ADMIN — HEPARIN SODIUM 5000 UNITS: 5000 INJECTION, SOLUTION INTRAVENOUS; SUBCUTANEOUS at 13:33

## 2018-06-07 RX ADMIN — DOCUSATE SODIUM 100 MG: 100 CAPSULE, LIQUID FILLED ORAL at 18:04

## 2018-06-07 RX ADMIN — BISACODYL 10 MG: 10 SUPPOSITORY RECTAL at 19:42

## 2018-06-07 RX ADMIN — FERROUS SULFATE TAB 325 MG (65 MG ELEMENTAL FE) 325 MG: 325 (65 FE) TAB at 09:17

## 2018-06-07 RX ADMIN — GABAPENTIN 900 MG: 300 CAPSULE ORAL at 09:17

## 2018-06-07 RX ADMIN — INSULIN LISPRO 2 UNITS: 100 INJECTION, SOLUTION INTRAVENOUS; SUBCUTANEOUS at 12:24

## 2018-06-07 RX ADMIN — TAMSULOSIN HYDROCHLORIDE 0.4 MG: 0.4 CAPSULE ORAL at 18:04

## 2018-06-07 RX ADMIN — TORSEMIDE 20 MG: 20 TABLET ORAL at 11:03

## 2018-06-07 RX ADMIN — DOCUSATE SODIUM 100 MG: 100 CAPSULE, LIQUID FILLED ORAL at 09:17

## 2018-06-07 RX ADMIN — ASPIRIN 81 MG: 81 TABLET, COATED ORAL at 09:17

## 2018-06-07 RX ADMIN — CEFAZOLIN SODIUM 1000 MG: 1 SOLUTION INTRAVENOUS at 20:53

## 2018-06-07 RX ADMIN — AMITRIPTYLINE HYDROCHLORIDE 75 MG: 50 TABLET, FILM COATED ORAL at 21:58

## 2018-06-07 RX ADMIN — FLUTICASONE PROPIONATE AND SALMETEROL 1 PUFF: 50; 250 POWDER RESPIRATORY (INHALATION) at 09:20

## 2018-06-07 RX ADMIN — FLUTICASONE PROPIONATE AND SALMETEROL 1 PUFF: 50; 250 POWDER RESPIRATORY (INHALATION) at 21:57

## 2018-06-07 RX ADMIN — GABAPENTIN 900 MG: 300 CAPSULE ORAL at 21:57

## 2018-06-07 RX ADMIN — MAGESIUM CITRATE 296 ML: 1.75 LIQUID ORAL at 12:25

## 2018-06-07 RX ADMIN — CEFAZOLIN SODIUM 1000 MG: 1 SOLUTION INTRAVENOUS at 12:25

## 2018-06-07 RX ADMIN — SODIUM POLYSTYRENE SULFONATE 15 G: 15 SUSPENSION ORAL; RECTAL at 01:37

## 2018-06-07 RX ADMIN — FERROUS SULFATE TAB 325 MG (65 MG ELEMENTAL FE) 325 MG: 325 (65 FE) TAB at 18:04

## 2018-06-07 RX ADMIN — GABAPENTIN 900 MG: 300 CAPSULE ORAL at 18:04

## 2018-06-07 RX ADMIN — INSULIN GLARGINE 20 UNITS: 100 INJECTION, SOLUTION SUBCUTANEOUS at 21:58

## 2018-06-07 NOTE — PROGRESS NOTES
NEPHROLOGY PROGRESS NOTE    Alvin Chapa 56 y o  male MRN: [id number]  Unit/Bed#: E2 -01 Encounter: [id number]  Reason for Consult:  Chronic kidney disease    The patient is awake and alert sitting up eating  Still with some scrotal swelling with stated to little improved  Has mild postvoid residual but at times is incontinent of urine  No other acute changes or complaints  Last evening he got weak and fell to his knees but did not injure himself  ASSESSMENT/PLAN:  1  Renal    The patient has chronic kidney disease likely related to diabetic nephropathy with heavy proteinuria  He very well may have nephrotic syndrome as well  At this point his edema is significant and may be related to the proteinuria so will start torsemide 20 mg a day  Creatinine is above previous baseline but is relatively stable  Will need follow-up upon discharge  Torsemide 20 mg p o  q day  Monitor volume status and renal function    2  Hyperkalemia  This was treated acutely and has improved but still is elevated  I suspect the patient could have a type 4 RTA related to diabetic nephropathy  Going to reduce potassium in his diet and monitor on oral diuretic but if this is persistently a problem he may require potassium binder chronically  3   Urinary hesitancy    The patient has the sensation that he does not empty his bladder and he has a mild postvoid residual   I do not think this is severe enough to be causing renal dysfunction but I will start him on tamsulosin 0 4 mg a day  Monitor  4   Scrotal swelling  Multifactorial potentially due to anasarca as well as scrotal cellulitis and he is on antibiotics  SUBJECTIVE:  Review of Systems   Constitution: Negative for chills, decreased appetite and fever  HENT: Negative  Eyes: Negative  Cardiovascular: Positive for leg swelling  Negative for chest pain and orthopnea  Respiratory: Negative  Negative for cough and shortness of breath      Skin: Negative  Gastrointestinal: Negative  Genitourinary: Positive for bladder incontinence  Negative for dysuria and hematuria  Scrotal swelling   Neurological: Negative  OBJECTIVE:  Current Weight: Weight - Scale: 117 kg (257 lb 15 oz)  Vitals:Temp (24hrs), Av 8 °F (36 6 °C), Min:97 6 °F (36 4 °C), Max:98 °F (36 7 °C)  Current: Temperature: 98 °F (36 7 °C)   Blood pressure 153/92, pulse 98, temperature 98 °F (36 7 °C), temperature source Temporal, resp  rate 18, height 5' 10" (1 778 m), weight 117 kg (257 lb 15 oz), SpO2 94 %  , Body mass index is 37 01 kg/m²  Intake/Output Summary (Last 24 hours) at 18 0939  Last data filed at 18 0742   Gross per 24 hour   Intake              150 ml   Output             2329 ml   Net            -2179 ml       Physical Exam: /92 (BP Location: Right arm)   Pulse 98   Temp 98 °F (36 7 °C) (Temporal)   Resp 18   Ht 5' 10" (1 778 m)   Wt 117 kg (257 lb 15 oz)   SpO2 94%   BMI 37 01 kg/m²   Physical Exam   Constitutional: He is oriented to person, place, and time  No distress  HENT:   Mouth/Throat: No oropharyngeal exudate  Eyes: No scleral icterus  Neck: Normal range of motion  Neck supple  No JVD present  Cardiovascular: Normal rate and regular rhythm  Exam reveals no friction rub  Positive edema   Pulmonary/Chest: Effort normal and breath sounds normal  No respiratory distress  He has no wheezes  He has no rales  Abdominal: Soft  Bowel sounds are normal  He exhibits no distension  There is no tenderness  There is no rebound  Genitourinary:   Genitourinary Comments: Scrotal edema present   Neurological: He is alert and oriented to person, place, and time         Medications:    Current Facility-Administered Medications:     acetaminophen (TYLENOL) tablet 650 mg, 650 mg, Oral, Q6H PRN, Jonna Raya PA-C, 650 mg at 18    amitriptyline (ELAVIL) tablet 75 mg, 75 mg, Oral, HS, Jonna Raya PA-C, 75 mg at 06/06/18 2153    aspirin (ECOTRIN LOW STRENGTH) EC tablet 81 mg, 81 mg, Oral, Daily, Jonna Raya PA-C, 81 mg at 06/07/18 1506    atorvastatin (LIPITOR) tablet 40 mg, 40 mg, Oral, Daily With Adwoa Ainsley Raya PA-C, 40 mg at 06/06/18 1642    carvedilol (COREG) tablet 6 25 mg, 6 25 mg, Oral, BID With Meals, [facility], 6 25 mg at 06/07/18 0917    ceFAZolin (ANCEF) IVPB (premix) 1,000 mg, 1,000 mg, Intravenous, Q8H, Brice Jules MD, Last Rate: 100 mL/hr at 06/07/18 0453, 1,000 mg at 06/07/18 0453    docusate sodium (COLACE) capsule 100 mg, 100 mg, Oral, BID, Jonna Raya PA-C, 100 mg at 06/07/18 6756    ferrous sulfate tablet 325 mg, 325 mg, Oral, BID With Meals, Brice Jules MD, 325 mg at 06/07/18 0917    fluticasone-salmeterol (ADVAIR) 250-50 mcg/dose inhaler 1 puff, 1 puff, Inhalation, Q12H [address], Jonna Raya PA-C, 1 puff at 06/07/18 0920    gabapentin (NEURONTIN) capsule 900 mg, 900 mg, Oral, TID, Jonna Raya PA-C, 900 mg at 06/07/18 0917    heparin (porcine) subcutaneous injection 5,000 Units, 5,000 Units, Subcutaneous, Q8H [address], 5,000 Units at 06/07/18 0501 **AND** [CANCELED] Platelet count, , , Once, Jonna Raya PA-C    insulin glargine (LANTUS) subcutaneous injection 20 Units 0 2 mL, 20 Units, Subcutaneous, HS, [facility], , 20 Units at 06/06/18 2153    insulin lispro (HumaLOG) 100 units/mL subcutaneous injection 2-12 Units, 2-12 Units, Subcutaneous, 4x Daily (AC & HS), 2 Units at 06/05/18 2133 **AND** Fingerstick Glucose (POCT), , , 4x Daily AC and at bedtime, Jonna Raya PA-C    nicotine (NICODERM CQ) 14 mg/24hr TD 24 hr patch 1 patch, 1 patch, Transdermal, Daily, Jonna Raya PA-C, 1 patch at 06/07/18 0917    ondansetron (ZOFRAN) injection 4 mg, 4 mg, Intravenous, Q6H PRN, Jonna Raya PA-C    oxyCODONE (ROXICODONE) IR tablet 5 mg, 5 mg, Oral, Q4H PRN, Jonna Raya PA-C, 5 mg at 06/06/18 2021   polyethylene glycol (MIRALAX) packet 17 g, 17 g, Oral, Daily, Jonna Raya PA-C, Stopped at 06/07/18 0920    tamsulosin (FLOMAX) capsule 0 4 mg, 0 4 mg, Oral, Daily With Mike Schmitt MD    torsemide [facility]) tablet 20 mg, 20 mg, Oral, Daily, Aurora Damon MD    Laboratory Results:  Lab Results   Component Value Date    WBC 5 07 06/07/2018    HGB 7 6 (L) 06/07/2018    HCT 26 3 (L) 06/07/2018    MCV 97 06/07/2018     06/07/2018     Lab Results   Component Value Date    GLUCOSE 110 06/07/2018    CALCIUM 8 2 (L) 06/07/2018     06/07/2018    K 5 7 (H) 06/07/2018    CO2 27 06/07/2018     06/07/2018    BUN 45 (H) 06/07/2018    CREATININE 2 13 (H) 06/07/2018     Lab Results   Component Value Date    CALCIUM 8 2 (L) 06/07/2018     No results found for: LABPROT

## 2018-06-07 NOTE — PROGRESS NOTES
Pt's teley renewed  Updated Dr Frederick Kaur that pt still has not had a BM despite giving mag citrate earlier today  Suppository will be ordered  Pt still asymptomatic, denies any pain, N/V  Will continue to monitor

## 2018-06-07 NOTE — PLAN OF CARE
GENITOURINARY - ADULT     Maintains or returns to baseline urinary function Progressing     Absence of urinary retention Progressing     Urinary catheter remains patent Progressing        INFECTION - ADULT     Absence or prevention of progression during hospitalization Progressing     Absence of fever/infection during neutropenic period Progressing        PAIN - ADULT     Verbalizes/displays adequate comfort level or baseline comfort level Progressing        Potential for Falls     Patient will remain free of falls Progressing        Prexisting or High Potential for Compromised Skin Integrity     Skin integrity is maintained or improved Progressing        SAFETY ADULT     Patient will remain free of falls Progressing     Maintain or return to baseline ADL function Progressing     Maintain or return mobility status to optimal level Progressing

## 2018-06-07 NOTE — PROGRESS NOTES
[facility] Internal Medicine Progress Note  Patient: Zoila Hoover 56 y o  male   MRN: [id number]  PCP: Gregg Pritchard, DO  Unit/Bed#: E2 -01 Encounter: [id number]  Date Of Visit: 18  Assessment/Plan:  1  Scrotal edema- ?cellulitis, nephrotic syndrome   2  Diabetic Nephropathy   3  Nephrotic syndrome- likely 2/2 #2  4  MARK   5  COPD  6  Diastolic HF   7  Hyperkalemia   8  Constipation      -c/w abx  Trend fever curve and white count    -Echo with grade 2 diastolic dysfunction in addition to nephrotic syndrome   Cr elevated but stable  Hold ACEi  Monitor BMP  Appreciate nephro input  On torsemide  I/Os  Urinary retention protocol  Check CXR  -Given kayex, still without BM  Advance bowel regimen  -Iron supplementation, can give IV  Hgb low but stable  Unsure if had screening Cscope, will need to ask  Can add occult when has BM  If hasn't will need GI follow up     -PT, CM for home assistance     Subjective:   Still with volume overload  Is weak  Has FENG  Objective:     Vitals:   Temp (24hrs), Av 8 °F (36 6 °C), Min:97 6 °F (36 4 °C), Max:98 °F (36 7 °C)    HR:  [87-98] 98  Resp:  [18] 18  BP: (121-155)/(79-94) 153/92  SpO2:  [90 %-94 %] 94 %  Body mass index is 37 01 kg/m²  Input and Output Summary (last 24 hours):        Intake/Output Summary (Last 24 hours) at 18 1110  Last data filed at 18 0930   Gross per 24 hour   Intake              150 ml   Output             2554 ml   Net            -2404 ml       Physical Exam:     Physical Exam    GEN: NAD, obese  HEENT: PERRL  CARDIO: s1 s2 RRR  LUNGS: decreased   ABD: Soft, NT, edema   EXT: bilat edema      Additional Data:     Labs:      Results from last 7 days  Lab Units 18  0449   WBC Thousand/uL 5 07   HEMOGLOBIN g/dL 7 6*   HEMATOCRIT % 26 3*   PLATELETS Thousands/uL 200   NEUTROS PCT % 62   LYMPHS PCT % 26   MONOS PCT % 11   EOS PCT % 2       Results from last 7 days  Lab Units 18  0449  18  1846   SODIUM mmol/L 139  < > 142   POTASSIUM mmol/L 5 7*  < > 4 9   CHLORIDE mmol/L 105  < > 107   CO2 mmol/L 27  < > 26   BUN mg/dL 45*  < > 25   CREATININE mg/dL 2 13*  < > 1 95*   CALCIUM mg/dL 8 2*  < > 8 4   TOTAL PROTEIN g/dL  --   --  6 8   BILIRUBIN TOTAL mg/dL  --   --  0 19*   ALK PHOS U/L  --   --  151*   ALT U/L  --   --  16   AST U/L  --   --  15   GLUCOSE RANDOM mg/dL 110  < > 166*   < > = values in this interval not displayed  * I Have Reviewed All Lab Data Listed Above  * Additional Pertinent Lab Tests Reviewed: Kimi 66 Admission Reviewed    Imaging:    Imaging Reports Reviewed Today Include: Available     Recent Cultures (last 7 days):       Results from last 7 days  Lab Units 06/01/18  1916 06/01/18  1846   BLOOD CULTURE  No Growth After 5 Days  No Growth After 5 Days         Last 24 Hours Medication List:     Current Facility-Administered Medications:  acetaminophen 650 mg Oral Q6H PRN Jonna Raya PA-C    amitriptyline 75 mg Oral HS Jonna Raya PA-C    aspirin 81 mg Oral Daily Jonna Raya PA-C    atorvastatin 40 mg Oral Daily With Lynette Brothers CHRIS Raya    carvedilol 6 25 mg Oral BID With Meals [facility],     cefazolin 1,000 mg Intravenous Q8H Eloy Babb MD Last Rate: 1,000 mg (06/07/18 0453)   docusate sodium 100 mg Oral BID Jonna Raya PA-C    ferrous sulfate 325 mg Oral BID With Meals Eloy Babb MD    fluticasone-salmeterol 1 puff Inhalation Q12H [address] Jonna Raya PA-C    gabapentin 900 mg Oral TID Jonna Raya PA-C    heparin (porcine) 5,000 Units Subcutaneous [facility] Jonna Raya PA-C    insulin glargine 20 Units Subcutaneous HS [facility],     insulin lispro 2-12 Units Subcutaneous 4x Daily (AC & HS) Jonna Raya PA-C    magnesium citrate 296 mL Oral Once Eloy Babb MD    nicotine 1 patch Transdermal Daily Jonna Raya PA-C    ondansetron 4 mg Intravenous Q6H PRN Camille Baugh CHRIS Raya    oxyCODONE 5 mg Oral Q4H PRN Jonna Raya PA-C    polyethylene glycol 17 g Oral Daily Jonna Raya PA-C    tamsulosin 0 4 mg Oral Daily With Vahid Brar MD    torsemide 20 mg Oral Daily Walter Cameron MD         Today, Patient Was Seen By: Yvonne Covarrubias MD    ** Please Note: This note has been constructed using a voice recognition system   **

## 2018-06-08 LAB
ANION GAP SERPL CALCULATED.3IONS-SCNC: 6 MMOL/L (ref 4–13)
BASOPHILS # BLD AUTO: 0.01 THOUSANDS/ΜL (ref 0–0.1)
BASOPHILS NFR BLD AUTO: 0 % (ref 0–1)
BUN SERPL-MCNC: 43 MG/DL (ref 5–25)
CALCIUM SERPL-MCNC: 8.3 MG/DL (ref 8.3–10.1)
CHLORIDE SERPL-SCNC: 104 MMOL/L (ref 100–108)
CO2 SERPL-SCNC: 28 MMOL/L (ref 21–32)
CREAT SERPL-MCNC: 2.01 MG/DL (ref 0.6–1.3)
EOSINOPHIL # BLD AUTO: 0.05 THOUSAND/ΜL (ref 0–0.61)
EOSINOPHIL NFR BLD AUTO: 1 % (ref 0–6)
ERYTHROCYTE [DISTWIDTH] IN BLOOD BY AUTOMATED COUNT: 15.5 % (ref 11.6–15.1)
GFR SERPL CREATININE-BSD FRML MDRD: 36 ML/MIN/1.73SQ M
GLUCOSE SERPL-MCNC: 104 MG/DL (ref 65–140)
GLUCOSE SERPL-MCNC: 106 MG/DL (ref 65–140)
GLUCOSE SERPL-MCNC: 140 MG/DL (ref 65–140)
GLUCOSE SERPL-MCNC: 69 MG/DL (ref 65–140)
GLUCOSE SERPL-MCNC: 93 MG/DL (ref 65–140)
HCT VFR BLD AUTO: 26.9 % (ref 36.5–49.3)
HEMOCCULT STL QL: NEGATIVE
HGB BLD-MCNC: 7.8 G/DL (ref 12–17)
LYMPHOCYTES # BLD AUTO: 0.83 THOUSANDS/ΜL (ref 0.6–4.47)
LYMPHOCYTES NFR BLD AUTO: 13 % (ref 14–44)
MCH RBC QN AUTO: 27.9 PG (ref 26.8–34.3)
MCHC RBC AUTO-ENTMCNC: 29 G/DL (ref 31.4–37.4)
MCV RBC AUTO: 96 FL (ref 82–98)
MONOCYTES # BLD AUTO: 0.94 THOUSAND/ΜL (ref 0.17–1.22)
MONOCYTES NFR BLD AUTO: 15 % (ref 4–12)
NEUTROPHILS # BLD AUTO: 4.55 THOUSANDS/ΜL (ref 1.85–7.62)
NEUTS SEG NFR BLD AUTO: 71 % (ref 43–75)
NRBC BLD AUTO-RTO: 0 /100 WBCS
PLATELET # BLD AUTO: 203 THOUSANDS/UL (ref 149–390)
PMV BLD AUTO: 10.3 FL (ref 8.9–12.7)
POTASSIUM SERPL-SCNC: 5.3 MMOL/L (ref 3.5–5.3)
RBC # BLD AUTO: 2.8 MILLION/UL (ref 3.88–5.62)
SODIUM SERPL-SCNC: 138 MMOL/L (ref 136–145)
WBC # BLD AUTO: 6.38 THOUSAND/UL (ref 4.31–10.16)

## 2018-06-08 PROCEDURE — 82272 OCCULT BLD FECES 1-3 TESTS: CPT | Performed by: INTERNAL MEDICINE

## 2018-06-08 PROCEDURE — 82948 REAGENT STRIP/BLOOD GLUCOSE: CPT

## 2018-06-08 PROCEDURE — 85025 COMPLETE CBC W/AUTO DIFF WBC: CPT | Performed by: INTERNAL MEDICINE

## 2018-06-08 PROCEDURE — 80048 BASIC METABOLIC PNL TOTAL CA: CPT | Performed by: INTERNAL MEDICINE

## 2018-06-08 PROCEDURE — 99233 SBSQ HOSP IP/OBS HIGH 50: CPT | Performed by: INTERNAL MEDICINE

## 2018-06-08 RX ADMIN — HEPARIN SODIUM 5000 UNITS: 5000 INJECTION, SOLUTION INTRAVENOUS; SUBCUTANEOUS at 05:19

## 2018-06-08 RX ADMIN — POLYETHYLENE GLYCOL 3350 17 G: 17 POWDER, FOR SOLUTION ORAL at 09:31

## 2018-06-08 RX ADMIN — GABAPENTIN 900 MG: 300 CAPSULE ORAL at 09:32

## 2018-06-08 RX ADMIN — ASPIRIN 81 MG: 81 TABLET, COATED ORAL at 09:31

## 2018-06-08 RX ADMIN — CEFAZOLIN SODIUM 1000 MG: 1 SOLUTION INTRAVENOUS at 04:30

## 2018-06-08 RX ADMIN — IRON SUCROSE 200 MG: 20 INJECTION, SOLUTION INTRAVENOUS at 12:07

## 2018-06-08 RX ADMIN — TORSEMIDE 20 MG: 20 TABLET ORAL at 09:32

## 2018-06-08 RX ADMIN — HEPARIN SODIUM 5000 UNITS: 5000 INJECTION, SOLUTION INTRAVENOUS; SUBCUTANEOUS at 13:35

## 2018-06-08 RX ADMIN — FERROUS SULFATE TAB 325 MG (65 MG ELEMENTAL FE) 325 MG: 325 (65 FE) TAB at 17:54

## 2018-06-08 RX ADMIN — AMITRIPTYLINE HYDROCHLORIDE 75 MG: 50 TABLET, FILM COATED ORAL at 21:24

## 2018-06-08 RX ADMIN — FLUTICASONE PROPIONATE AND SALMETEROL 1 PUFF: 50; 250 POWDER RESPIRATORY (INHALATION) at 21:24

## 2018-06-08 RX ADMIN — CEFAZOLIN SODIUM 1000 MG: 1 SOLUTION INTRAVENOUS at 20:17

## 2018-06-08 RX ADMIN — OXYCODONE HYDROCHLORIDE 5 MG: 5 TABLET ORAL at 05:34

## 2018-06-08 RX ADMIN — DESMOPRESSIN ACETATE 40 MG: 0.2 TABLET ORAL at 17:54

## 2018-06-08 RX ADMIN — GABAPENTIN 900 MG: 300 CAPSULE ORAL at 17:54

## 2018-06-08 RX ADMIN — HEPARIN SODIUM 5000 UNITS: 5000 INJECTION, SOLUTION INTRAVENOUS; SUBCUTANEOUS at 21:24

## 2018-06-08 RX ADMIN — FLUTICASONE PROPIONATE AND SALMETEROL 1 PUFF: 50; 250 POWDER RESPIRATORY (INHALATION) at 09:32

## 2018-06-08 RX ADMIN — INSULIN GLARGINE 20 UNITS: 100 INJECTION, SOLUTION SUBCUTANEOUS at 21:24

## 2018-06-08 RX ADMIN — CEFAZOLIN SODIUM 1000 MG: 1 SOLUTION INTRAVENOUS at 11:03

## 2018-06-08 RX ADMIN — DOCUSATE SODIUM 100 MG: 100 CAPSULE, LIQUID FILLED ORAL at 17:54

## 2018-06-08 RX ADMIN — DOCUSATE SODIUM 100 MG: 100 CAPSULE, LIQUID FILLED ORAL at 09:31

## 2018-06-08 RX ADMIN — TAMSULOSIN HYDROCHLORIDE 0.4 MG: 0.4 CAPSULE ORAL at 17:55

## 2018-06-08 RX ADMIN — CARVEDILOL 6.25 MG: 3.12 TABLET, FILM COATED ORAL at 17:54

## 2018-06-08 RX ADMIN — GABAPENTIN 900 MG: 300 CAPSULE ORAL at 21:24

## 2018-06-08 RX ADMIN — FERROUS SULFATE TAB 325 MG (65 MG ELEMENTAL FE) 325 MG: 325 (65 FE) TAB at 09:31

## 2018-06-08 RX ADMIN — NICOTINE 1 PATCH: 14 PATCH TRANSDERMAL at 09:44

## 2018-06-08 RX ADMIN — CARVEDILOL 6.25 MG: 3.12 TABLET, FILM COATED ORAL at 09:32

## 2018-06-08 RX ADMIN — OXYCODONE HYDROCHLORIDE 5 MG: 5 TABLET ORAL at 20:16

## 2018-06-08 NOTE — PLAN OF CARE
GENITOURINARY - ADULT     Maintains or returns to baseline urinary function Progressing     Absence of urinary retention Progressing        INFECTION - ADULT     Absence or prevention of progression during hospitalization Progressing     Absence of fever/infection during neutropenic period Progressing        PAIN - ADULT     Verbalizes/displays adequate comfort level or baseline comfort level Progressing        Potential for Falls     Patient will remain free of falls Progressing        Prexisting or High Potential for Compromised Skin Integrity     Skin integrity is maintained or improved Progressing        SAFETY ADULT     Patient will remain free of falls Progressing     Maintain or return to baseline ADL function Progressing     Maintain or return mobility status to optimal level Progressing

## 2018-06-08 NOTE — PROGRESS NOTES
NEPHROLOGY PROGRESS NOTE    Sarika Paul 56 y o  male MRN: [id number]  Unit/Bed#: E2 -01 Encounter: [id number]  Reason for Consult:  Acute on chronic kidney disease    Patient is lying in bed  Still with some scrotal swelling but improved and still little tender  Other than that he is eating well  Has not really walked around much but no acute changes  ASSESSMENT/PLAN:  1  Renal    Patient's chronic kidney disease with proteinuria and likely has nephrotic syndrome  I suspect it is due to diabetic nephropathy  The patient has not followed up with Nephrology and will need that upon discharge  Creatinine is stable around 2  He has diuresed and is in a negative balance  Continue torsemide at current dose for now  Monitor volumes status and renal function  Holding on ACE-inhibitor or ARB at the present time but long-term once stable will likely need that  2   Hyperkalemia  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  Potassium level is under control monitor on diuretic  3   Scrotal edema  Was on treatment for cellulitis swelling may be related to nephrotic syndrome  On diuretic continue Urology recommendation of scrotal left  SUBJECTIVE:  Review of Systems   Constitution: Negative for chills and fever  HENT: Negative  Eyes: Negative  Cardiovascular: Positive for leg swelling  Negative for chest pain and orthopnea  Respiratory: Negative for cough and shortness of breath  Gastrointestinal: Negative  Negative for bloating, abdominal pain, diarrhea and vomiting  Genitourinary: Positive for genital sores  Negative for dysuria and hematuria          Scrotal swelling improving slowly       OBJECTIVE:  Current Weight: Weight - Scale: (!) 141 kg (311 lb 1 1 oz)  Vitals:Temp (24hrs), Av 7 °F (36 5 °C), Min:97 4 °F (36 3 °C), Max:98 1 °F (36 7 °C)  Current: Temperature: 97 6 °F (36 4 °C)   Blood pressure 146/81, pulse 91, temperature 97 6 °F (36 4 °C), temperature source Tympanic, resp  rate 18, height 5' 10" (1 778 m), weight (!) 141 kg (311 lb 1 1 oz), SpO2 90 %  , Body mass index is 44 63 kg/m²  Intake/Output Summary (Last 24 hours) at 06/08/18 1023  Last data filed at 06/08/18 0504   Gross per 24 hour   Intake             1000 ml   Output             1475 ml   Net             -475 ml       Physical Exam: /81 (BP Location: Left arm)   Pulse 91   Temp 97 6 °F (36 4 °C) (Tympanic)   Resp 18   Ht 5' 10" (1 778 m)   Wt (!) 141 kg (311 lb 1 1 oz)   SpO2 90%   BMI 44 63 kg/m²   Physical Exam   Constitutional: He is oriented to person, place, and time  No distress  HENT:   Mouth/Throat: No oropharyngeal exudate  Eyes: No scleral icterus  Neck: Neck supple  No JVD present  Cardiovascular: Normal rate and regular rhythm  Exam reveals no friction rub  Positive edema   Pulmonary/Chest: Effort normal and breath sounds normal  No respiratory distress  He has no wheezes  He has no rales  Abdominal: Soft  Bowel sounds are normal  He exhibits no distension  There is no tenderness  There is no rebound  Genitourinary:   Genitourinary Comments: Scrotal edema and erythema   Neurological: He is alert and oriented to person, place, and time         Medications:    Current Facility-Administered Medications:     acetaminophen (TYLENOL) tablet 650 mg, 650 mg, Oral, Q6H PRN, Jonna Raya PA-C, 650 mg at 06/04/18 1936    amitriptyline (ELAVIL) tablet 75 mg, 75 mg, Oral, HS, Jonna Raya PA-C, 75 mg at 06/07/18 2158    aspirin (ECOTRIN LOW STRENGTH) EC tablet 81 mg, 81 mg, Oral, Daily, Jonna Raya PA-C, 81 mg at 06/08/18 0931    atorvastatin (LIPITOR) tablet 40 mg, 40 mg, Oral, Daily With Mariana Raya PA-C, 40 mg at 06/07/18 1804    bisacodyl (DULCOLAX) rectal suppository 10 mg, 10 mg, Rectal, Daily PRN, Donavan Burnett MD, 10 mg at 06/07/18 1942    carvedilol (COREG) tablet 6 25 mg, 6 25 mg, Oral, BID With Meals, Heike Simple, DO, 6 25 mg at 06/08/18 0932    ceFAZolin (ANCEF) IVPB (premix) 1,000 mg, 1,000 mg, Intravenous, Q8H, Cira Stevens MD, Last Rate: 100 mL/hr at 06/08/18 0430, 1,000 mg at 06/08/18 0430    docusate sodium (COLACE) capsule 100 mg, 100 mg, Oral, BID, Jonna Raya PA-C, 100 mg at 06/08/18 2438    ferrous sulfate tablet 325 mg, 325 mg, Oral, BID With Meals, Cira Stevens MD, 325 mg at 06/08/18 0931    fluticasone-salmeterol (ADVAIR) 250-50 mcg/dose inhaler 1 puff, 1 puff, Inhalation, Q12H [address], Jonna Raya PA-C, 1 puff at 06/08/18 0932    gabapentin (NEURONTIN) capsule 900 mg, 900 mg, Oral, TID, Jonna Raya PA-C, 900 mg at 06/08/18 0932    heparin (porcine) subcutaneous injection 5,000 Units, 5,000 Units, Subcutaneous, Q8H [address], 5,000 Units at 06/08/18 0519 **AND** [CANCELED] Platelet count, , , Once, Jonna Raya PA-C    insulin glargine (LANTUS) subcutaneous injection 20 Units 0 2 mL, 20 Units, Subcutaneous, HS, Heike Simple, DO, 20 Units at 06/07/18 2158    insulin lispro (HumaLOG) 100 units/mL subcutaneous injection 2-12 Units, 2-12 Units, Subcutaneous, 4x Daily (AC & HS), 2 Units at 06/07/18 1224 **AND** Fingerstick Glucose (POCT), , , 4x Daily AC and at bedtime, Jonna Raya PA-C    nicotine (NICODERM CQ) 14 mg/24hr TD 24 hr patch 1 patch, 1 patch, Transdermal, Daily, Jonna Raya PA-C, 1 patch at 06/08/18 0944    ondansetron (ZOFRAN) injection 4 mg, 4 mg, Intravenous, Q6H PRN, Jonna Raya PA-C    oxyCODONE (ROXICODONE) IR tablet 5 mg, 5 mg, Oral, Q4H PRN, Jonna Raya PA-C, 5 mg at 06/08/18 0534    polyethylene glycol (MIRALAX) packet 17 g, 17 g, Oral, Daily, Jonna Raya PA-C, 17 g at 06/08/18 0931    tamsulosin (FLOMAX) capsule 0 4 mg, 0 4 mg, Oral, Daily With Leonardo Covarrubias MD, 0 4 mg at 06/07/18 1804    torsemide (DEMADEX) tablet 20 mg, 20 mg, Oral, Daily, Alexi Nation MD, 20 mg at 06/08/18 0932    Laboratory Results:  Lab Results   Component Value Date    WBC 6 38 06/08/2018    HGB 7 8 (L) 06/08/2018    HCT 26 9 (L) 06/08/2018    MCV 96 06/08/2018     06/08/2018     Lab Results   Component Value Date    GLUCOSE 104 06/08/2018    CALCIUM 8 3 06/08/2018     06/08/2018    K 5 3 06/08/2018    CO2 28 06/08/2018     06/08/2018    BUN 43 (H) 06/08/2018    CREATININE 2 01 (H) 06/08/2018     Lab Results   Component Value Date    CALCIUM 8 3 06/08/2018     No results found for: LABPROT

## 2018-06-08 NOTE — SOCIAL WORK
Patient is recommended for STR; CM met with patient at bedside to discuss recommendation; patient is unsure if he wants STR  CM provided patient with SNF list, patient informed CM he will look over SNF list and consider recommendation  CM following

## 2018-06-08 NOTE — PROGRESS NOTES
[facility] Progress Note  Patient: Cristopher Velasquez 56 y o  male   MRN: [id number]  PCP: Arelis Bhardwaj, DO  Unit/Bed#: E2 -01 Encounter: [id number]  Date Of Visit: 18    Assessment/Plan:  1  Scrotal edema- ?cellulitis, nephrotic syndrome   2  Diabetic Nephropathy   3  Nephrotic syndrome- likely 2/2 #2  4  MARK   5  COPD  6  Diastolic HF   7  Hyperkalemia   8  Constipation-resolved    9  CXR density      -c/w abx  Trend fever curve and white count    -Echo with grade 2 diastolic dysfunction in addition to nephrotic syndrome   Diuresed pretty well with torsemide  Needs further diuresis  c/w diuretic  Monitor I/Os, weights  Nephro following  -CXR will need to be repeated after diuresing   -Potassium improved  -c/w iron, occult negative  -PT, CM for home assistance     Subjective:   Doing ok  Breathing is same  Still swollen  Doesn't give much in terms of history  Objective:     Vitals:   Temp (24hrs), Av 7 °F (36 5 °C), Min:97 4 °F (36 3 °C), Max:98 1 °F (36 7 °C)    HR:  [89-93] 91  Resp:  [18] 18  BP: (146-169)/(78-81) 146/81  SpO2:  [90 %-92 %] 90 %  Body mass index is 44 63 kg/m²  Input and Output Summary (last 24 hours):        Intake/Output Summary (Last 24 hours) at 18 1033  Last data filed at 18 0504   Gross per 24 hour   Intake             1000 ml   Output             1475 ml   Net             -475 ml       Physical Exam:     Physical Exam       GEN: NAD, obese  HEENT: PERRL  CARDIO: s1 s2 RRR  LUNGS: decreased   ABD: Soft, NT, edema   EXT: bilat edema    Additional Data:     Labs:      Results from last 7 days  Lab Units 18  0457   WBC Thousand/uL 6 38   HEMOGLOBIN g/dL 7 8*   HEMATOCRIT % 26 9*   PLATELETS Thousands/uL 203   NEUTROS PCT % 71   LYMPHS PCT % 13*   MONOS PCT % 15*   EOS PCT % 1       Results from last 7 days  Lab Units 18  0457  18  1846   SODIUM mmol/L 138  < > 142   POTASSIUM mmol/L 5 3  < > 4 9   CHLORIDE mmol/L 104  < > 107   CO2 mmol/L 28  < > 26   BUN mg/dL 43*  < > 25   CREATININE mg/dL 2 01*  < > 1 95*   CALCIUM mg/dL 8 3  < > 8 4   TOTAL PROTEIN g/dL  --   --  6 8   BILIRUBIN TOTAL mg/dL  --   --  0 19*   ALK PHOS U/L  --   --  151*   ALT U/L  --   --  16   AST U/L  --   --  15   GLUCOSE RANDOM mg/dL 104  < > 166*   < > = values in this interval not displayed  * I Have Reviewed All Lab Data Listed Above  * Additional Pertinent Lab Tests Reviewed: [facility] Admission Reviewed    Imaging:    Imaging Reports Reviewed Today Include: all available     Recent Cultures (last 7 days):       Results from last 7 days  Lab Units 06/01/18  1916 06/01/18  1846   BLOOD CULTURE  No Growth After 5 Days  No Growth After 5 Days         Last 24 Hours Medication List:     Current Facility-Administered Medications:  acetaminophen 650 mg Oral Q6H PRN Jonna Raya PA-C    amitriptyline 75 mg Oral HS Jonna Raya PA-C    aspirin 81 mg Oral Daily Jonna Raya PA-C    atorvastatin 40 mg Oral Daily With Big Chantell Raya PA-C    bisacodyl 10 mg Rectal Daily PRN Yvonne Covarrubias MD    carvedilol 6 25 mg Oral BID With Meals Andrea Bennett DO    cefazolin 1,000 mg Intravenous Q8H Yvonne Covarrubias MD Last Rate: 1,000 mg (06/08/18 0430)   docusate sodium 100 mg Oral BID Jonna Raya PA-C    ferrous sulfate 325 mg Oral BID With Meals Yvonne Covarrubias MD    fluticasone-salmeterol 1 puff Inhalation Q12H [address] Jonna Raya PA-C    gabapentin 900 mg Oral TID Jonna Raya PA-C    heparin (porcine) 5,000 Units Subcutaneous [facility] Jonna Raya PA-C    insulin glargine 20 Units Subcutaneous HS Andrea Bennett DO    insulin lispro 2-12 Units Subcutaneous 4x Daily (AC & HS) Jonna Raya PA-C    nicotine 1 patch Transdermal Daily Jonna M Delabre, PA-C    ondansetron 4 mg Intravenous Q6H PRN Jonna Raya PA-C    oxyCODONE 5 mg Oral Q4H PRN Sourav Tierney PA-C polyethylene glycol 17 g Oral Daily Jonna Raya PA-C    tamsulosin 0 4 mg Oral Daily With Klaus Bedolla MD    torsemide 20 mg Oral Daily Rony Smith MD         Today, Patient Was Seen By: Ho White MD    ** Please Note: This note has been constructed using a voice recognition system   **

## 2018-06-09 LAB
ANION GAP SERPL CALCULATED.3IONS-SCNC: 6 MMOL/L (ref 4–13)
BASOPHILS # BLD AUTO: 0.01 THOUSANDS/ΜL (ref 0–0.1)
BASOPHILS NFR BLD AUTO: 0 % (ref 0–1)
BUN SERPL-MCNC: 46 MG/DL (ref 5–25)
CALCIUM SERPL-MCNC: 8.8 MG/DL (ref 8.3–10.1)
CHLORIDE SERPL-SCNC: 104 MMOL/L (ref 100–108)
CHOLEST SERPL-MCNC: 133 MG/DL (ref 50–200)
CO2 SERPL-SCNC: 27 MMOL/L (ref 21–32)
CREAT SERPL-MCNC: 2.02 MG/DL (ref 0.6–1.3)
EOSINOPHIL # BLD AUTO: 0.09 THOUSAND/ΜL (ref 0–0.61)
EOSINOPHIL NFR BLD AUTO: 2 % (ref 0–6)
ERYTHROCYTE [DISTWIDTH] IN BLOOD BY AUTOMATED COUNT: 15.6 % (ref 11.6–15.1)
GFR SERPL CREATININE-BSD FRML MDRD: 36 ML/MIN/1.73SQ M
GLUCOSE SERPL-MCNC: 100 MG/DL (ref 65–140)
GLUCOSE SERPL-MCNC: 134 MG/DL (ref 65–140)
GLUCOSE SERPL-MCNC: 161 MG/DL (ref 65–140)
GLUCOSE SERPL-MCNC: 186 MG/DL (ref 65–140)
GLUCOSE SERPL-MCNC: 42 MG/DL (ref 65–140)
GLUCOSE SERPL-MCNC: 50 MG/DL (ref 65–140)
GLUCOSE SERPL-MCNC: 58 MG/DL (ref 65–140)
GLUCOSE SERPL-MCNC: 73 MG/DL (ref 65–140)
HCT VFR BLD AUTO: 25.8 % (ref 36.5–49.3)
HDLC SERPL-MCNC: 54 MG/DL (ref 40–60)
HGB BLD-MCNC: 7.5 G/DL (ref 12–17)
LDLC SERPL CALC-MCNC: 69 MG/DL (ref 0–100)
LYMPHOCYTES # BLD AUTO: 1.26 THOUSANDS/ΜL (ref 0.6–4.47)
LYMPHOCYTES NFR BLD AUTO: 21 % (ref 14–44)
MCH RBC QN AUTO: 27.8 PG (ref 26.8–34.3)
MCHC RBC AUTO-ENTMCNC: 29.1 G/DL (ref 31.4–37.4)
MCV RBC AUTO: 96 FL (ref 82–98)
MONOCYTES # BLD AUTO: 0.73 THOUSAND/ΜL (ref 0.17–1.22)
MONOCYTES NFR BLD AUTO: 12 % (ref 4–12)
NEUTROPHILS # BLD AUTO: 3.85 THOUSANDS/ΜL (ref 1.85–7.62)
NEUTS SEG NFR BLD AUTO: 65 % (ref 43–75)
NRBC BLD AUTO-RTO: 0 /100 WBCS
PLATELET # BLD AUTO: 191 THOUSANDS/UL (ref 149–390)
PMV BLD AUTO: 10 FL (ref 8.9–12.7)
POTASSIUM SERPL-SCNC: 4.9 MMOL/L (ref 3.5–5.3)
RBC # BLD AUTO: 2.7 MILLION/UL (ref 3.88–5.62)
SODIUM SERPL-SCNC: 137 MMOL/L (ref 136–145)
TRIGL SERPL-MCNC: 49 MG/DL
WBC # BLD AUTO: 5.94 THOUSAND/UL (ref 4.31–10.16)

## 2018-06-09 PROCEDURE — 85025 COMPLETE CBC W/AUTO DIFF WBC: CPT | Performed by: INTERNAL MEDICINE

## 2018-06-09 PROCEDURE — 99232 SBSQ HOSP IP/OBS MODERATE 35: CPT | Performed by: INTERNAL MEDICINE

## 2018-06-09 PROCEDURE — 99233 SBSQ HOSP IP/OBS HIGH 50: CPT | Performed by: INTERNAL MEDICINE

## 2018-06-09 PROCEDURE — 80061 LIPID PANEL: CPT | Performed by: NURSE PRACTITIONER

## 2018-06-09 PROCEDURE — 80048 BASIC METABOLIC PNL TOTAL CA: CPT | Performed by: INTERNAL MEDICINE

## 2018-06-09 PROCEDURE — 84166 PROTEIN E-PHORESIS/URINE/CSF: CPT | Performed by: NURSE PRACTITIONER

## 2018-06-09 PROCEDURE — 82948 REAGENT STRIP/BLOOD GLUCOSE: CPT

## 2018-06-09 RX ORDER — FUROSEMIDE 10 MG/ML
40 INJECTION INTRAMUSCULAR; INTRAVENOUS
Status: DISCONTINUED | OUTPATIENT
Start: 2018-06-09 | End: 2018-06-09

## 2018-06-09 RX ORDER — INSULIN GLARGINE 100 [IU]/ML
10 INJECTION, SOLUTION SUBCUTANEOUS
Status: DISCONTINUED | OUTPATIENT
Start: 2018-06-09 | End: 2018-06-16 | Stop reason: HOSPADM

## 2018-06-09 RX ORDER — FUROSEMIDE 10 MG/ML
80 INJECTION INTRAMUSCULAR; INTRAVENOUS ONCE
Status: COMPLETED | OUTPATIENT
Start: 2018-06-09 | End: 2018-06-09

## 2018-06-09 RX ORDER — DEXTROSE MONOHYDRATE 25 G/50ML
25 INJECTION, SOLUTION INTRAVENOUS ONCE
Status: DISCONTINUED | OUTPATIENT
Start: 2018-06-09 | End: 2018-06-14

## 2018-06-09 RX ORDER — FUROSEMIDE 10 MG/ML
20 SYRINGE (ML) INJECTION CONTINUOUS
Status: DISCONTINUED | OUTPATIENT
Start: 2018-06-09 | End: 2018-06-12

## 2018-06-09 RX ADMIN — CEFAZOLIN SODIUM 1000 MG: 1 SOLUTION INTRAVENOUS at 21:35

## 2018-06-09 RX ADMIN — CEFAZOLIN SODIUM 1000 MG: 1 SOLUTION INTRAVENOUS at 13:47

## 2018-06-09 RX ADMIN — HEPARIN SODIUM 5000 UNITS: 5000 INJECTION, SOLUTION INTRAVENOUS; SUBCUTANEOUS at 06:16

## 2018-06-09 RX ADMIN — DOCUSATE SODIUM 100 MG: 100 CAPSULE, LIQUID FILLED ORAL at 18:00

## 2018-06-09 RX ADMIN — FLUTICASONE PROPIONATE AND SALMETEROL 1 PUFF: 50; 250 POWDER RESPIRATORY (INHALATION) at 08:39

## 2018-06-09 RX ADMIN — OXYCODONE HYDROCHLORIDE 5 MG: 5 TABLET ORAL at 13:49

## 2018-06-09 RX ADMIN — FUROSEMIDE 80 MG: 10 INJECTION, SOLUTION INTRAMUSCULAR; INTRAVENOUS at 18:57

## 2018-06-09 RX ADMIN — POLYETHYLENE GLYCOL 3350 17 G: 17 POWDER, FOR SOLUTION ORAL at 08:39

## 2018-06-09 RX ADMIN — GABAPENTIN 900 MG: 300 CAPSULE ORAL at 21:35

## 2018-06-09 RX ADMIN — GABAPENTIN 900 MG: 300 CAPSULE ORAL at 18:00

## 2018-06-09 RX ADMIN — CEFAZOLIN SODIUM 1000 MG: 1 SOLUTION INTRAVENOUS at 03:34

## 2018-06-09 RX ADMIN — INSULIN LISPRO 2 UNITS: 100 INJECTION, SOLUTION INTRAVENOUS; SUBCUTANEOUS at 18:01

## 2018-06-09 RX ADMIN — FERROUS SULFATE TAB 325 MG (65 MG ELEMENTAL FE) 325 MG: 325 (65 FE) TAB at 06:30

## 2018-06-09 RX ADMIN — FLUTICASONE PROPIONATE AND SALMETEROL 1 PUFF: 50; 250 POWDER RESPIRATORY (INHALATION) at 21:36

## 2018-06-09 RX ADMIN — TAMSULOSIN HYDROCHLORIDE 0.4 MG: 0.4 CAPSULE ORAL at 18:01

## 2018-06-09 RX ADMIN — FERROUS SULFATE TAB 325 MG (65 MG ELEMENTAL FE) 325 MG: 325 (65 FE) TAB at 18:00

## 2018-06-09 RX ADMIN — ASPIRIN 81 MG: 81 TABLET, COATED ORAL at 08:38

## 2018-06-09 RX ADMIN — INSULIN GLARGINE 10 UNITS: 100 INJECTION, SOLUTION SUBCUTANEOUS at 21:35

## 2018-06-09 RX ADMIN — OXYCODONE HYDROCHLORIDE 5 MG: 5 TABLET ORAL at 08:41

## 2018-06-09 RX ADMIN — HEPARIN SODIUM 5000 UNITS: 5000 INJECTION, SOLUTION INTRAVENOUS; SUBCUTANEOUS at 15:08

## 2018-06-09 RX ADMIN — CARVEDILOL 6.25 MG: 3.12 TABLET, FILM COATED ORAL at 06:30

## 2018-06-09 RX ADMIN — GABAPENTIN 900 MG: 300 CAPSULE ORAL at 08:38

## 2018-06-09 RX ADMIN — DESMOPRESSIN ACETATE 40 MG: 0.2 TABLET ORAL at 18:00

## 2018-06-09 RX ADMIN — HEPARIN SODIUM 5000 UNITS: 5000 INJECTION, SOLUTION INTRAVENOUS; SUBCUTANEOUS at 21:34

## 2018-06-09 RX ADMIN — DOCUSATE SODIUM 100 MG: 100 CAPSULE, LIQUID FILLED ORAL at 08:38

## 2018-06-09 RX ADMIN — OXYCODONE HYDROCHLORIDE 5 MG: 5 TABLET ORAL at 19:07

## 2018-06-09 RX ADMIN — NICOTINE 1 PATCH: 14 PATCH TRANSDERMAL at 08:40

## 2018-06-09 RX ADMIN — CARVEDILOL 6.25 MG: 3.12 TABLET, FILM COATED ORAL at 18:00

## 2018-06-09 RX ADMIN — Medication 20 MG/HR: at 18:58

## 2018-06-09 RX ADMIN — FUROSEMIDE 40 MG: 10 INJECTION, SOLUTION INTRAMUSCULAR; INTRAVENOUS at 08:39

## 2018-06-09 NOTE — PROGRESS NOTES
[facility] Internal Medicine Progress Note  Patient: Mortimer Sheer 56 y o  male   MRN: [id number]  PCP: Marita Cruz DO  Unit/Bed#: E2 -01 Encounter: [id number]  Date Of Visit: 18    Assessment/Plan:  1  Scrotal edema- ?cellulitis, likely more related to nephrotic syndrome   2  Diabetic Nephropathy   3  Nephrotic syndrome- likely 2/2 #2  4  MARK   5  COPD  6  Diastolic HF   7  Hyperkalemia   8  Constipation-resolved    9  CXR density   10  Acute hypoxic resp failure      -Ancef day 6  Afebrile  Trend fever curve and white count    -Echo with grade 2 diastolic dysfunction in addition to nephrotic syndrome  I/Os are inaccurate  Pt states can't always make it to use the urinal  Is very volume overloaded, weight is up  Has vascular congestion leading to hypoxia requiring O2  IV lasix BID, I/Os as best we can, daily weights  O2 as needed to maintain sat >91%  Nephro following  -CXR will need to be repeated after diuresing   -c/w iron, occult negative  -Low BG this am, lantus decreased by half   -PT, CM for home assistance     Subjective:   Has some SOB  Had low BG this am   Overall has poor insight to his condition     Objective:     Vitals:   Temp (24hrs), Av 6 °F (36 4 °C), Min:97 1 °F (36 2 °C), Max:97 9 °F (36 6 °C)    HR:  [87-93] 88  Resp:  [18] 18  BP: (137-147)/(73-84) 137/73  SpO2:  [90 %-91 %] 90 %  Body mass index is 44 98 kg/m²  Input and Output Summary (last 24 hours):        Intake/Output Summary (Last 24 hours) at 18 1006  Last data filed at 18 0424   Gross per 24 hour   Intake              100 ml   Output              575 ml   Net             -475 ml       Physical Exam:     Physical Exam    GEN: NAD, obese   HEENT: PERRL  CARDIO: s1 s2 RRR  LUNGS: crackles   ABD: Soft, edematous   EXT: LE pitting edema       Additional Data:     Labs:      Results from last 7 days  Lab Units 18  0635   WBC Thousand/uL 5 94   HEMOGLOBIN g/dL 7 5*   HEMATOCRIT % 25 8*   PLATELETS Thousands/uL 191   NEUTROS PCT % 65   LYMPHS PCT % 21   MONOS PCT % 12   EOS PCT % 2       Results from last 7 days  Lab Units 06/09/18  0635   SODIUM mmol/L 137   POTASSIUM mmol/L 4 9   CHLORIDE mmol/L 104   CO2 mmol/L 27   BUN mg/dL 46*   CREATININE mg/dL 2 02*   CALCIUM mg/dL 8 8   GLUCOSE RANDOM mg/dL 50*           * I Have Reviewed All Lab Data Listed Above  * Additional Pertinent Lab Tests Reviewed: All Labs For Current Hospital Admission Reviewed    Imaging:    Imaging Reports Reviewed Today Include:  All available     Recent Cultures (last 7 days):           Last 24 Hours Medication List:     Current Facility-Administered Medications:  acetaminophen 650 mg Oral Q6H PRN Jonna Raya PA-C    amitriptyline 75 mg Oral HS Jonna Raya PA-C    aspirin 81 mg Oral Daily Jonna Raya PA-C    atorvastatin 40 mg Oral Daily With Big Chantell Raya PA-C    bisacodyl 10 mg Rectal Daily PRN Deny Aguilar MD    carvedilol 6 25 mg Oral BID With Meals Braxton Griamldo DO    cefazolin 1,000 mg Intravenous Q8H Deny Aguilar MD Last Rate: Stopped (06/09/18 0424)   dextrose 25 mL Intravenous Once Deny Aguilar MD    docusate sodium 100 mg Oral BID Jonna Raya PA-C    ferrous sulfate 325 mg Oral BID With Meals Deny Aguilar MD    fluticasone-salmeterol 1 puff Inhalation Q12H [address] Madelyn Dominic Essex, PA-C    furosemide 40 mg Intravenous BID (diuretic) Deny Aguilar MD    gabapentin 900 mg Oral TID Jonna Raya PA-C    heparin (porcine) 5,000 Units Subcutaneous Q8H [address] Jonna Raya PA-C    insulin glargine 10 Units Subcutaneous HS Deny Aguilar MD    insulin lispro 2-12 Units Subcutaneous 4x Daily (AC & HS) Jonna Raya PA-C    nicotine 1 patch Transdermal Daily Jonna Raya PA-C    ondansetron 4 mg Intravenous Q6H PRN Jonna Raya PA-C    oxyCODONE 5 mg Oral Q4H PRN Jonna Raya PA-C    polyethylene glycol 17 g Oral Daily Michael Leal PA-C    tamsulosin 0 4 mg Oral Daily With Miles Craig MD         Today, Patient Was Seen By: Mak Zhang MD    ** Please Note: This note has been constructed using a voice recognition system   **

## 2018-06-09 NOTE — PROGRESS NOTES
Obtained new IV  Pt blood sugar improved to 186  Pt reports some dizziness still, but feels it has improved  Will continue to monitor

## 2018-06-09 NOTE — PROGRESS NOTES
Pt's blood sugar 42 this am  Reports some weakness, but otherwise asymptomatic  Pt was given 4oz of oj per hypoglycemia protocol  Recheck blood sugar was 58  Pt remains asymptomatic  Pt now eating breakfast  Will continue to monitor

## 2018-06-09 NOTE — PROGRESS NOTES
Pt reporting SOB with rest and exertion  o2 sats in the 80s on RA  Placed pt on 2L NC sats at 95%  Pt still reporting dizziness, blood sugar now 73  Pt states this is low for him  Informed Dr Johnathan Ng  Will give half amp of dextrose if dizziness persists  Pt resting with call bell in reach at this time  Will continue to monitor

## 2018-06-09 NOTE — PROGRESS NOTES
[address] 56 y o  male MRN: [id number]  Unit/Bed#: E2 -01 Encounter: [id number]  Reason for Consult: DILCIA on CKD    ASSESSMENT and PLAN:  1  DILCIA:  Baseline creatinine unknown  Creatinine 1 95 on admission, reached a sagrario of 1 7 after which creatinine increased in the setting of diuresis  Creatinine plateauing near 2   · Last labs were 1 year ago and at that time creatinine was 1  14    · Urinalysis 1 year ago showed significant protein  · Progression of disease likely in the setting of significant proteinuria/poorly controlled diabetes mellitus   · Renal ultrasound:  Right kidney 12 7 cm, left kidney was not identified due to bowel gas artifact  No right hydronephrosis  Right kidney normal echogenicity and contour  2  Chronic kidney disease:  Patient does not follow with a nephrologist  · Encourage follow-up at discharge  3  Proteinuria, ? Nephrotic syndrome:    · Urine protein creatinine ratio 4 46 g  · Albumin level 2 5 on admission  · Significant edema  · No evidence of hyperlipidemia:  Cholesterol 133, triglycerides 49, HDL 54, LDL calculated 69  · Likely due to diabetes  Membranous, FSGS is also in the differential (the acute onset favors FSGS where as a slow more indolent presentation favors a secondary cause such as diabetes- unfortunately little historical data  · Workup in light of proteinuria, DILCIA and anemia: Check SPEP, UPEP light chains, hepatitis studies  4  Scrotal edema/cellulitis:  Urology following  · Ultrasound of the testicles shows no underlying pathology  5  Chronic CHF, volume overload:  Echo shows grade 2 diastolic dysfunction  · Chest x-ray shows mild pulmonary vascular congestion  · Significant edema/anasarca  Diuretic resistence  · Currently on Lasix 40 mg IV BID which is insufficient   Pt has been here >1 week with no improvement in volume status  · Weight increasing  · Recommend Bolus 80 mg followed by Lasix infusion  · Fluid restriction  6  Anemia:  Stool for occult blood negative  · Status post Venofer  · Fe sat 10 %  · On oral iron  7  Hypertension:  On carvedilol 6 25 mg twice a day, torsemide 20 mg daily- BP acceptable  8  Diabetes mellitus:  Poorly controlled  9  COPD    SUMMARY OF RECOMMENDATIONS:  · Workup for secondary causes  · Fluid restriction  · Recommend lasix infusion  · Check renal function panel in the AM    SUBJECTIVE / INTERVAL HISTORY:  Bloated, feels swollen all over for approximately one month  Not feeling any better since admission  OBJECTIVE:  Current Weight: Weight - Scale: (!) 142 kg (313 lb 7 9 oz)  Vitals:    06/08/18 2349 06/09/18 0600 06/09/18 0621 06/09/18 0735   BP: 144/76  147/84 137/73   BP Location: Right arm  Left arm Right arm   Pulse: 87  93 88   Resp: 18   18   Temp: 97 8 °F (36 6 °C)   (!) 97 1 °F (36 2 °C)   TempSrc: Temporal   Temporal   SpO2: 91%   90%   Weight:  (!) 142 kg (313 lb 7 9 oz)     Height:           Intake/Output Summary (Last 24 hours) at 06/09/18 1243  Last data filed at 06/09/18 0424   Gross per 24 hour   Intake              100 ml   Output              575 ml   Net             -475 ml     General: NAD  Skin: warm and dry, no rash  Eyes: sclera clear  ENT: MMM  Neck: supple  Chest: crackles bilaterally lower lobes  CVS: RRR, No M/R/G  Abdomen: large, firm, distended  Extremities: pitting edema  : scrotal edema  Neuro:  A+O x3  Psych: Appropriate  Medications:    Current Facility-Administered Medications:     acetaminophen (TYLENOL) tablet 650 mg, 650 mg, Oral, Q6H PRN, Jonna Raya PA-C, 650 mg at 06/04/18 1936    amitriptyline (ELAVIL) tablet 75 mg, 75 mg, Oral, HS, Jonna Raya PA-C, 75 mg at 06/08/18 2124    aspirin (ECOTRIN LOW STRENGTH) EC tablet 81 mg, 81 mg, Oral, Daily, Jonna Raya PA-C, 81 mg at 06/09/18 0838    atorvastatin (LIPITOR) tablet 40 mg, 40 mg, Oral, Daily With Lynettealysia Raya PA-C, 40 mg at 06/08/18 6088    bisacodyl (DULCOLAX) rectal suppository 10 mg, 10 mg, Rectal, Daily PRN, Argelia Freeman MD, 10 mg at 06/07/18 1942    carvedilol (COREG) tablet 6 25 mg, 6 25 mg, Oral, BID With Meals, [place], 6 25 mg at 06/09/18 0630    ceFAZolin (ANCEF) IVPB (premix) 1,000 mg, 1,000 mg, Intravenous, Q8H, Argelia Freeman MD, Stopped at 06/09/18 0424    dextrose 50 % IV solution 25 mL, 25 mL, Intravenous, Once, Argelia Freeman MD, Stopped at 06/09/18 1149    docusate sodium (COLACE) capsule 100 mg, 100 mg, Oral, BID, Jonna Raya PA-C, 100 mg at 06/09/18 9757    ferrous sulfate tablet 325 mg, 325 mg, Oral, BID With Meals, Argelia Freeman MD, 325 mg at 06/09/18 0630    fluticasone-salmeterol (ADVAIR) 250-50 mcg/dose inhaler 1 puff, 1 puff, Inhalation, Q12H [address], Jonna Raya PA-C, 1 puff at 06/09/18 0839    furosemide (LASIX) injection 40 mg, 40 mg, Intravenous, BID (diuretic), Argelia Freeman MD, 40 mg at 06/09/18 0839    gabapentin (NEURONTIN) capsule 900 mg, 900 mg, Oral, TID, Jonna Raya PA-C, 900 mg at 06/09/18 0838    heparin (porcine) subcutaneous injection 5,000 Units, 5,000 Units, Subcutaneous, Q8H [address], 5,000 Units at 06/09/18 0616 **AND** [CANCELED] Platelet count, , , Once, Jonna Raya PA-C    insulin glargine (LANTUS) subcutaneous injection 10 Units 0 1 mL, 10 Units, Subcutaneous, HS, Argelia Freeman MD    insulin lispro (HumaLOG) 100 units/mL subcutaneous injection 2-12 Units, 2-12 Units, Subcutaneous, 4x Daily (AC & HS), 2 Units at 06/07/18 1224 **AND** Fingerstick Glucose (POCT), , , 4x Daily AC and at bedtime, Jonna Raya PA-C    nicotine (NICODERM CQ) 14 mg/24hr TD 24 hr patch 1 patch, 1 patch, Transdermal, Daily, Jonna Raya PA-C, 1 patch at 06/09/18 0840    ondansetron (ZOFRAN) injection 4 mg, 4 mg, Intravenous, Q6H PRN, Jonna Raya PA-C    oxyCODONE (ROXICODONE) IR tablet 5 mg, 5 mg, Oral, Q4H PRN, Jonna Raya PA-C, 5 mg at 06/09/18 0841    polyethylene glycol (MIRALAX) packet 17 g, 17 g, Oral, Daily, Jonna Raya PA-C, 17 g at 06/09/18 0839    tamsulosin [facility]) capsule 0 4 mg, 0 4 mg, Oral, Daily With Nirmal Bah MD, 0 4 mg at 06/08/18 1755    Laboratory Results:    Results from last 7 days  Lab Units 06/09/18  0635 06/08/18  0457 06/07/18  0449 06/06/18  2040 06/06/18  1406 06/06/18  0451 06/05/18  0451 06/04/18  0511 06/03/18  0502   WBC Thousand/uL 5 94 6 38 5 07  --   --  5 59 4 92 4 46 5 23   HEMOGLOBIN g/dL 7 5* 7 8* 7 6*  --   --  8 0* 7 9* 8 0* 7 7*   HEMATOCRIT % 25 8* 26 9* 26 3*  --   --  28 1* 27 6* 27 7* 26 7*   PLATELETS Thousands/uL 191 203 200  --   --  207 206 189 193   SODIUM mmol/L 137 138 139 140 139 141 143 143 145   POTASSIUM mmol/L 4 9 5 3 5 7* 5 9* 6 0* 6 2* 5 2 5 0 5 1   CHLORIDE mmol/L 104 104 105 106 106 106 108 109* 112*   CO2 mmol/L 27 28 27 26 29 28 28 28 26   BUN mg/dL 46* 43* 45* 46* 45* 45* 38* 32* 29*   CREATININE mg/dL 2 02* 2 01* 2 13* 2 14* 2 11* 2 10* 1 79* 1 81* 1 69*   CALCIUM mg/dL 8 8 8 3 8 2* 7 9* 8 1* 8 0* 7 9* 8 2* 8 0*   GLUCOSE RANDOM mg/dL 50* 104 110 78 84 113 67 103 61*     Previous work up:

## 2018-06-10 LAB
ALBUMIN SERPL BCP-MCNC: 2.4 G/DL (ref 3.5–5)
ANION GAP SERPL CALCULATED.3IONS-SCNC: 7 MMOL/L (ref 4–13)
BASOPHILS # BLD AUTO: 0.02 THOUSANDS/ΜL (ref 0–0.1)
BASOPHILS NFR BLD AUTO: 0 % (ref 0–1)
BUN SERPL-MCNC: 48 MG/DL (ref 5–25)
CALCIUM SERPL-MCNC: 8.4 MG/DL (ref 8.3–10.1)
CHLORIDE SERPL-SCNC: 101 MMOL/L (ref 100–108)
CO2 SERPL-SCNC: 28 MMOL/L (ref 21–32)
CREAT SERPL-MCNC: 2.32 MG/DL (ref 0.6–1.3)
EOSINOPHIL # BLD AUTO: 0.08 THOUSAND/ΜL (ref 0–0.61)
EOSINOPHIL NFR BLD AUTO: 2 % (ref 0–6)
ERYTHROCYTE [DISTWIDTH] IN BLOOD BY AUTOMATED COUNT: 15.7 % (ref 11.6–15.1)
GFR SERPL CREATININE-BSD FRML MDRD: 30 ML/MIN/1.73SQ M
GLUCOSE SERPL-MCNC: 129 MG/DL (ref 65–140)
GLUCOSE SERPL-MCNC: 142 MG/DL (ref 65–140)
GLUCOSE SERPL-MCNC: 146 MG/DL (ref 65–140)
GLUCOSE SERPL-MCNC: 155 MG/DL (ref 65–140)
GLUCOSE SERPL-MCNC: 163 MG/DL (ref 65–140)
HAV IGM SER QL: NORMAL
HBV CORE IGM SER QL: NORMAL
HBV SURFACE AG SER QL: NORMAL
HCT VFR BLD AUTO: 26 % (ref 36.5–49.3)
HCV AB SER QL: NORMAL
HGB BLD-MCNC: 7.4 G/DL (ref 12–17)
LYMPHOCYTES # BLD AUTO: 1.05 THOUSANDS/ΜL (ref 0.6–4.47)
LYMPHOCYTES NFR BLD AUTO: 19 % (ref 14–44)
MCH RBC QN AUTO: 27.5 PG (ref 26.8–34.3)
MCHC RBC AUTO-ENTMCNC: 28.5 G/DL (ref 31.4–37.4)
MCV RBC AUTO: 97 FL (ref 82–98)
MONOCYTES # BLD AUTO: 0.61 THOUSAND/ΜL (ref 0.17–1.22)
MONOCYTES NFR BLD AUTO: 11 % (ref 4–12)
NEUTROPHILS # BLD AUTO: 3.68 THOUSANDS/ΜL (ref 1.85–7.62)
NEUTS SEG NFR BLD AUTO: 68 % (ref 43–75)
NRBC BLD AUTO-RTO: 0 /100 WBCS
PHOSPHATE SERPL-MCNC: 5.7 MG/DL (ref 2.7–4.5)
PLATELET # BLD AUTO: 216 THOUSANDS/UL (ref 149–390)
PMV BLD AUTO: 10.4 FL (ref 8.9–12.7)
POTASSIUM SERPL-SCNC: 5.3 MMOL/L (ref 3.5–5.3)
RBC # BLD AUTO: 2.69 MILLION/UL (ref 3.88–5.62)
SODIUM SERPL-SCNC: 136 MMOL/L (ref 136–145)
WBC # BLD AUTO: 5.44 THOUSAND/UL (ref 4.31–10.16)

## 2018-06-10 PROCEDURE — 83883 ASSAY NEPHELOMETRY NOT SPEC: CPT | Performed by: NURSE PRACTITIONER

## 2018-06-10 PROCEDURE — 85025 COMPLETE CBC W/AUTO DIFF WBC: CPT | Performed by: INTERNAL MEDICINE

## 2018-06-10 PROCEDURE — 82948 REAGENT STRIP/BLOOD GLUCOSE: CPT

## 2018-06-10 PROCEDURE — 99232 SBSQ HOSP IP/OBS MODERATE 35: CPT | Performed by: INTERNAL MEDICINE

## 2018-06-10 PROCEDURE — 84165 PROTEIN E-PHORESIS SERUM: CPT | Performed by: NURSE PRACTITIONER

## 2018-06-10 PROCEDURE — 99233 SBSQ HOSP IP/OBS HIGH 50: CPT | Performed by: INTERNAL MEDICINE

## 2018-06-10 PROCEDURE — 80069 RENAL FUNCTION PANEL: CPT | Performed by: NURSE PRACTITIONER

## 2018-06-10 PROCEDURE — 80074 ACUTE HEPATITIS PANEL: CPT | Performed by: NURSE PRACTITIONER

## 2018-06-10 RX ORDER — METOLAZONE 5 MG/1
5 TABLET ORAL DAILY
Status: DISCONTINUED | OUTPATIENT
Start: 2018-06-10 | End: 2018-06-10

## 2018-06-10 RX ADMIN — HEPARIN SODIUM 5000 UNITS: 5000 INJECTION, SOLUTION INTRAVENOUS; SUBCUTANEOUS at 06:10

## 2018-06-10 RX ADMIN — HEPARIN SODIUM 5000 UNITS: 5000 INJECTION, SOLUTION INTRAVENOUS; SUBCUTANEOUS at 14:55

## 2018-06-10 RX ADMIN — TAMSULOSIN HYDROCHLORIDE 0.4 MG: 0.4 CAPSULE ORAL at 16:21

## 2018-06-10 RX ADMIN — DOCUSATE SODIUM 100 MG: 100 CAPSULE, LIQUID FILLED ORAL at 08:08

## 2018-06-10 RX ADMIN — FERROUS SULFATE TAB 325 MG (65 MG ELEMENTAL FE) 325 MG: 325 (65 FE) TAB at 08:16

## 2018-06-10 RX ADMIN — INSULIN LISPRO 2 UNITS: 100 INJECTION, SOLUTION INTRAVENOUS; SUBCUTANEOUS at 21:59

## 2018-06-10 RX ADMIN — GABAPENTIN 900 MG: 300 CAPSULE ORAL at 16:21

## 2018-06-10 RX ADMIN — DESMOPRESSIN ACETATE 40 MG: 0.2 TABLET ORAL at 16:24

## 2018-06-10 RX ADMIN — OXYCODONE HYDROCHLORIDE 5 MG: 5 TABLET ORAL at 06:10

## 2018-06-10 RX ADMIN — HEPARIN SODIUM 5000 UNITS: 5000 INJECTION, SOLUTION INTRAVENOUS; SUBCUTANEOUS at 21:57

## 2018-06-10 RX ADMIN — POLYETHYLENE GLYCOL 3350 17 G: 17 POWDER, FOR SOLUTION ORAL at 08:08

## 2018-06-10 RX ADMIN — INSULIN GLARGINE 10 UNITS: 100 INJECTION, SOLUTION SUBCUTANEOUS at 21:59

## 2018-06-10 RX ADMIN — CEFAZOLIN SODIUM 1000 MG: 1 SOLUTION INTRAVENOUS at 20:38

## 2018-06-10 RX ADMIN — INSULIN LISPRO 2 UNITS: 100 INJECTION, SOLUTION INTRAVENOUS; SUBCUTANEOUS at 12:29

## 2018-06-10 RX ADMIN — OXYCODONE HYDROCHLORIDE 5 MG: 5 TABLET ORAL at 00:18

## 2018-06-10 RX ADMIN — Medication 20 MG/HR: at 06:09

## 2018-06-10 RX ADMIN — CARVEDILOL 6.25 MG: 3.12 TABLET, FILM COATED ORAL at 08:08

## 2018-06-10 RX ADMIN — FLUTICASONE PROPIONATE AND SALMETEROL 1 PUFF: 50; 250 POWDER RESPIRATORY (INHALATION) at 08:16

## 2018-06-10 RX ADMIN — GABAPENTIN 900 MG: 300 CAPSULE ORAL at 21:58

## 2018-06-10 RX ADMIN — AMITRIPTYLINE HYDROCHLORIDE 75 MG: 50 TABLET, FILM COATED ORAL at 21:58

## 2018-06-10 RX ADMIN — GABAPENTIN 900 MG: 300 CAPSULE ORAL at 08:08

## 2018-06-10 RX ADMIN — DOCUSATE SODIUM 100 MG: 100 CAPSULE, LIQUID FILLED ORAL at 19:05

## 2018-06-10 RX ADMIN — FERROUS SULFATE TAB 325 MG (65 MG ELEMENTAL FE) 325 MG: 325 (65 FE) TAB at 16:21

## 2018-06-10 RX ADMIN — CEFAZOLIN SODIUM 1000 MG: 1 SOLUTION INTRAVENOUS at 12:34

## 2018-06-10 RX ADMIN — OXYCODONE HYDROCHLORIDE 5 MG: 5 TABLET ORAL at 21:57

## 2018-06-10 RX ADMIN — AMITRIPTYLINE HYDROCHLORIDE 75 MG: 50 TABLET, FILM COATED ORAL at 00:13

## 2018-06-10 RX ADMIN — NICOTINE 1 PATCH: 14 PATCH TRANSDERMAL at 08:17

## 2018-06-10 RX ADMIN — CEFAZOLIN SODIUM 1000 MG: 1 SOLUTION INTRAVENOUS at 04:38

## 2018-06-10 RX ADMIN — FLUTICASONE PROPIONATE AND SALMETEROL 1 PUFF: 50; 250 POWDER RESPIRATORY (INHALATION) at 21:59

## 2018-06-10 RX ADMIN — METOLAZONE 5 MG: 5 TABLET ORAL at 16:20

## 2018-06-10 RX ADMIN — ASPIRIN 81 MG: 81 TABLET, COATED ORAL at 08:08

## 2018-06-10 RX ADMIN — CARVEDILOL 6.25 MG: 3.12 TABLET, FILM COATED ORAL at 16:21

## 2018-06-10 NOTE — PROGRESS NOTES
[address] 56 y o  male MRN: [id number]  Unit/Bed#: E2 -01 Encounter: [id number]  Reason for Consult: DILCIA, CKD    ASSESSMENT and PLAN:  1  DILCIA:  Baseline creatinine unknown  Creatinine 1 95 on admission, reached a sagrario of 1 7 after which creatinine increased in the setting of diuresis  Creatinine plateauing near 2   · Last labs were 1 year ago and at that time creatinine was 1  14    · Urinalysis 1 year ago showed significant protein  · Progression of disease likely in the setting of significant proteinuria/poorly controlled diabetes mellitus   · Renal ultrasound:  Right kidney 12 7 cm, left kidney was not identified due to bowel gas artifact  No right hydronephrosis  Right kidney normal echogenicity and contour  2  Chronic kidney disease:  Patient does not follow with a nephrologist  · Encourage follow-up at discharge  3  Proteinuria, ? Nephrotic syndrome:    · Urine protein creatinine ratio 4 46 g  · Albumin level 2 4  · Significant edema/ Diuretic resistance in the setting of nephrotic snydrome  · No evidence of hyperlipidemia:  Cholesterol 133, triglycerides 49, HDL 54, LDL calculated 69  · Likely due to diabetes  Membranous, FSGS is also in the differential (the acute onset favors FSGS where as a slow more indolent presentation favors a secondary cause such as diabetes- unfortunately little historical data  · Hepatitis studies nonreactive  · Workup: Check SPEP, UPEP light chains  4  Scrotal edema/cellulitis:  Urology following  · Ultrasound of the testicles shows no underlying pathology  5  Chronic CHF, volume overload:  Echo shows grade 2 diastolic dysfunction  · Chest x-ray 6/7 shows mild pulmonary vascular congestion  · Placed on Lasix infusion yesterday- urine output ~2 L  · Modest response so far  Weight has declined 2 lb  · Metolazone 5 mg daily  · Fluid restriction  6   Anemia:  Stool for occult blood negative  · Status post Venofer  · Fe sat 10 %  · On oral iron  7  Hypertension:  On carvedilol 6 25 mg twice a day, torsemide 20 mg daily- BP acceptable  8  Diabetes mellitus:  Poorly controlled  9  COPD  10  CKD MBD:  Phosphorus elevated 5 7     SUMMARY OF RECOMMENDATIONS:  · Phosphorus restricted diet  · Metolazone 5 mg daily   · Await results of workup- SPEP, UPEP, free light chains     SUBJECTIVE / INTERVAL HISTORY:  States he is urinating more but has not noticed any improvement in swelling  OBJECTIVE:  Current Weight: Weight - Scale: (!) 141 kg (309 lb 15 5 oz)  Vitals:    06/10/18 0020 06/10/18 0600 06/10/18 0704 06/10/18 0718   BP:    136/82   BP Location:    Left arm   Pulse:    103   Resp:    18   Temp:    98 3 °F (36 8 °C)   TempSrc:    Temporal   SpO2: 92%   90%   Weight:  (!) 141 kg (309 lb 15 5 oz) (!) 141 kg (309 lb 15 5 oz)    Height:           Intake/Output Summary (Last 24 hours) at 06/10/18 1523  Last data filed at 06/10/18 1457   Gross per 24 hour   Intake           1509 8 ml   Output             2639 ml   Net          -1129 2 ml     General: NAD  Sitting out of bed in the chair  Skin: warm and dry, no rash  Eyes: sclera clear, conjunctiva pale  ENT: MMM  Neck: supple, no JVD appreciated  Chest: crackles bilaterally lower lobes  CVS: RRR, No M/R/G  Abdomen: large, firm, distended  Extremities: pitting edema  Upper extremity edema  : scrotal edema  Neuro:  A+O x3  Psych: Appropriate  Medications:    Current Facility-Administered Medications:     acetaminophen (TYLENOL) tablet 650 mg, 650 mg, Oral, Q6H PRN, Jonna Raya PA-C, 650 mg at 06/04/18 1936    amitriptyline (ELAVIL) tablet 75 mg, 75 mg, Oral, HS, Jonna Raya PA-C, 75 mg at 06/10/18 0013    aspirin (ECOTRIN LOW STRENGTH) EC tablet 81 mg, 81 mg, Oral, Daily, Jonna Raya PA-C, 81 mg at 06/10/18 0808    atorvastatin (LIPITOR) tablet 40 mg, 40 mg, Oral, Daily With Agustín Raya PA-C, 40 mg at 06/09/18 1800    bisacodyl (DULCOLAX) rectal suppository 10 mg, 10 mg, Rectal, Daily PRN, Johnson Lind MD, 10 mg at 06/07/18 1942    carvedilol (COREG) tablet 6 25 mg, 6 25 mg, Oral, BID With Meals, Natasha Flores DO, 6 25 mg at 06/10/18 0808    ceFAZolin (ANCEF) IVPB (premix) 1,000 mg, 1,000 mg, Intravenous, Q8H, Johnson Lind MD, Stopped at 06/10/18 1304    dextrose 50 % IV solution 25 mL, 25 mL, Intravenous, Once, Johnson Lind MD, Stopped at 06/09/18 1149    docusate sodium (COLACE) capsule 100 mg, 100 mg, Oral, BID, Jonna Raya PA-C, 100 mg at 06/10/18 0808    ferrous sulfate tablet 325 mg, 325 mg, Oral, BID With Meals, Johnson Lind MD, 325 mg at 06/10/18 0816    fluticasone-salmeterol (ADVAIR) 250-50 mcg/dose inhaler 1 puff, 1 puff, Inhalation, Q12H [facility], Jonna Raya PA-C, 1 puff at 06/10/18 0816    furosemide (LASIX) 500 mg infusion 50 mL, 20 mg/hr, Intravenous, Continuous, Bruce Rivera MD, Last Rate: 2 mL/hr at 06/10/18 0609, 20 mg/hr at 06/10/18 0609    gabapentin (NEURONTIN) capsule 900 mg, 900 mg, Oral, TID, Jonna Raya PA-C, 900 mg at 06/10/18 0808    heparin (porcine) subcutaneous injection 5,000 Units, 5,000 Units, Subcutaneous, Q8H [facility], 5,000 Units at 06/10/18 1455 **AND** [CANCELED] Platelet count, , , Once, Jonna Raya PA-C    insulin glargine (LANTUS) subcutaneous injection 10 Units 0 1 mL, 10 Units, Subcutaneous, HS, Johnson Lind MD, 10 Units at 06/09/18 2135    insulin lispro (HumaLOG) 100 units/mL subcutaneous injection 2-12 Units, 2-12 Units, Subcutaneous, 4x Daily (AC & HS), 2 Units at 06/10/18 1229 **AND** Fingerstick Glucose (POCT), , , 4x Daily AC and at bedtime, Jonna Raya PA-C    nicotine (NICODERM CQ) 14 mg/24hr TD 24 hr patch 1 patch, 1 patch, Transdermal, Daily, Jonna Raya PA-C, 1 patch at 06/10/18 0817    ondansetron (ZOFRAN) injection 4 mg, 4 mg, Intravenous, Q6H PRN, Jonna Raya PA-C    oxyCODONE (ROXICODONE) IR tablet 5 mg, 5 mg, Oral, Q4H PRN, Jonna Raya PA-C, 5 mg at 06/10/18 0610    polyethylene glycol (MIRALAX) packet 17 g, 17 g, Oral, Daily, Jonna Raya PA-C, 17 g at 06/10/18 0808    tamsulosin [facility]) capsule 0 4 mg, 0 4 mg, Oral, Daily With Nelly Umanzor MD, 0 4 mg at 06/09/18 1801    Laboratory Results:    Results from last 7 days  Lab Units 06/10/18  0601 06/09/18  3119 06/08/18  0457 06/07/18  0449 06/06/18  2040 06/06/18  1406 06/06/18  0451 06/05/18  0451 06/04/18  0511   WBC Thousand/uL 5 44 5 94 6 38 5 07  --   --  5 59 4 92 4 46   HEMOGLOBIN g/dL 7 4* 7 5* 7 8* 7 6*  --   --  8 0* 7 9* 8 0*   HEMATOCRIT % 26 0* 25 8* 26 9* 26 3*  --   --  28 1* 27 6* 27 7*   PLATELETS Thousands/uL 216 191 203 200  --   --  207 206 189   SODIUM mmol/L 136 137 138 139 140 139 141 143 143   POTASSIUM mmol/L 5 3 4 9 5 3 5 7* 5 9* 6 0* 6 2* 5 2 5 0   CHLORIDE mmol/L 101 104 104 105 106 106 106 108 109*   CO2 mmol/L 28 27 28 27 26 29 28 28 28   BUN mg/dL 48* 46* 43* 45* 46* 45* 45* 38* 32*   CREATININE mg/dL 2 32* 2 02* 2 01* 2 13* 2 14* 2 11* 2 10* 1 79* 1 81*   CALCIUM mg/dL 8 4 8 8 8 3 8 2* 7 9* 8 1* 8 0* 7 9* 8 2*   PHOSPHORUS mg/dL 5 7*  --   --   --   --   --   --   --   --    GLUCOSE RANDOM mg/dL 146* 50* 104 110 78 84 113 67 103     Previous work up:

## 2018-06-10 NOTE — PROGRESS NOTES
[facility] Progress Note  Patient: Zackary Thompson 56 y o  male   MRN: [id number]  PCP: Vlad Acuna DO  Unit/Bed#: E2 -01 Encounter: [id number]  Date Of Visit: 06/10/18    Assessment/Plan:  1  Scrotal edema- ?cellulitis, likely more related to nephrotic syndrome   2  Diabetic Nephropathy   3  Nephrotic syndrome- likely 2/2 #2  4  MARK   5  COPD  6  Diastolic HF   7  Hyperkalemia   8  Constipation-resolved    9  CXR density   10  Acute hypoxic resp failure     -Ancef day   Afebrile    -Echo with grade 2 diastolic dysfunction in addition to nephrotic syndrome  Started on lasix drip by nephrology  Monitor I/Os, daily weights  Titrate O2 to maintain sats >91%  -CXR will need to be repeated after diuresing to assess density   -c/w iron, occult negative  -BG controlled, c/w insulin regimen  -c/w bowel regimen  -PT, CM for home assistance     Subjective:   Sitting in chair  Doing ok  Denies SOB currently  Still very swollen  Objective:     Vitals:   Temp (24hrs), Av 9 °F (36 6 °C), Min:97 6 °F (36 4 °C), Max:98 3 °F (36 8 °C)    HR:  [] 103  Resp:  [18] 18  BP: (133-153)/(74-86) 136/82  SpO2:  [90 %-97 %] 90 %  Body mass index is 44 48 kg/m²  Input and Output Summary (last 24 hours):        Intake/Output Summary (Last 24 hours) at 06/10/18 0917  Last data filed at 06/10/18 0900   Gross per 24 hour   Intake           2589 8 ml   Output             2639 ml   Net            -49 2 ml       Physical Exam:     Physical Exam    GEN: NAD, obese  HEENT: PERRL  CARDIO: s1 s2 RRR  LUNGS: mild crackles at bases   ABD: Soft, NT, edema  EXT: LE +edema       Additional Data:     Labs:      Results from last 7 days  Lab Units 06/10/18  0601   WBC Thousand/uL 5 44   HEMOGLOBIN g/dL 7 4*   HEMATOCRIT % 26 0*   PLATELETS Thousands/uL 216   NEUTROS PCT % 68   LYMPHS PCT % 19   MONOS PCT % 11   EOS PCT % 2       Results from last 7 days  Lab Units 06/10/18  0601   SODIUM mmol/L 136   POTASSIUM mmol/L 5 3   CHLORIDE mmol/L 101   CO2 mmol/L 28   BUN mg/dL 48*   CREATININE mg/dL 2 32*   CALCIUM mg/dL 8 4   GLUCOSE RANDOM mg/dL 146*           * I Have Reviewed All Lab Data Listed Above  * Additional Pertinent Lab Tests Reviewed:  [facility] Admission Reviewed    Imaging:    Imaging Reports Reviewed Today Include: all available     Recent Cultures (last 7 days):           Last 24 Hours Medication List:     Current Facility-Administered Medications:  acetaminophen 650 mg Oral Q6H PRN Jonna Raya PA-C    amitriptyline 75 mg Oral HS Jonna Raya PA-C    aspirin 81 mg Oral Daily Jonna Raya PA-C    atorvastatin 40 mg Oral Daily With Mariana Raya PA-C    bisacodyl 10 mg Rectal Daily PRN Johnson Lind MD    carvedilol 6 25 mg Oral BID With Meals Natasha Flores DO    cefazolin 1,000 mg Intravenous Q8H Johnson Lind MD Last Rate: Stopped (06/10/18 0508)   dextrose 25 mL Intravenous Once Johnson Lind MD    docusate sodium 100 mg Oral BID Jonna Raya PA-C    ferrous sulfate 325 mg Oral BID With Meals Johnson Lind MD    fluticasone-salmeterol 1 puff Inhalation Q12H [address] Jonna Wong PA-C    furosemide 20 mg/hr Intravenous Continuous Bruce Rivera MD Last Rate: 20 mg/hr (06/10/18 0609)   gabapentin 900 mg Oral TID Jonna Raya PA-C    heparin (porcine) 5,000 Units Subcutaneous Q8H [address] Jonna Raya PA-C    insulin glargine 10 Units Subcutaneous HS Johnson Lind MD    insulin lispro 2-12 Units Subcutaneous 4x Daily (AC & HS) Jonna Raya PA-C    nicotine 1 patch Transdermal Daily Jonna Raya PA-C    ondansetron 4 mg Intravenous Q6H PRN Jonna Raya PA-C    oxyCODONE 5 mg Oral Q4H PRN Jonna Raya PA-C    polyethylene glycol 17 g Oral Daily Jonna Raya PA-C    tamsulosin 0 4 mg Oral Daily With Magda Valencia MD         Today, Patient Was Seen By: Johnson Lind MD    ** Please Note: This note has been constructed using a voice recognition system   **

## 2018-06-11 LAB
ANION GAP SERPL CALCULATED.3IONS-SCNC: 9 MMOL/L (ref 4–13)
BASOPHILS # BLD AUTO: 0.01 THOUSANDS/ΜL (ref 0–0.1)
BASOPHILS NFR BLD AUTO: 0 % (ref 0–1)
BUN SERPL-MCNC: 53 MG/DL (ref 5–25)
CALCIUM SERPL-MCNC: 8.8 MG/DL (ref 8.3–10.1)
CHLORIDE SERPL-SCNC: 100 MMOL/L (ref 100–108)
CO2 SERPL-SCNC: 28 MMOL/L (ref 21–32)
CREAT SERPL-MCNC: 2.4 MG/DL (ref 0.6–1.3)
EOSINOPHIL # BLD AUTO: 0.08 THOUSAND/ΜL (ref 0–0.61)
EOSINOPHIL NFR BLD AUTO: 2 % (ref 0–6)
ERYTHROCYTE [DISTWIDTH] IN BLOOD BY AUTOMATED COUNT: 15.4 % (ref 11.6–15.1)
GFR SERPL CREATININE-BSD FRML MDRD: 29 ML/MIN/1.73SQ M
GLUCOSE SERPL-MCNC: 124 MG/DL (ref 65–140)
GLUCOSE SERPL-MCNC: 139 MG/DL (ref 65–140)
GLUCOSE SERPL-MCNC: 139 MG/DL (ref 65–140)
GLUCOSE SERPL-MCNC: 144 MG/DL (ref 65–140)
GLUCOSE SERPL-MCNC: 163 MG/DL (ref 65–140)
HCT VFR BLD AUTO: 26 % (ref 36.5–49.3)
HGB BLD-MCNC: 7.5 G/DL (ref 12–17)
LYMPHOCYTES # BLD AUTO: 1.15 THOUSANDS/ΜL (ref 0.6–4.47)
LYMPHOCYTES NFR BLD AUTO: 21 % (ref 14–44)
MCH RBC QN AUTO: 27.8 PG (ref 26.8–34.3)
MCHC RBC AUTO-ENTMCNC: 28.8 G/DL (ref 31.4–37.4)
MCV RBC AUTO: 96 FL (ref 82–98)
MONOCYTES # BLD AUTO: 0.83 THOUSAND/ΜL (ref 0.17–1.22)
MONOCYTES NFR BLD AUTO: 16 % (ref 4–12)
NEUTROPHILS # BLD AUTO: 3.3 THOUSANDS/ΜL (ref 1.85–7.62)
NEUTS SEG NFR BLD AUTO: 61 % (ref 43–75)
NRBC BLD AUTO-RTO: 0 /100 WBCS
PLATELET # BLD AUTO: 208 THOUSANDS/UL (ref 149–390)
PMV BLD AUTO: 10.2 FL (ref 8.9–12.7)
POTASSIUM SERPL-SCNC: 5.1 MMOL/L (ref 3.5–5.3)
RBC # BLD AUTO: 2.7 MILLION/UL (ref 3.88–5.62)
SODIUM SERPL-SCNC: 137 MMOL/L (ref 136–145)
WBC # BLD AUTO: 5.37 THOUSAND/UL (ref 4.31–10.16)

## 2018-06-11 PROCEDURE — 82948 REAGENT STRIP/BLOOD GLUCOSE: CPT

## 2018-06-11 PROCEDURE — 80048 BASIC METABOLIC PNL TOTAL CA: CPT | Performed by: INTERNAL MEDICINE

## 2018-06-11 PROCEDURE — 99232 SBSQ HOSP IP/OBS MODERATE 35: CPT | Performed by: PHYSICIAN ASSISTANT

## 2018-06-11 PROCEDURE — 99232 SBSQ HOSP IP/OBS MODERATE 35: CPT | Performed by: INTERNAL MEDICINE

## 2018-06-11 PROCEDURE — 85025 COMPLETE CBC W/AUTO DIFF WBC: CPT | Performed by: INTERNAL MEDICINE

## 2018-06-11 RX ORDER — METOLAZONE 5 MG/1
5 TABLET ORAL DAILY
Status: DISCONTINUED | OUTPATIENT
Start: 2018-06-11 | End: 2018-06-12

## 2018-06-11 RX ADMIN — GABAPENTIN 900 MG: 300 CAPSULE ORAL at 08:11

## 2018-06-11 RX ADMIN — CARVEDILOL 6.25 MG: 3.12 TABLET, FILM COATED ORAL at 16:48

## 2018-06-11 RX ADMIN — INSULIN GLARGINE 10 UNITS: 100 INJECTION, SOLUTION SUBCUTANEOUS at 21:21

## 2018-06-11 RX ADMIN — FLUTICASONE PROPIONATE AND SALMETEROL 1 PUFF: 50; 250 POWDER RESPIRATORY (INHALATION) at 08:06

## 2018-06-11 RX ADMIN — FLUTICASONE PROPIONATE AND SALMETEROL 1 PUFF: 50; 250 POWDER RESPIRATORY (INHALATION) at 21:20

## 2018-06-11 RX ADMIN — FERROUS SULFATE TAB 325 MG (65 MG ELEMENTAL FE) 325 MG: 325 (65 FE) TAB at 16:48

## 2018-06-11 RX ADMIN — GABAPENTIN 900 MG: 300 CAPSULE ORAL at 21:21

## 2018-06-11 RX ADMIN — CEFAZOLIN SODIUM 1000 MG: 1 SOLUTION INTRAVENOUS at 04:57

## 2018-06-11 RX ADMIN — FERROUS SULFATE TAB 325 MG (65 MG ELEMENTAL FE) 325 MG: 325 (65 FE) TAB at 08:11

## 2018-06-11 RX ADMIN — POLYETHYLENE GLYCOL 3350 17 G: 17 POWDER, FOR SOLUTION ORAL at 08:11

## 2018-06-11 RX ADMIN — Medication 20 MG/HR: at 07:59

## 2018-06-11 RX ADMIN — INSULIN LISPRO 2 UNITS: 100 INJECTION, SOLUTION INTRAVENOUS; SUBCUTANEOUS at 11:27

## 2018-06-11 RX ADMIN — CARVEDILOL 6.25 MG: 3.12 TABLET, FILM COATED ORAL at 08:09

## 2018-06-11 RX ADMIN — HEPARIN SODIUM 5000 UNITS: 5000 INJECTION, SOLUTION INTRAVENOUS; SUBCUTANEOUS at 21:21

## 2018-06-11 RX ADMIN — HEPARIN SODIUM 5000 UNITS: 5000 INJECTION, SOLUTION INTRAVENOUS; SUBCUTANEOUS at 13:35

## 2018-06-11 RX ADMIN — DESMOPRESSIN ACETATE 40 MG: 0.2 TABLET ORAL at 16:48

## 2018-06-11 RX ADMIN — DOCUSATE SODIUM 100 MG: 100 CAPSULE, LIQUID FILLED ORAL at 16:48

## 2018-06-11 RX ADMIN — AMITRIPTYLINE HYDROCHLORIDE 75 MG: 50 TABLET, FILM COATED ORAL at 21:21

## 2018-06-11 RX ADMIN — ASPIRIN 81 MG: 81 TABLET, COATED ORAL at 08:08

## 2018-06-11 RX ADMIN — GABAPENTIN 900 MG: 300 CAPSULE ORAL at 16:48

## 2018-06-11 RX ADMIN — OXYCODONE HYDROCHLORIDE 5 MG: 5 TABLET ORAL at 10:48

## 2018-06-11 RX ADMIN — DOCUSATE SODIUM 100 MG: 100 CAPSULE, LIQUID FILLED ORAL at 08:10

## 2018-06-11 RX ADMIN — METOLAZONE 5 MG: 5 TABLET ORAL at 11:28

## 2018-06-11 RX ADMIN — OXYCODONE HYDROCHLORIDE 5 MG: 5 TABLET ORAL at 17:46

## 2018-06-11 RX ADMIN — HEPARIN SODIUM 5000 UNITS: 5000 INJECTION, SOLUTION INTRAVENOUS; SUBCUTANEOUS at 05:00

## 2018-06-11 RX ADMIN — NICOTINE 1 PATCH: 14 PATCH TRANSDERMAL at 08:14

## 2018-06-11 RX ADMIN — TAMSULOSIN HYDROCHLORIDE 0.4 MG: 0.4 CAPSULE ORAL at 16:48

## 2018-06-11 NOTE — CASE MANAGEMENT
Continued Stay Review    Date: 6/11    Vital Signs: BP (!) 188/92 (BP Location: Right arm)   Pulse 83   Temp (!) 97 °F (36 1 °C) (Tympanic)   Resp 20   Ht 5' 10" (1 778 m)   Wt (!) 139 kg (306 lb 7 oz)   SpO2 93%   BMI 43 97 kg/m²     Medications:   Scheduled Meds:   Current Facility-Administered Medications:  acetaminophen 650 mg Oral Q6H PRN Jonna Raya PA-C    amitriptyline 75 mg Oral HS Jonna Raya PA-C    aspirin 81 mg Oral Daily Jonna Raya PA-C    atorvastatin 40 mg Oral Daily With Mariana Raya PA-C    bisacodyl 10 mg Rectal Daily PRN Maben Given, MD    carvedilol 6 25 mg Oral BID With Meals [facility],     dextrose 25 mL Intravenous Once Maben Given, MD    docusate sodium 100 mg Oral BID Jonna Raya PA-C    ferrous sulfate 325 mg Oral BID With Meals Maben Given, MD    fluticasone-salmeterol 1 puff Inhalation Q12H [facility] Jonna Swanson PA-C    furosemide 20 mg/hr Intravenous Continuous Rommel Dennis MD Last Rate: 20 mg/hr (06/11/18 7333)   gabapentin 900 mg Oral TID Jonna Raya PA-C    heparin (porcine) 5,000 Units Subcutaneous Q8H [facility] Jonna Raya PA-C    insulin glargine 10 Units Subcutaneous HS Maben Given, MD    insulin lispro 2-12 Units Subcutaneous 4x Daily (AC & HS) Jonna Raya PA-C    metolazone 5 mg Oral Daily Janes Rosenthal DO    nicotine 1 patch Transdermal Daily Jonna Raya PA-C    ondansetron 4 mg Intravenous Q6H PRN Jonna Raya PA-C    oxyCODONE 5 mg Oral Q4H PRN Jonna Raya PA-C    polyethylene glycol 17 g Oral Daily Jonna Raya PA-C    tamsulosin 0 4 mg Oral Daily With Magdy Bowie MD      Continuous Infusions:   furosemide 20 mg/hr Last Rate: 20 mg/hr (06/11/18 0759)     PRN Meds:   acetaminophen    bisacodyl    ondansetron    oxyCODONE    Abnormal Labs/Diagnostic Results: Results from last 7 days  Lab Units 06/11/18  0512   WBC Thousand/uL 5 37 HEMOGLOBIN g/dL 7 5*   HEMATOCRIT % 26 0*   PLATELETS Thousands/uL 208   NEUTROS PCT % 61   LYMPHS PCT % 21   MONOS PCT % 16*   EOS PCT % 2         Results from last 7 days  Lab Units 06/11/18  0512   SODIUM mmol/L 137   POTASSIUM mmol/L 5 1   CHLORIDE mmol/L 100   CO2 mmol/L 28   BUN mg/dL 53*   CREATININE mg/dL 2 40*   CALCIUM mg/dL 8 8   GLUCOSE RANDOM mg/dL 124          Age/Sex: 56 y o  male     Assessment/Plan: IM note  6/11  Assessment:     Principal Problem:    Cellulitis of scrotum  Active Problems:    Hydrocele    DILCIA (acute kidney injury) ([facility] [address] )    Constipation    Diabetes ([facility] )    Hypertension    Current smoker    At risk for noncompliance    Anemia   Plan:   · Scrotal edema/ probable cellulitis: Seen and evaluated by urology who has recommended scrotal elevation, and course of IV antibiotics  No evidence of significant infection, abscess, or gangrene  Completed a 10 day course of IV Ancef  Patient remains afebrile  And without a white count  Suspect edema more related to nephrotic syndrome  Currently on a Lasix drip which was started by Nephrology  Continue to monitor I's &O's  and daily weights  Current weight appears to be trending downward at 306 lbs     · DILCIA on CKD stage 3: difficult to discern baseline  Creatinine on admission was 1 95  Creatinine slightly worsened on Lasix drip  Cr yesterday 2 32 --> 2 40  Appreciate Nephrology recommendations      · Proteinurea/nephrotic syndrome:   Lab workup on admission revealed albumin of 2 4 and a urine protein creatinine ratio 4 6 g  Patient presenting with significant edema and scrotal edema   Despite being maintained on outpatient oral diuretics  Workup ongoing per Nephrology- SPEP/UPEP/light chains pending  Hepatitis panel negative  Will need nephroogy follow-up upon discharge    · Acute on chronic diastolic CHF: Echocardiogram with grade 2 Diastolic dysfunction    Chest x-ray from 06/07/2018 with pulmonary vascular congestion and focal increased density over the left hilum  Changes most likely secondary to vascular congestion however well repeat chest x-ray once volume status is optimized to exclude other etiologies    Acute hypoxic respiratory failure: In setting of volume overload  Continue supplemental oxygen to maintain saturations greater than 90%   Type 2 diabetes: A1c 7 5  With diabetic nephropathy/nephrotic syndrome/peripheral neuropathy  Continue gabapentin  Metformin held  Pt did have a few   Episodes of hypoglycemia during this admission however blood sugars have improved and are currently 129, 155, 139  Continue lantus 10 units before bed and sliding scale    · Hyperlipidemia: Controlled on statin  Continue   · Anemia of chronic disease:  Hemoglobin 7 5  Appears to be within baseline which is 7 5 to 8 0  Hemoccult negative  Iron studies obtained this admission- Iron saturation 10, TIBC 246, Iron 24  Continue iron supplementation  Will continue to trend hbg and monitor if transfusion is needed     · Tobacco abuse:  Continue nicotine patch  Counseled on cessation   BPH: Continue Flomax    Anxiety/depression: Continue amitriptyline   · History of homelessness   VTE Pharmacologic Prophylaxis:   Pharmacologic: Heparin  Mechanical VTE Prophylaxis in Place: Yes     Discharge Plan: With need for continued inpatient stay on lasix drip  Will need STR when medically stable      Discussions with Specialists or Other Care Team Provider: Dr Wayne Guzman  Education and Discussions with Family / Patient: patient   Time Spent for Care: 30 minutes  More than 50% of total time spent on counseling and coordination of care as described above    Current Length of Stay: 10 day(s)  Current Patient Status: Inpatient   Discharge Plan: TBD     Nephrology note 6/11  ASSESSMENT/PLAN:  1  Acute kidney injury, likely secondary to volume overload   2  Likely chronic kidney disease, question previous baseline, noted nephrotic range proteinuria  3   Nephrotic range proteinuria, likely secondary to longstanding diabetes as well as obesity  , SPEP/UPEP pending, free light chain assay pending  4  Volume overload with associated scrotal edema  5  Anemia, status post Venofer, low iron saturation  6  Hypertension, overall stable  7   Diabetes, poorly controlled   PLAN:  · Continue with Lasix drip as well as metolazone  · Hopeful plateau in serum creatinine  · Given patient's volume status, suspect will require higher creatinine  · Await SPEP/UPEP , free light chain assay

## 2018-06-11 NOTE — PROGRESS NOTES
NEPHROLOGY PROGRESS NOTE   Jerry Mandujano 56 y o  male MRN: [id number]  Unit/Bed#: E2 -01 Encounter: [id number]  Reason for Consult:  Acute kidney injury    ASSESSMENT/PLAN:  1  Acute kidney injury, likely secondary to volume overload   2  Likely chronic kidney disease, question previous baseline, noted nephrotic range proteinuria  3  Nephrotic range proteinuria, likely secondary to longstanding diabetes as well as obesity  , SPEP/UPEP pending, free light chain assay pending  4  Volume overload with associated scrotal edema  5  Anemia, status post Venofer, low iron saturation  6  Hypertension, overall stable  7  Diabetes, poorly controlled    PLAN:  · Continue with Lasix drip as well as metolazone  · Hopeful plateau in serum creatinine  · Given patient's volume status, suspect will require higher creatinine  · Await SPEP/UPEP , free light chain assay    SUBJECTIVE:  Seen and examined  Patient is still complaining of significant scrotal and leg swelling  Tolerating Lasix drip, negative balance  Denies any chest pain or shortness of Breath  Discussed at length with patient regarding his previous history  Diabetes has been poorly controlled over last 10 years  May have had diabetes for much longer  Review of Systems    OBJECTIVE:  Current Weight: Weight - Scale: (!) 139 kg (306 lb 7 oz)  Vitals:    06/10/18 2317 06/11/18 0519 06/11/18 0546 06/11/18 0704   BP: 142/74   154/75   BP Location: Right arm   Right arm   Pulse: 81   92   Resp: 18   18   Temp: (!) 96 9 °F (36 1 °C)   (!) 96 9 °F (36 1 °C)   TempSrc: Tympanic   Tympanic   SpO2: 90%   92%   Weight:  (!) 139 kg (306 lb 7 oz) (!) 139 kg (306 lb 7 oz)    Height:           Intake/Output Summary (Last 24 hours) at 06/11/18 1055  Last data filed at 06/11/18 0531   Gross per 24 hour   Intake              360 ml   Output             3577 ml   Net            -3217 ml       Physical Exam   Constitutional: No distress  HENT:   Head: Normocephalic     Eyes: No scleral icterus  Neck: Neck supple  Cardiovascular: Normal rate  Pulmonary/Chest: Effort normal and breath sounds normal    Abdominal: Soft  He exhibits distension  There is no tenderness  Musculoskeletal: He exhibits edema (2+ edema extending to the upper thighs)  Neurological: He is alert  Skin: Skin is warm and dry  Psychiatric: He has a normal mood and affect         Medications:    Current Facility-Administered Medications:     acetaminophen (TYLENOL) tablet 650 mg, 650 mg, Oral, Q6H PRN, Jonna Raya PA-C, 650 mg at 06/04/18 1936    amitriptyline (ELAVIL) tablet 75 mg, 75 mg, Oral, HS, Jonna Raya PA-C, 75 mg at 06/10/18 2158    aspirin (ECOTRIN LOW STRENGTH) EC tablet 81 mg, 81 mg, Oral, Daily, Jonna Raya PA-C, 81 mg at 06/11/18 0808    atorvastatin (LIPITOR) tablet 40 mg, 40 mg, Oral, Daily With Carroll Cockayne M Delabre, PA-C, 40 mg at 06/10/18 1624    bisacodyl (DULCOLAX) rectal suppository 10 mg, 10 mg, Rectal, Daily PRN, Peña Prince MD, 10 mg at 06/07/18 1942    carvedilol (COREG) tablet 6 25 mg, 6 25 mg, Oral, BID With Meals, Kd Oshea DO, 6 25 mg at 06/11/18 0809    dextrose 50 % IV solution 25 mL, 25 mL, Intravenous, Once, Peña Prince MD, Stopped at 06/09/18 1149    docusate sodium (COLACE) capsule 100 mg, 100 mg, Oral, BID, Jonna Raya PA-C, 100 mg at 06/11/18 6144    ferrous sulfate tablet 325 mg, 325 mg, Oral, BID With Meals, Peña Prince MD, 325 mg at 06/11/18 0811    fluticasone-salmeterol (ADVAIR) 250-50 mcg/dose inhaler 1 puff, 1 puff, Inhalation, Q12H [address], Jonna Raya PA-C, 1 puff at 06/11/18 0806    furosemide (LASIX) 500 mg infusion 50 mL, 20 mg/hr, Intravenous, Continuous, Maira De La Cruz MD, Last Rate: 2 mL/hr at 06/11/18 0759, 20 mg/hr at 06/11/18 0759    gabapentin (NEURONTIN) capsule 900 mg, 900 mg, Oral, TID, Jonna Raya PA-C, 900 mg at 06/11/18 0811    heparin (porcine) subcutaneous injection 5,000 Units, 5,000 Units, Subcutaneous, Q8H [facility], 5,000 Units at 06/11/18 0500 **AND** [CANCELED] Platelet count, , , Once, Jonna Raya PA-C    insulin glargine (LANTUS) subcutaneous injection 10 Units 0 1 mL, 10 Units, Subcutaneous, HS, Katelyn Goldsmith MD, 10 Units at 06/10/18 2159    insulin lispro (HumaLOG) 100 units/mL subcutaneous injection 2-12 Units, 2-12 Units, Subcutaneous, 4x Daily (AC & HS), 2 Units at 06/10/18 2159 **AND** Fingerstick Glucose (POCT), , , 4x Daily AC and at bedtime, Jonna Raya PA-C    metolazone (ZAROXOLYN) tablet 5 mg, 5 mg, Oral, Daily, Charli Rosenthal DO    nicotine (NICODERM CQ) 14 mg/24hr TD 24 hr patch 1 patch, 1 patch, Transdermal, Daily, Jonna Raya PA-C, 1 patch at 06/11/18 0814    ondansetron (ZOFRAN) injection 4 mg, 4 mg, Intravenous, Q6H PRN, Jonna Raya PA-C    oxyCODONE (ROXICODONE) IR tablet 5 mg, 5 mg, Oral, Q4H PRN, Jonna Raya PA-C, 5 mg at 06/11/18 1048    polyethylene glycol (MIRALAX) packet 17 g, 17 g, Oral, Daily, Jonna Raya PA-C, 17 g at 06/11/18 0811    tamsulosin (FLOMAX) capsule 0 4 mg, 0 4 mg, Oral, Daily With Jarocho Stein MD, 0 4 mg at 06/10/18 1621    Laboratory Results:    Results from last 7 days  Lab Units 06/11/18  0512 06/10/18  0601 06/09/18  0635 06/08/18  0457 06/07/18  0449 06/06/18  2040 06/06/18  1406 06/06/18  0451 06/05/18  0451   WBC Thousand/uL 5 37 5 44 5 94 6 38 5 07  --   --  5 59 4 92   HEMOGLOBIN g/dL 7 5* 7 4* 7 5* 7 8* 7 6*  --   --  8 0* 7 9*   HEMATOCRIT % 26 0* 26 0* 25 8* 26 9* 26 3*  --   --  28 1* 27 6*   PLATELETS Thousands/uL 208 216 191 203 200  --   --  207 206   SODIUM mmol/L 137 136 137 138 139 140 139 141 143   POTASSIUM mmol/L 5 1 5 3 4 9 5 3 5 7* 5 9* 6 0* 6 2* 5 2   CHLORIDE mmol/L 100 101 104 104 105 106 106 106 108   CO2 mmol/L 28 28 27 28 27 26 29 28 28   BUN mg/dL 53* 48* 46* 43* 45* 46* 45* 45* 38*   CREATININE mg/dL 2 40* 2 32* 2 02* 2 01* 2 13* 2 14* 2 11* 2 10* 1 79*   CALCIUM mg/dL 8 8 8 4 8 8 8 3 8 2* 7 9* 8 1* 8 0* 7 9*   PHOSPHORUS mg/dL  --  5 7*  --   --   --   --   --   --   --    GLUCOSE RANDOM mg/dL 124 146* 50* 104 110 78 84 113 67

## 2018-06-11 NOTE — PROGRESS NOTES
Pt assisted OOB to chair with Ax2 w/RW  Sitting up in chair eating dinner  Offers no complaints at this time  Call bell within reach  I agree with previous head to toe assessment

## 2018-06-11 NOTE — PROGRESS NOTES
[facility] Progress Note  Patient: Marcos Och 56 y o  male   MRN: [id number]  PCP: Idalia Stahl DO  Unit/Bed#: E2 -01 Encounter: [id number]  Date Of Visit: 06/11/18    Assessment:    Principal Problem:    Cellulitis of scrotum  Active Problems:    Hydrocele    DILCIA (acute kidney injury) ([facility] [address] )    Constipation    Di[facility] ([facility] )    Hypertension    Current smoker    At risk for noncompliance    Anemia      Plan:    · Scrotal edema/ probable cellulitis: Seen and evaluated by urology who has recommended scrotal elevation, and course of IV antibiotics  No evidence of significant infection, abscess, or gangrene  Completed a 10 day course of IV Ancef  Patient remains afebrile  And without a white count  Suspect edema more related to nephrotic syndrome  Currently on a Lasix drip which was started by Nephrology  Continue to monitor I's &O's  and daily weights  Current weight appears to be trending downward at 306 lbs  · DILCIA on CKD stage 3: difficult to discern baseline  Creatinine on admission was 1 95  Creatinine slightly worsened on Lasix drip  Cr yesterday 2 32 --> 2 40  Appreciate Nephrology recommendations  · Proteinurea/nephrotic syndrome:   Lab workup on admission revealed albumin of 2 4 and a urine protein creatinine ratio 4 6 g  Patient presenting with significant edema and scrotal edema   Despite being maintained on outpatient oral diuretics  Workup ongoing per Nephrology- SPEP/UPEP/light chains pending  Hepatitis panel negative  Will need nephroogy follow-up upon discharge  · Acute on chronic diastolic CHF: Echocardiogram with grade 2 Diastolic dysfunction  Chest x-ray from 06/07/2018 with pulmonary vascular congestion and focal increased density over the left hilum  Changes most likely secondary to vascular congestion however well repeat chest x-ray once volume status is optimized to exclude other etiologies  · Acute hypoxic respiratory failure:   In setting of volume overload  Continue supplemental oxygen to maintain saturations greater than 90%  · Type 2 diabetes: A1c 7 5  With diabetic nephropathy/nephrotic syndrome/peripheral neuropathy  Continue gabapentin  Metformin held  Pt did have a few   Episodes of hypoglycemia during this admission however blood sugars have improved and are currently 129, 155, 139  Continue lantus 10 units before bed and sliding scale  · Hyperlipidemia: Controlled on statin  Continue    · Anemia of chronic disease:  Hemoglobin 7 5  Appears to be within baseline which is 7 5 to 8 0  Hemoccult negative  Iron studies obtained this admission- Iron saturation 10, TIBC 246, Iron 24  Continue iron supplementation  Will continue to trend hbg and monitor if transfusion is needed  · Tobacco abuse:  Continue nicotine patch  Counseled on cessation    · BPH: Continue Flomax     · Anxiety/depression: Continue amitriptyline    · History of homelessness      VTE Pharmacologic Prophylaxis:   Pharmacologic: Heparin  Mechanical VTE Prophylaxis in Place: Yes    Discharge Plan: With need for continued inpatient stay on lasix drip  Will need STR when medically stable  Discussions with Specialists or Other Care Team Provider: Dr Zayra Carroen    Education and Discussions with Family / Patient: patient    Time Spent for Care: 30 minutes  More than 50% of total time spent on counseling and coordination of care as described above  Current Length of Stay: 10 day(s)  Current Patient Status: Inpatient   Code Status: Level 1 - Full Code    Subjective:   Patient sitting in chair at bedside  Patient is a poor historian has limited insight into his current medical condition  He notes his   Scrotalswelling is still present and still with lower extremity edema  He becomes mildly short of breath at times  No other complaints  Denies any chest pain, chest pressure, palpitations, abdominal pain, diarrhea or dysuria  Tolerating oral diet      Objective:     Vitals: Temp (24hrs), Av 6 °F (36 4 °C), Min:96 9 °F (36 1 °C), Max:98 9 °F (37 2 °C)    HR:  [81-92] 92  Resp:  [18] 18  BP: (132-154)/(74-75) 154/75  SpO2:  [90 %-96 %] 92 %  Body mass index is 43 97 kg/m²  Input and Output Summary (last 24 hours): Intake/Output Summary (Last 24 hours) at 18 1028  Last data filed at 18 0531   Gross per 24 hour   Intake              360 ml   Output             3577 ml   Net            -3217 ml       Physical Exam:     Physical Exam   Constitutional: He is oriented to person, place, and time  He appears well-developed and well-nourished  HENT:   Head: Normocephalic and atraumatic  Eyes: Conjunctivae are normal    Cardiovascular: Normal rate, regular rhythm and normal heart sounds  Pulmonary/Chest: Effort normal and breath sounds normal  No respiratory distress  He has no wheezes  He has no rales  He exhibits no tenderness  Abdominal: Soft  Bowel sounds are normal  He exhibits no distension and no mass  There is no tenderness  There is no rebound and no guarding  Morbidly obese   Genitourinary:   Genitourinary Comments: Scrotal swelling noted   Musculoskeletal: He exhibits edema  Neurological: He is alert and oriented to person, place, and time  Skin: Skin is warm and dry  Psychiatric: He has a normal mood and affect  His behavior is normal    Nursing note and vitals reviewed  Additional Data:     Labs:      Results from last 7 days  Lab Units 18  0512   WBC Thousand/uL 5 37   HEMOGLOBIN g/dL 7 5*   HEMATOCRIT % 26 0*   PLATELETS Thousands/uL 208   NEUTROS PCT % 61   LYMPHS PCT % 21   MONOS PCT % 16*   EOS PCT % 2       Results from last 7 days  Lab Units 18  0512   SODIUM mmol/L 137   POTASSIUM mmol/L 5 1   CHLORIDE mmol/L 100   CO2 mmol/L 28   BUN mg/dL 53*   CREATININE mg/dL 2 40*   CALCIUM mg/dL 8 8   GLUCOSE RANDOM mg/dL 124           * I Have Reviewed All Lab Data Listed Above  * Additional Pertinent Lab Tests Reviewed:  All [place] Admission Reviewed    Imaging:    Imaging Reports Reviewed Today Include:   Imaging Personally Reviewed by Myself Includes:      Recent Cultures (last 7 days):           Last 24 Hours Medication List:     Current Facility-Administered Medications:  acetaminophen 650 mg Oral Q6H PRN Jonna Raya PA-C    amitriptyline 75 mg Oral HS Jonna Raya PA-C    aspirin 81 mg Oral Daily Jonna Raya PA-C    atorvastatin 40 mg Oral Daily With Big Chantell Raya PA-C    bisacodyl 10 mg Rectal Daily PRN Chriss Napoles MD    carvedilol 6 25 mg Oral BID With Meals [facility],     cefazolin 1,000 mg Intravenous Q8H Chriss Napoles MD Last Rate: Stopped (06/11/18 0700)   dextrose 25 mL Intravenous Once Chriss Napoles MD    docusate sodium 100 mg Oral BID Jonna Raya PA-C    ferrous sulfate 325 mg Oral BID With Meals Chriss Napoles MD    fluticasone-salmeterol 1 puff Inhalation Q12H [facility] Jonna Doss PA-C    furosemide 20 mg/hr Intravenous Continuous Amando Andrew MD Last Rate: 20 mg/hr (06/11/18 0759)   gabapentin 900 mg Oral TID Jonna Raya PA-C    heparin (porcine) 5,000 Units Subcutaneous Q8H [facility] Jonna Raya PA-C    insulin glargine 10 Units Subcutaneous HS Chriss Napoles MD    insulin lispro 2-12 Units Subcutaneous 4x Daily (AC & HS) Jonna Raya PA-C    nicotine 1 patch Transdermal Daily Jonna Raya PA-C    ondansetron 4 mg Intravenous Q6H PRN Jonna Raya PA-C    oxyCODONE 5 mg Oral Q4H PRN Jonna Raya PA-C    polyethylene glycol 17 g Oral Daily Jonna Raya PA-C    tamsulosin 0 4 mg Oral Daily With Fco Valencia MD         Today, Patient Was Seen By: Sergio Wasserman PA-C    ** Please Note: This note has been constructed using a voice recognition system   **

## 2018-06-12 LAB
ALBUMIN SERPL ELPH-MCNC: 2.91 G/DL (ref 3.5–5)
ALBUMIN SERPL ELPH-MCNC: 47 % (ref 52–65)
ALBUMIN UR ELPH-MCNC: 70.4 %
ALPHA1 GLOB MFR UR ELPH: 9.1 %
ALPHA1 GLOB SERPL ELPH-MCNC: 0.5 G/DL (ref 0.1–0.4)
ALPHA1 GLOB SERPL ELPH-MCNC: 8.1 % (ref 2.5–5)
ALPHA2 GLOB MFR UR ELPH: 3.4 %
ALPHA2 GLOB SERPL ELPH-MCNC: 0.99 G/DL (ref 0.4–1.2)
ALPHA2 GLOB SERPL ELPH-MCNC: 15.9 % (ref 7–13)
ANION GAP SERPL CALCULATED.3IONS-SCNC: 6 MMOL/L (ref 4–13)
B-GLOBULIN MFR UR ELPH: 7.1 %
BETA GLOB ABNORMAL SERPL ELPH-MCNC: 0.43 G/DL (ref 0.4–0.8)
BETA1 GLOB SERPL ELPH-MCNC: 6.9 % (ref 5–13)
BETA2 GLOB SERPL ELPH-MCNC: 7.3 % (ref 2–8)
BETA2+GAMMA GLOB SERPL ELPH-MCNC: 0.45 G/DL (ref 0.2–0.5)
BUN SERPL-MCNC: 53 MG/DL (ref 5–25)
CALCIUM SERPL-MCNC: 9.1 MG/DL (ref 8.3–10.1)
CHLORIDE SERPL-SCNC: 99 MMOL/L (ref 100–108)
CO2 SERPL-SCNC: 32 MMOL/L (ref 21–32)
CREAT SERPL-MCNC: 2.48 MG/DL (ref 0.6–1.3)
ERYTHROCYTE [DISTWIDTH] IN BLOOD BY AUTOMATED COUNT: 15 % (ref 11.6–15.1)
GAMMA GLOB ABNORMAL SERPL ELPH-MCNC: 0.92 G/DL (ref 0.5–1.6)
GAMMA GLOB MFR UR ELPH: 10 %
GAMMA GLOB SERPL ELPH-MCNC: 14.8 % (ref 12–22)
GFR SERPL CREATININE-BSD FRML MDRD: 28 ML/MIN/1.73SQ M
GLUCOSE SERPL-MCNC: 129 MG/DL (ref 65–140)
GLUCOSE SERPL-MCNC: 131 MG/DL (ref 65–140)
GLUCOSE SERPL-MCNC: 137 MG/DL (ref 65–140)
GLUCOSE SERPL-MCNC: 143 MG/DL (ref 65–140)
GLUCOSE SERPL-MCNC: 144 MG/DL (ref 65–140)
HCT VFR BLD AUTO: 26.3 % (ref 36.5–49.3)
HGB BLD-MCNC: 7.6 G/DL (ref 12–17)
IGG/ALB SER: 0.89 {RATIO} (ref 1.1–1.8)
KAPPA LC FREE SER-MCNC: 86.5 MG/L (ref 3.3–19.4)
KAPPA LC FREE/LAMBDA FREE SER: 2.42 {RATIO} (ref 0.26–1.65)
LAMBDA LC FREE SERPL-MCNC: 35.8 MG/L (ref 5.7–26.3)
MAGNESIUM SERPL-MCNC: 2.3 MG/DL (ref 1.6–2.6)
MCH RBC QN AUTO: 27.5 PG (ref 26.8–34.3)
MCHC RBC AUTO-ENTMCNC: 28.9 G/DL (ref 31.4–37.4)
MCV RBC AUTO: 95 FL (ref 82–98)
PLATELET # BLD AUTO: 253 THOUSANDS/UL (ref 149–390)
PMV BLD AUTO: 10.1 FL (ref 8.9–12.7)
POTASSIUM SERPL-SCNC: 4.7 MMOL/L (ref 3.5–5.3)
PROT PATTERN SERPL ELPH-IMP: ABNORMAL
PROT PATTERN UR ELPH-IMP: ABNORMAL
PROT SERPL-MCNC: 6.2 G/DL (ref 6.4–8.2)
PROT UR-MCNC: 181 MG/DL
RBC # BLD AUTO: 2.76 MILLION/UL (ref 3.88–5.62)
SODIUM SERPL-SCNC: 137 MMOL/L (ref 136–145)
WBC # BLD AUTO: 4.6 THOUSAND/UL (ref 4.31–10.16)

## 2018-06-12 PROCEDURE — 99232 SBSQ HOSP IP/OBS MODERATE 35: CPT | Performed by: PHYSICIAN ASSISTANT

## 2018-06-12 PROCEDURE — 85027 COMPLETE CBC AUTOMATED: CPT | Performed by: PHYSICIAN ASSISTANT

## 2018-06-12 PROCEDURE — 82948 REAGENT STRIP/BLOOD GLUCOSE: CPT

## 2018-06-12 PROCEDURE — 80048 BASIC METABOLIC PNL TOTAL CA: CPT | Performed by: INTERNAL MEDICINE

## 2018-06-12 PROCEDURE — 99232 SBSQ HOSP IP/OBS MODERATE 35: CPT | Performed by: INTERNAL MEDICINE

## 2018-06-12 PROCEDURE — 83735 ASSAY OF MAGNESIUM: CPT | Performed by: INTERNAL MEDICINE

## 2018-06-12 RX ORDER — AMOXICILLIN 250 MG
1 CAPSULE ORAL 2 TIMES DAILY PRN
Status: DISCONTINUED | OUTPATIENT
Start: 2018-06-12 | End: 2018-06-16 | Stop reason: HOSPADM

## 2018-06-12 RX ADMIN — NICOTINE 1 PATCH: 14 PATCH TRANSDERMAL at 08:05

## 2018-06-12 RX ADMIN — OXYCODONE HYDROCHLORIDE 5 MG: 5 TABLET ORAL at 21:44

## 2018-06-12 RX ADMIN — FLUTICASONE PROPIONATE AND SALMETEROL 1 PUFF: 50; 250 POWDER RESPIRATORY (INHALATION) at 08:08

## 2018-06-12 RX ADMIN — INSULIN GLARGINE 10 UNITS: 100 INJECTION, SOLUTION SUBCUTANEOUS at 21:33

## 2018-06-12 RX ADMIN — DOCUSATE SODIUM 100 MG: 100 CAPSULE, LIQUID FILLED ORAL at 08:05

## 2018-06-12 RX ADMIN — OXYCODONE HYDROCHLORIDE 5 MG: 5 TABLET ORAL at 05:22

## 2018-06-12 RX ADMIN — HEPARIN SODIUM 5000 UNITS: 5000 INJECTION, SOLUTION INTRAVENOUS; SUBCUTANEOUS at 05:22

## 2018-06-12 RX ADMIN — METOLAZONE 5 MG: 5 TABLET ORAL at 08:08

## 2018-06-12 RX ADMIN — OXYCODONE HYDROCHLORIDE 5 MG: 5 TABLET ORAL at 16:42

## 2018-06-12 RX ADMIN — CARVEDILOL 6.25 MG: 3.12 TABLET, FILM COATED ORAL at 08:05

## 2018-06-12 RX ADMIN — POLYETHYLENE GLYCOL 3350 17 G: 17 POWDER, FOR SOLUTION ORAL at 08:05

## 2018-06-12 RX ADMIN — CARVEDILOL 6.25 MG: 3.12 TABLET, FILM COATED ORAL at 16:41

## 2018-06-12 RX ADMIN — FLUTICASONE PROPIONATE AND SALMETEROL 1 PUFF: 50; 250 POWDER RESPIRATORY (INHALATION) at 21:35

## 2018-06-12 RX ADMIN — DESMOPRESSIN ACETATE 40 MG: 0.2 TABLET ORAL at 16:42

## 2018-06-12 RX ADMIN — Medication 20 MG/HR: at 07:24

## 2018-06-12 RX ADMIN — HEPARIN SODIUM 5000 UNITS: 5000 INJECTION, SOLUTION INTRAVENOUS; SUBCUTANEOUS at 21:36

## 2018-06-12 RX ADMIN — FERROUS SULFATE TAB 325 MG (65 MG ELEMENTAL FE) 325 MG: 325 (65 FE) TAB at 08:05

## 2018-06-12 RX ADMIN — HEPARIN SODIUM 5000 UNITS: 5000 INJECTION, SOLUTION INTRAVENOUS; SUBCUTANEOUS at 13:14

## 2018-06-12 RX ADMIN — TAMSULOSIN HYDROCHLORIDE 0.4 MG: 0.4 CAPSULE ORAL at 16:42

## 2018-06-12 RX ADMIN — GABAPENTIN 900 MG: 300 CAPSULE ORAL at 21:35

## 2018-06-12 RX ADMIN — GABAPENTIN 900 MG: 300 CAPSULE ORAL at 16:41

## 2018-06-12 RX ADMIN — GABAPENTIN 900 MG: 300 CAPSULE ORAL at 08:05

## 2018-06-12 RX ADMIN — FERROUS SULFATE TAB 325 MG (65 MG ELEMENTAL FE) 325 MG: 325 (65 FE) TAB at 16:41

## 2018-06-12 RX ADMIN — ASPIRIN 81 MG: 81 TABLET, COATED ORAL at 08:05

## 2018-06-12 RX ADMIN — POLYETHYLENE GLYCOL 3350 17 G: 17 POWDER, FOR SOLUTION ORAL at 05:29

## 2018-06-12 RX ADMIN — AMITRIPTYLINE HYDROCHLORIDE 75 MG: 50 TABLET, FILM COATED ORAL at 21:36

## 2018-06-12 NOTE — PROGRESS NOTES
NEPHROLOGY PROGRESS NOTE   Estella Estevez 56 y o  male MRN: [id number]  Unit/Bed#: E2 -01 Encounter: [id number]  Reason for Consult: DILCIA    ASSESSMENT/PLAN:  1  Acute kidney injury, likely secondary to volume overload   2  Likely chronic kidney disease, question previous baseline, noted nephrotic range proteinuria  3  Nephrotic range proteinuria, likely secondary to longstanding diabetes as well as obesity  , serum electrophoresis without monoclonal gammopathy, free light chain assay pending  4  Diastolic dysfunction, ejection fraction of 45%, grade 2 diastolic dysfunction  5  Volume overload with associated scrotal edema, weight decreasing  6  Anemia, status post Venofer, low iron saturation  7  Hypertension, overall stable  8  Diabetes, poorly controlled    PLAN:  · Overall patient has had marked diuresis, weight is significantly improved  · Given significant polyuria, diuresis, will hold Lasix today, likely transition to oral diuretics tomorrow  · Blood pressure overall is acceptable, hold off on ACE-inhibitor at this time  · Free light chain assay pending    SUBJECTIVE:  Seen and examined  Patient has had marked diuresis, greater than 5 L diuresis  Denies any active chest pain shortness of breath  Denies any abdominal pain  Still with significant lower extremity swelling      Review of Systems    OBJECTIVE:  Current Weight: Weight - Scale: 135 kg (297 lb 6 4 oz)  Vitals:    06/11/18 1500 06/11/18 2300 06/12/18 0550 06/12/18 0709   BP: (!) 188/92 135/62  136/73   BP Location: Right arm Right arm  Right arm   Pulse: 83 89  85   Resp: 20 18 18   Temp: (!) 97 °F (36 1 °C) 98 5 °F (36 9 °C)  98 4 °F (36 9 °C)   TempSrc: Tympanic Temporal  Temporal   SpO2: 93% 92%  92%   Weight:   135 kg (297 lb 6 4 oz)    Height:           Intake/Output Summary (Last 24 hours) at 06/12/18 1109  Last data filed at 06/12/18 0829   Gross per 24 hour   Intake           908 97 ml   Output             5901 ml   Net         -4992 03 ml       Physical Exam   Constitutional: No distress  HENT:   Head: Normocephalic  Eyes: No scleral icterus  Neck: Neck supple  Cardiovascular: Normal rate and regular rhythm  Pulmonary/Chest: Effort normal and breath sounds normal    Abdominal: Soft  He exhibits distension  There is no tenderness  Musculoskeletal: He exhibits edema (2+ edema)  Neurological: He is alert  Skin: Skin is warm and dry  Psychiatric: He has a normal mood and affect         Medications:    Current Facility-Administered Medications:     acetaminophen (TYLENOL) tablet 650 mg, 650 mg, Oral, Q6H PRN, Jonna Raya PA-C, 650 mg at 06/04/18 1936    amitriptyline (ELAVIL) tablet 75 mg, 75 mg, Oral, HS, Jonna Raya PA-C, 75 mg at 06/11/18 2121    aspirin (ECOTRIN LOW STRENGTH) EC tablet 81 mg, 81 mg, Oral, Daily, Jonna Raya PA-C, 81 mg at 06/12/18 0805    atorvastatin (LIPITOR) tablet 40 mg, 40 mg, Oral, Daily With Dinner, Juan Davis PA-C, 40 mg at 06/11/18 1648    bisacodyl (DULCOLAX) rectal suppository 10 mg, 10 mg, Rectal, Daily PRN, Argenis Garcia MD, 10 mg at 06/07/18 1942    carvedilol (COREG) tablet 6 25 mg, 6 25 mg, Oral, BID With Meals, Del Rock DO, 6 25 mg at 06/12/18 0805    dextrose 50 % IV solution 25 mL, 25 mL, Intravenous, Once, Argenis Garcia MD, Stopped at 06/09/18 1149    ferrous sulfate tablet 325 mg, 325 mg, Oral, BID With Meals, Argenis Garcia MD, 325 mg at 06/12/18 0805    fluticasone-salmeterol (ADVAIR) 250-50 mcg/dose inhaler 1 puff, 1 puff, Inhalation, Q12H [address], Jonna Raya PA-C, 1 puff at 06/12/18 0808    gabapentin (NEURONTIN) capsule 900 mg, 900 mg, Oral, TID, Jonna Raya PA-C, 900 mg at 06/12/18 0805    heparin (porcine) subcutaneous injection 5,000 Units, 5,000 Units, Subcutaneous, Q8H [address], 5,000 Units at 06/12/18 0522 **AND** [CANCELED] Platelet count, , , Once, Jonna Raay PA-C    insulin glargine (LANTUS) subcutaneous injection 10 Units 0 1 mL, 10 Units, Subcutaneous, HS, Amy Alvares MD, 10 Units at 06/11/18 2121    insulin lispro (HumaLOG) 100 units/mL subcutaneous injection 2-12 Units, 2-12 Units, Subcutaneous, 4x Daily (AC & HS), 2 Units at 06/11/18 1127 **AND** Fingerstick Glucose (POCT), , , 4x Daily AC and at bedtime, Jonna Raya PA-C    nicotine (NICODERM CQ) 14 mg/24hr TD 24 hr patch 1 patch, 1 patch, Transdermal, Daily, Jonna Raya PA-C, 1 patch at 06/12/18 0805    ondansetron (ZOFRAN) injection 4 mg, 4 mg, Intravenous, Q6H PRN, Jonna Raya PA-C    oxyCODONE (ROXICODONE) IR tablet 5 mg, 5 mg, Oral, Q4H PRN, Jonna Raya PA-C, 5 mg at 06/12/18 0522    polyethylene glycol (MIRALAX) packet 17 g, 17 g, Oral, Daily, Jonna Ryaa PA-C, 17 g at 06/12/18 0805    senna-docusate sodium (SENOKOT S) 8 6-50 mg per tablet 1 tablet, 1 tablet, Oral, BID PRN, Joslyn Davis PA-C    tamsulosin [facility]) capsule 0 4 mg, 0 4 mg, Oral, Daily With Pili Serna MD, 0 4 mg at 06/11/18 1648    Laboratory Results:    Results from last 7 days  Lab Units 06/12/18  0459 06/11/18  0512 06/10/18  0601 06/09/18  0635 06/08/18  0457 06/07/18  0449 06/06/18  2040  06/06/18  0451   WBC Thousand/uL 4 60 5 37 5 44 5 94 6 38 5 07  --   --  5 59   HEMOGLOBIN g/dL 7 6* 7 5* 7 4* 7 5* 7 8* 7 6*  --   --  8 0*   HEMATOCRIT % 26 3* 26 0* 26 0* 25 8* 26 9* 26 3*  --   --  28 1*   PLATELETS Thousands/uL 253 208 216 191 203 200  --   --  207   SODIUM mmol/L 137 137 136 137 138 139 140  < > 141   POTASSIUM mmol/L 4 7 5 1 5 3 4 9 5 3 5 7* 5 9*  < > 6 2*   CHLORIDE mmol/L 99* 100 101 104 104 105 106  < > 106   CO2 mmol/L 32 28 28 27 28 27 26  < > 28   BUN mg/dL 53* 53* 48* 46* 43* 45* 46*  < > 45*   CREATININE mg/dL 2 48* 2 40* 2 32* 2 02* 2 01* 2 13* 2 14*  < > 2 10*   CALCIUM mg/dL 9 1 8 8 8 4 8 8 8 3 8 2* 7 9*  < > 8 0*   MAGNESIUM mg/dL 2 3  --   --   --   --   --   --   --   --    PHOSPHORUS mg/dL  --   --  5 7*  --   --   --   --   --   --    TOTAL PROTEIN g/dL  --   --  6 2*  --   --   --   --   --   --    GLUCOSE RANDOM mg/dL 137 124 146* 50* 104 110 78  < > 113   < > = values in this interval not displayed

## 2018-06-12 NOTE — PROGRESS NOTES
[facility] Progress Note  Patient: Ines Villarreal 56 y o  male   MRN: [id number]  PCP: Eduardo Cheng DO  Unit/Bed#: E2 -01 Encounter: [id number]  Date Of Visit: 06/12/18    Assessment:    Principal Problem:    Cellulitis of scrotum  Active Problems:    Hydrocele    DILCIA (acute kidney injury) ([facility] [address] )    Constipation    Diabetes ([facility] )    Hypertension    Current smoker    At risk for noncompliance    Anemia      Plan:    · Scrotal edema/ probable cellulitis: Seen and evaluated by urology who has recommended scrotal elevation, and course of IV antibiotics  No evidence of significant infection, abscess, or gangrene  Completed a 10 day course of IV Ancef  Patient remains afebrile and without a white count  Suspect edema more related to nephrotic syndrome  Was on Lasix drip and metalazone 5 mg x3 days which was started by Nephrology     -Weight trending downward at 306 lbs -->297    -With > 5 L of diuresis yesterday   -Nephology has d c lasix drip and will most likely transition to oral diuretic therapy tomorrow      · DILCIA on CKD stage 3: difficult to discern baseline  Creatinine on admission was 1 95  Creatinine slightly worsened on Lasix drip  Also on metalazone x3 days  Cr 2 32 --> 2 40 -->2  48  Drip d/c given good diuresis and likely transition to oral diuretics tomorrow per nephrology      · Nephrotic range proteinuria: Likely secondary to long standing diabetes  Lab workup on admission revealed albumin of 2 4 and a urine protein creatinine ratio 4 6 g  Patient presenting with significant edema and scrotal edema despite being maintained on outpatient oral diuretics  Workup ongoing per Nephrology- SPEP/UPEP/light chains pending  Hepatitis panel negative  Will need nephrology follow-up upon discharge       · Acute on chronic diastolic CHF: Echocardiogram with grade 2 Diastolic dysfunction    Chest x-ray from 06/07/2018 with pulmonary vascular congestion and focal increased density over the left hilum   Changes most likely secondary to vascular congestion  Improved clinically  No further need for imaging      · Acute hypoxic respiratory failure: In setting of volume overload  Continue supplemental oxygen to maintain saturations greater than 90%      · Type 2 diabetes: A1c 7 5  With diabetic nephropathy/nephrotic syndrome/peripheral neuropathy  Continue gabapentin  Metformin held  Pt did have a few   Episodes of hypoglycemia during this admission however blood sugars have improved and are currently 144, 143, 129  Continue lantus 10 units before bed and sliding scale      · Hyperlipidemia: Controlled on statin  Continue     · Anemia of chronic disease:  Hemoglobin 7 5 -->7 6  Appears to be within baseline which is 7 5 to 8 0  Hemoccult negative  Iron studies obtained this admission- Iron saturation 10, TIBC 246, Iron 24  s/p IV venofer  Continue iron supplementation  Will continue to trend hbg and monitor if transfusion is needed       · Tobacco abuse:  Continue nicotine patch  Counseled on cessation     · BPH: Continue Flomax      · Anxiety/depression: Continue amitriptyline     · History of homelessness: however pt reports having an apartment and close by help from a friend      VTE Pharmacologic Prophylaxis:   Pharmacologic: Heparin  Mechanical VTE Prophylaxis in Place: Yes    Discharge Plan:  Lasix drip d/c today  Transition to oral diuretics tomorrow  Discussions with Specialists or Other Care Team Provider:  Dr Marshall Grimm    Education and Discussions with Family / Patient:  patient    Time Spent for Care: 20 minutes  More than 50% of total time spent on counseling and coordination of care as described above  Current Length of Stay: 11 day(s)  Current Patient Status: Inpatient   Code Status: Level 1 - Full Code    Subjective:   Resting in bed  Down close to 10 lbs overnight  Still with swelling to lower extremities and scrotum but appears to be improving  Lasix drip discontinued today  Otherwise with no complaints  Denies sob, chest pain, palpitations, N/Vomiting  Has not had a BM for the past few days  Will add yamilex  Objective:     Vitals:   Temp (24hrs), Av °F (36 7 °C), Min:97 °F (36 1 °C), Max:98 5 °F (36 9 °C)    HR:  [83-89] 85  Resp:  [18-20] 18  BP: (135-188)/(62-92) 136/73  SpO2:  [92 %-93 %] 92 %  Body mass index is 42 67 kg/m²  Input and Output Summary (last 24 hours): Intake/Output Summary (Last 24 hours) at 18 1040  Last data filed at 18 0829   Gross per 24 hour   Intake           908 97 ml   Output             6376 ml   Net         -5467 03 ml       Physical Exam:     Physical Exam   Constitutional: He is oriented to person, place, and time  He appears well-developed and well-nourished  HENT:   Head: Normocephalic and atraumatic  Cardiovascular: Normal rate and normal heart sounds  Pulmonary/Chest: Effort normal and breath sounds normal  No respiratory distress  He has no wheezes  He has no rales  He exhibits no tenderness  Abdominal: Soft  Bowel sounds are normal  He exhibits no distension and no mass  There is no tenderness  There is no rebound and no guarding  Morbidly obese   Genitourinary:   Genitourinary Comments: Scrotal edema noted   Musculoskeletal: He exhibits edema  Neurological: He is alert and oriented to person, place, and time  Skin: Skin is warm and dry  Psychiatric: He has a normal mood and affect  His behavior is normal    Nursing note and vitals reviewed        Additional Data:     Labs:      Results from last 7 days  Lab Units 18  0459 18  0512   WBC Thousand/uL 4 60 5 37   HEMOGLOBIN g/dL 7 6* 7 5*   HEMATOCRIT % 26 3* 26 0*   PLATELETS Thousands/uL 253 208   NEUTROS PCT %  --  61   LYMPHS PCT %  --  21   MONOS PCT %  --  16*   EOS PCT %  --  2       Results from last 7 days  Lab Units 18  0459  06/10/18  0601   SODIUM mmol/L 137  < > 136   POTASSIUM mmol/L 4 7  < > 5 3   CHLORIDE mmol/L 99*  < > 101   CO2 mmol/L 32  < > 28   BUN mg/dL 53*  < > 48*   CREATININE mg/dL 2 48*  < > 2 32*   CALCIUM mg/dL 9 1  < > 8 4   TOTAL PROTEIN g/dL  --   --  6 2*   GLUCOSE RANDOM mg/dL 137  < > 146*   < > = values in this interval not displayed  * I Have Reviewed All Lab Data Listed Above  * Additional Pertinent Lab Tests Reviewed:  All Labs Within Last 24 Hours Reviewed    Imaging:    Imaging Reports Reviewed Today Include:   Imaging Personally Reviewed by Myself Includes:      Recent Cultures (last 7 days):           Last 24 Hours Medication List:     Current Facility-Administered Medications:  acetaminophen 650 mg Oral Q6H PRN Jonna Raya PA-C    amitriptyline 75 mg Oral HS Jonna Raya PA-C    aspirin 81 mg Oral Daily Jonna Raya PA-C    atorvastatin 40 mg Oral Daily With Big Chantell Raya PA-C    bisacodyl 10 mg Rectal Daily PRN Rebekah Lkae MD    carvedilol 6 25 mg Oral BID With Meals Evelyn Vera DO    dextrose 25 mL Intravenous Once Rebekah Lake MD    ferrous sulfate 325 mg Oral BID With Meals Rebekah Lake MD    fluticasone-salmeterol 1 puff [facility]    furosemide 20 mg/hr Intravenous Continuous Emily Schultz MD Last Rate: 20 mg/hr (06/12/18 0724)   gabapentin 900 mg Oral TID Jonna Raya PA-C    heparin (porcine) 5,000 Units Subcutaneous Q8H [address] Jonna Raya PA-C    insulin glargine 10 Units Subcutaneous HS Rebekah Lake MD    insulin lispro 2-12 Units Subcutaneous 4x Daily (AC & HS) Jonna Raya PA-C    metolazone 5 mg Oral Daily Brendan Rosenthal DO    nicotine 1 patch Transdermal Daily Jonna Raya PA-C    ondansetron 4 mg Intravenous Q6H PRN Jonna Raya PA-C    oxyCODONE 5 mg Oral Q4H PRN Jonna Raya PA-C    polyethylene glycol 17 g Oral Daily Jonna Raya PA-C    senna-docusate sodium 1 tablet Oral BID PRN Angus Rodriguez PA-C    tamsulosin 0 4 mg Oral Daily With Malgorzata Castellanos MD         Today, Patient Was Seen By: Rafi Eller PA-C    ** Please Note: This note has been constructed using a voice recognition system   **

## 2018-06-12 NOTE — SOCIAL WORK
Referrals sent to [facility] in [place], [place], [place], [address], and [address]  [facility] not [facility] at present  Son does not participate in patient's insurance  [facility] in [place] will follow-up with [facility] beds tomorrow is available  Jaz Muñoz would need updated PT/OT notes  CM to follow as needed

## 2018-06-13 PROBLEM — I50.33 ACUTE ON CHRONIC DIASTOLIC CONGESTIVE HEART FAILURE (HCC): Status: ACTIVE | Noted: 2018-06-13

## 2018-06-13 PROBLEM — J96.01 ACUTE RESPIRATORY FAILURE WITH HYPOXIA (HCC): Status: ACTIVE | Noted: 2018-06-13

## 2018-06-13 PROBLEM — K59.00 CONSTIPATION: Status: RESOLVED | Noted: 2018-06-01 | Resolved: 2018-06-13

## 2018-06-13 LAB
ALBUMIN SERPL BCP-MCNC: 2.3 G/DL (ref 3.5–5)
ALP SERPL-CCNC: 122 U/L (ref 46–116)
ALT SERPL W P-5'-P-CCNC: 7 U/L (ref 12–78)
ANION GAP SERPL CALCULATED.3IONS-SCNC: 5 MMOL/L (ref 4–13)
AST SERPL W P-5'-P-CCNC: 24 U/L (ref 5–45)
BILIRUB SERPL-MCNC: 0.22 MG/DL (ref 0.2–1)
BUN SERPL-MCNC: 51 MG/DL (ref 5–25)
CALCIUM SERPL-MCNC: 8.7 MG/DL (ref 8.3–10.1)
CHLORIDE SERPL-SCNC: 99 MMOL/L (ref 100–108)
CO2 SERPL-SCNC: 34 MMOL/L (ref 21–32)
CREAT SERPL-MCNC: 2.36 MG/DL (ref 0.6–1.3)
GFR SERPL CREATININE-BSD FRML MDRD: 30 ML/MIN/1.73SQ M
GLUCOSE SERPL-MCNC: 115 MG/DL (ref 65–140)
GLUCOSE SERPL-MCNC: 119 MG/DL (ref 65–140)
GLUCOSE SERPL-MCNC: 147 MG/DL (ref 65–140)
GLUCOSE SERPL-MCNC: 172 MG/DL (ref 65–140)
GLUCOSE SERPL-MCNC: 176 MG/DL (ref 65–140)
GLUCOSE SERPL-MCNC: 96 MG/DL (ref 65–140)
PHOSPHATE SERPL-MCNC: 4.9 MG/DL (ref 2.7–4.5)
POTASSIUM SERPL-SCNC: 4.3 MMOL/L (ref 3.5–5.3)
PROT SERPL-MCNC: 6.3 G/DL (ref 6.4–8.2)
SODIUM SERPL-SCNC: 138 MMOL/L (ref 136–145)

## 2018-06-13 PROCEDURE — 99232 SBSQ HOSP IP/OBS MODERATE 35: CPT | Performed by: INTERNAL MEDICINE

## 2018-06-13 PROCEDURE — 97530 THERAPEUTIC ACTIVITIES: CPT

## 2018-06-13 PROCEDURE — 82948 REAGENT STRIP/BLOOD GLUCOSE: CPT

## 2018-06-13 PROCEDURE — 97167 OT EVAL HIGH COMPLEX 60 MIN: CPT

## 2018-06-13 PROCEDURE — 80053 COMPREHEN METABOLIC PANEL: CPT | Performed by: PHYSICIAN ASSISTANT

## 2018-06-13 PROCEDURE — 99232 SBSQ HOSP IP/OBS MODERATE 35: CPT | Performed by: PHYSICIAN ASSISTANT

## 2018-06-13 PROCEDURE — 84100 ASSAY OF PHOSPHORUS: CPT | Performed by: INTERNAL MEDICINE

## 2018-06-13 PROCEDURE — 97116 GAIT TRAINING THERAPY: CPT

## 2018-06-13 PROCEDURE — 97110 THERAPEUTIC EXERCISES: CPT

## 2018-06-13 PROCEDURE — G8987 SELF CARE CURRENT STATUS: HCPCS

## 2018-06-13 PROCEDURE — G8988 SELF CARE GOAL STATUS: HCPCS

## 2018-06-13 RX ORDER — AMLODIPINE BESYLATE 5 MG/1
5 TABLET ORAL DAILY
Status: DISCONTINUED | OUTPATIENT
Start: 2018-06-13 | End: 2018-06-16 | Stop reason: HOSPADM

## 2018-06-13 RX ORDER — TORSEMIDE 20 MG/1
40 TABLET ORAL
Status: DISCONTINUED | OUTPATIENT
Start: 2018-06-13 | End: 2018-06-16 | Stop reason: HOSPADM

## 2018-06-13 RX ADMIN — GABAPENTIN 900 MG: 300 CAPSULE ORAL at 21:38

## 2018-06-13 RX ADMIN — OXYCODONE HYDROCHLORIDE 5 MG: 5 TABLET ORAL at 12:27

## 2018-06-13 RX ADMIN — POLYETHYLENE GLYCOL 3350 17 G: 17 POWDER, FOR SOLUTION ORAL at 08:19

## 2018-06-13 RX ADMIN — FLUTICASONE PROPIONATE AND SALMETEROL 1 PUFF: 50; 250 POWDER RESPIRATORY (INHALATION) at 21:38

## 2018-06-13 RX ADMIN — CARVEDILOL 6.25 MG: 3.12 TABLET, FILM COATED ORAL at 17:10

## 2018-06-13 RX ADMIN — NICOTINE 1 PATCH: 14 PATCH TRANSDERMAL at 08:21

## 2018-06-13 RX ADMIN — FERROUS SULFATE TAB 325 MG (65 MG ELEMENTAL FE) 325 MG: 325 (65 FE) TAB at 08:20

## 2018-06-13 RX ADMIN — DESMOPRESSIN ACETATE 40 MG: 0.2 TABLET ORAL at 17:10

## 2018-06-13 RX ADMIN — GABAPENTIN 900 MG: 300 CAPSULE ORAL at 17:10

## 2018-06-13 RX ADMIN — OXYCODONE HYDROCHLORIDE 5 MG: 5 TABLET ORAL at 17:20

## 2018-06-13 RX ADMIN — INSULIN LISPRO 2 UNITS: 100 INJECTION, SOLUTION INTRAVENOUS; SUBCUTANEOUS at 21:38

## 2018-06-13 RX ADMIN — HEPARIN SODIUM 5000 UNITS: 5000 INJECTION, SOLUTION INTRAVENOUS; SUBCUTANEOUS at 06:22

## 2018-06-13 RX ADMIN — FERROUS SULFATE TAB 325 MG (65 MG ELEMENTAL FE) 325 MG: 325 (65 FE) TAB at 17:10

## 2018-06-13 RX ADMIN — OXYCODONE HYDROCHLORIDE 5 MG: 5 TABLET ORAL at 03:46

## 2018-06-13 RX ADMIN — HEPARIN SODIUM 5000 UNITS: 5000 INJECTION, SOLUTION INTRAVENOUS; SUBCUTANEOUS at 14:50

## 2018-06-13 RX ADMIN — GABAPENTIN 900 MG: 300 CAPSULE ORAL at 08:19

## 2018-06-13 RX ADMIN — TORSEMIDE 40 MG: 20 TABLET ORAL at 12:25

## 2018-06-13 RX ADMIN — INSULIN LISPRO 2 UNITS: 100 INJECTION, SOLUTION INTRAVENOUS; SUBCUTANEOUS at 17:10

## 2018-06-13 RX ADMIN — ASPIRIN 81 MG: 81 TABLET, COATED ORAL at 08:19

## 2018-06-13 RX ADMIN — AMLODIPINE BESYLATE 5 MG: 5 TABLET ORAL at 12:25

## 2018-06-13 RX ADMIN — CARVEDILOL 6.25 MG: 3.12 TABLET, FILM COATED ORAL at 08:19

## 2018-06-13 RX ADMIN — AMITRIPTYLINE HYDROCHLORIDE 75 MG: 50 TABLET, FILM COATED ORAL at 21:38

## 2018-06-13 RX ADMIN — TAMSULOSIN HYDROCHLORIDE 0.4 MG: 0.4 CAPSULE ORAL at 17:10

## 2018-06-13 RX ADMIN — TORSEMIDE 40 MG: 20 TABLET ORAL at 17:10

## 2018-06-13 RX ADMIN — INSULIN GLARGINE 10 UNITS: 100 INJECTION, SOLUTION SUBCUTANEOUS at 21:37

## 2018-06-13 RX ADMIN — HEPARIN SODIUM 5000 UNITS: 5000 INJECTION, SOLUTION INTRAVENOUS; SUBCUTANEOUS at 21:38

## 2018-06-13 RX ADMIN — FLUTICASONE PROPIONATE AND SALMETEROL 1 PUFF: 50; 250 POWDER RESPIRATORY (INHALATION) at 08:26

## 2018-06-13 NOTE — PROGRESS NOTES
Largest scrotal support available from [address] [place] [address] at this time is an XL--too small for this pt  Continue instead to elevate scrotum with rolled towels and/or bath blankets

## 2018-06-13 NOTE — ASSESSMENT & PLAN NOTE
· Completed 10 days of IV Ancef  · Keep scrotum elevated   Sling not large enough, so will use towels for support while in bed  · Pain control  · Was seen by Urology

## 2018-06-13 NOTE — PROGRESS NOTES
Progress Note - Beni Langston 1960, 56 y o  male MRN: [id number]    Unit/Bed#: E2 -01 Encounter: [id number]    Primary Care Provider: Rosario Treviño DO   Date and time admitted to hospital: 6/1/2018  4:39 PM        * Cellulitis of scrotum   Assessment & Plan    · Completed 10 days of IV Ancef  · Keep scrotum elevated  Sling not large enough, so will use towels for support while in bed  · Pain control  · Was seen by Urology        DILCIA (acute kidney injury) ([facility] )   Assessment & Plan    · Suspect DILCIA on CKD - baseline unknown  · Nephrology input appreciated  · Nephrotic range proteinuria noted suspect secondary to DM  · Workup in progress - SPEP/UPEP pending        Acute on chronic diastolic congestive heart failure (HCC)   Assessment & Plan    · Off IV lasix infusion per nephrology  · Oral diuretics ordered  · Weight down about 10kg since admission  · Grade 2 diastolic dysfunction on echo        Diabetes ([facility] )   Assessment & Plan    · Continue current insulin orders        Acute respiratory failure with hypoxia (HCC)   Assessment & Plan    · Secondary to volume overload  · Continue supplemental O2        Hypertension   Assessment & Plan    · Continue amlodipine and carvedilol  · Consider ACEI when kidney function allows        Current smoker   Assessment & Plan    · Nicotine patch         Anemia   Assessment & Plan    · Likely chronic disease and iron insufficiency  · s/p IV Venofer          VTE Pharmacologic Prophylaxis:   Pharmacologic: Heparin  Mechanical VTE Prophylaxis in Place: Yes    Patient Centered Rounds: I have performed bedside rounds with nursing staff today  Discussions with Specialists or Other Care Team Provider: case management    Education and Discussions with Family / Patient: patient    Time Spent for Care: 30 minutes  More than 50% of total time spent on counseling and coordination of care as described above      Current Length of Stay: 12 day(s)    Current Patient Status: Inpatient Certification Statement: The patient will continue to require additional inpatient hospital stay due to requires placement    Discharge Plan: Await insurance authorization for rehab placement    Code Status: Level 1 - Full Code      Subjective:   Scrotum is sore  Nursing reports patient had to be lowered to the ground when working with PT today  Reports he was having difficulty following cues  Patient reports no injury    Objective:     Vitals:   Temp (24hrs), Av 2 °F (36 8 °C), Min:97 6 °F (36 4 °C), Max:98 8 °F (37 1 °C)    HR:  [85] 85  Resp:  [16-18] 16  BP: (152-154)/(72-80) 152/80  SpO2:  [90 %-92 %] 92 %  Body mass index is 41 47 kg/m²  Input and Output Summary (last 24 hours): Intake/Output Summary (Last 24 hours) at 18 1502  Last data filed at 18 1225   Gross per 24 hour   Intake              780 ml   Output             2781 ml   Net            -2001 ml       Physical Exam:     Physical Exam   Constitutional: He is oriented to person, place, and time  He appears well-developed  No distress  HENT:   Head: Normocephalic and atraumatic  Neck: Normal range of motion  Neck supple  Cardiovascular: Normal rate and regular rhythm  No murmur heard  Pulmonary/Chest: Effort normal and breath sounds normal  No respiratory distress  He has no wheezes  He has no rales  Abdominal: Soft  Bowel sounds are normal  He exhibits no distension  Genitourinary:   Genitourinary Comments: scrotum red, edematous   Musculoskeletal: He exhibits edema  Neurological: He is alert and oriented to person, place, and time  No cranial nerve deficit  Skin: Skin is warm and dry  No rash noted  Psychiatric: He has a normal mood and affect         Additional Data:     Labs:      Results from last 7 days  Lab Units 18  0459 18  0512   WBC Thousand/uL 4 60 5 37   HEMOGLOBIN g/dL 7 6* 7 5*   HEMATOCRIT % 26 3* 26 0*   PLATELETS Thousands/uL 253 208   NEUTROS PCT %  --  61   LYMPHS PCT %  -- 21   MONOS PCT %  --  16*   EOS PCT %  --  2       Results from last 7 days  Lab Units 06/13/18  0512   SODIUM mmol/L 138   POTASSIUM mmol/L 4 3   CHLORIDE mmol/L 99*   CO2 mmol/L 34*   BUN mg/dL 51*   CREATININE mg/dL 2 36*   CALCIUM mg/dL 8 7   TOTAL PROTEIN g/dL 6 3*   BILIRUBIN TOTAL mg/dL 0 22   ALK PHOS U/L 122*   ALT U/L 7*   AST U/L 24   GLUCOSE RANDOM mg/dL 115           Results from last 7 days  Lab Units 06/13/18  1253 06/13/18  1140 06/13/18  0727 06/12/18  2059 06/12/18  1612 06/12/18  1112 06/12/18  0717 06/11/18  2041 06/11/18  1601 06/11/18  1054 06/11/18  0707 06/10/18  2104   POC GLUCOSE mg/dl 147* 119 96 144* 131 129 143* 144* 139 163* 139 155*             * I Have Reviewed All Lab Data Listed Above  * Additional Pertinent Lab Tests Reviewed:  All Labs Within Last 24 Hours Reviewed    Imaging:    Imaging Reports Reviewed Today Include: none  Imaging Personally Reviewed by Myself Includes:  none    Recent Cultures (last 7 days):           Last 24 Hours Medication List:     Current Facility-Administered Medications:  acetaminophen 650 mg Oral Q6H PRN Jonna Raya PA-C   amitriptyline 75 mg Oral HS Jonna Raya PA-C   amLODIPine 5 mg Oral Daily Leeann Rosenthal,    aspirin 81 mg Oral Daily Jonna Raya PA-C   atorvastatin 40 mg Oral Daily With [employer] RISA Raya PA-C   bisacodyl 10 mg Rectal Daily PRN Peter Chahal MD   carvedilol 6 25 mg Oral BID With Meals Lenard Ramos DO   dextrose 25 mL Intravenous Once Peter Chahal MD   ferrous sulfate 325 mg Oral BID With Meals Peter Chahal MD   fluticasone-salmeterol 1 puff Inhalation Q12H [facility] Jonna Raya PA-C   gabapentin 900 mg Oral TID Jonna Raya PA-C   heparin (porcine) 5,000 Units Subcutaneous Q8H [facility] Jonna Raya PA-C   insulin glargine 10 Units Subcutaneous HS Peter Chahal MD   insulin lispro 2-12 Units Subcutaneous 4x Daily (AC & HS) Jonna Raya PA-C   nicotine 1 patch Transdermal Daily Jonna Raya PA-C   ondansetron 4 mg Intravenous Q6H PRN Jonna Raya PA-C   oxyCODONE 5 mg Oral Q4H PRN Jonna Raya PA-C   polyethylene glycol 17 g Oral Daily Jonna Raya PA-C   senna-docusate sodium 1 tablet Oral BID PRN Anabella Cantor PA-C   tamsulosin 0 4 mg Oral Daily With Ilsa Paulino MD   torsemide 40 mg Oral BID (diuretic) Lani Bynum DO        Today, Patient Was Seen By: Suzi Mello PA-C    ** Please Note: Dictation voice to text software may have been used in the creation of this document   **

## 2018-06-13 NOTE — PLAN OF CARE
Problem: OCCUPATIONAL THERAPY ADULT  Goal: Performs self-care activities at highest level of function for planned discharge setting  See evaluation for individualized goals  Treatment Interventions: ADL retraining, Functional transfer training, UE strengthening/ROM, Endurance training, Patient/family training, Equipment evaluation/education, Compensatory technique education, Continued evaluation, Energy conservation, Activityengagement          See flowsheet documentation for full assessment, interventions and recommendations  Outcome: Progressing  Limitation: Decreased ADL status, Decreased UE strength, Decreased UE ROM, Decreased Safe judgement during ADL, Decreased endurance, Decreased cognition, Decreased self-care trans, Decreased high-level ADLs  Prognosis: Fair    Pt is a 56 y o  male seen for OT evaluation s/p adm to [facility] on 6/1/2018 w/ Cellulitis of scrotum   Comorbidities affecting pts functional performance include a significant PMH of DM, HTN, and Neuropathy  Pt with active OT orders  Pt lives alone in a one level apt w/ 8 [employer] apt building and additional FOS to 2nd floor apt  Pt reports local friend that checks in daily and is able to assist as needed  At baseline, pt was I w/ ADLs, IADLs, and functional mobility/transfers w/o use of DME/AD, (+) , and reports 1 fall PTA  Upon evaluation, pt currently requires Max A for LB ADLs, Mod A for toileting, Supervision for UB ADLs, and Min A of 2 for functional mobility/transfers 2* the following deficits impacting occupational performance: weakness, decreased strength, decreased balance, decreased tolerance, impaired problem solving, decreased safety awareness and increased pain   These impairments, as well at pts steps to enter environment, limited home support, difficulty performing ADLS, difficulty performing IADLS  and limited insight into deficits limit pts ability to safely engage in all baseline areas of occupation, including grooming, bathing, dressing, toileting, functional mobility/transfers, community mobility, laundry , house maintenance, meal prep, cleaning, social participation  and leisure activities   Pt scored overall 40/100 on the Barthel Index  Based on the aforementioned OT evaluation, functional performance deficits, and assessments, pt has been identified as a high complexity evaluation  Pt to continue to benefit from continued acute OT services during hospital stay to address defined deficits and to maximize level of functional independence in the following Occupational Performance areas: grooming, bathing/shower, toilet hygiene, dressing, socialization, health maintenance, functional mobility, community mobility, clothing management, cleaning, meal prep, household maintenance, social participation and transfers to common household surfaces  From OT standpoint, recommend STR upon D/C   OT will continue to follow pt 3-5x/wk to address the following goals to  w/in 10-14 days:     OT Discharge Recommendation: Short Term Rehab  OT - OK to Discharge: Yes (when medically cleared to STR; no to home at this time)

## 2018-06-13 NOTE — ASSESSMENT & PLAN NOTE
· Off IV lasix infusion per nephrology  · Oral diuretics ordered  · Weight down about 10kg since admission  · Grade 2 diastolic dysfunction on echo

## 2018-06-13 NOTE — OCCUPATIONAL THERAPY NOTE
[address] Evaluation     Patient Name: Josh LEPE Date: 6/13/2018  Problem List  Patient Active Problem List   Diagnosis    Cellulitis of scrotum    Hydrocele    [facility]    Constipation    Diabetes ([facility] )    Hypertension    Current smoker    At risk for noncompliance    Anemia     Past Medical History  Past Medical History:   Diagnosis Date    [facility]     Hyperlipidemia     Hypertension     Neuropathy      Past Surgical History  History reviewed  No pertinent surgical history  06/13/18 1120   Note Type   Note type Eval/Treat   Restrictions/Precautions   Weight Bearing Precautions Per Order No   Other Precautions Cognitive; Chair Alarm; Bed Alarm; Fall Risk;Pain   Pain Assessment   Pain Assessment 0-10   Pain Score 7   Pain Type Acute pain   Pain Location Scrotum   Pain Orientation Bilateral   Pain Descriptors Aching   Pain Frequency Constant/continuous   Pain Onset Ongoing   Clinical Progression Gradually improving   Effect of Pain on Daily Activities Increased pain w/ activity   Patient's Stated Pain Goal No pain   Hospital Pain Intervention(s) Repositioned; Ambulation/increased activity; Emotional support   Response to Interventions Tolerated   Home Living   Type of [address] One level;Stairs to enter with rails  (8 [address] apt building; additonal FOS to 2nd floor apt)   Bathroom Shower/Tub Tub/shower unit   Bathroom Toilet Standard   Bathroom Equipment Other (Comment)  (none per pt report)   [address] Other (Comment)  (none per pt report)   Additional Comments Pt lives alone in a one level apt w/ 8 [address] apt building and additional FOS to 2nd floor apt  Pt reports local friend that checks in daily and is able to assist as needed      Prior Function   Level of [place] Independent with ADLs and functional mobility   Lives With Alone   Receives Help From Friend(s)   ADL Assistance Independent   IADLs Independent   Falls in the last 6 months 1 to 4  (1 per pt report)   Vocational Unemployed   Comments At baseline, pt was I w/ ADLs, IADLs, and functional mobility/transfers w/o use of DME/AD, (+) , and reports 1 fall PTA  Lifestyle   Autonomy At baseline, pt was I w/ ADLs, IADLs, and functional mobility/transfers w/o use of DME/AD, (+) , and reports 1 fall PTA  Reciprocal Relationships Lives alone; Supportive friends   Service to Others Unemployed   Intrinsic Gratification Watching TV   Psychosocial   Psychosocial (WDL) WDL   ADL   Eating Assistance 7  Independent   Grooming Assistance 5  Supervision/Setup   UB Bathing Assistance 5  Supervision/Setup   LB Bathing Assistance 2  Maximal Assistance   UB Dressing Assistance 5  Supervision/Setup   LB Dressing Assistance 2  Maximal Assistance   Toileting Assistance  3  Moderate Assistance   Bed Mobility   Supine to Sit Unable to assess  (Pt seated OOB in chair at start/end of session)   Sit to Supine Unable to assess  (Pt seated OOB in chair at start/end of session)   Additional Comments Pt seated OOB in chair at end of session w/ call bell and phone within reach  All needs met and pt reports no further questions for OT at this time   Transfers   Sit to Stand 4  Minimal assistance   Additional items Assist x 2;Armrests; Increased time required; Impulsive;Verbal cues   Stand to Sit 4  Minimal assistance   Additional items Assist x 2;Armrests; Increased time required;Verbal cues; Impulsive   Additional Comments Cues for safety, technique, and hand placement 2* increased impulsivity and decreased safety awareness demonstrated by pt  Functional Mobility   Functional Mobility 4  Minimal assistance   Additional Comments Assist x2; Pt just walked approx  50' with PT with use of RW and Min Ax1 prior OT entering room for initial evaluation  Pt seated OOB in chair upon entering room   After 5 minute seated rest break, pt instructed to stand and take 2 steps forward  Pt then impulsively took approx  4 quick steps forward with Min A of 2 and lost balance towards Right on 4th step  PT and OT braced pt towards bed surface located on pt's right and instructed pt to push BUEs through RW towards bed  Pt then impulsively lifted BUEs off of RW, resulting in pt's bottom sliding off of EOB  PT and OT then slowly lowered pt to floor to prevent injury to pt and themselves  Pt reported no onset of acute pain resulting from lowering to floor  Call [place] pushed and RN Jacquelin Fischer notified  Additional items Rolling walker   Balance   Static Sitting Fair   Dynamic Sitting Fair -   Static Standing Poor +   Dynamic Standing Poor   Ambulatory Poor   Activity Tolerance   Activity Tolerance Patient limited by pain; Patient limited by fatigue   Nurse Made Aware Pt appropriate to be seen per nursing   RUE Assessment   RUE Assessment WFL   RUE Strength   RUE Overall Strength Within Functional Limits - able to perform ADL tasks with strength  (3+/5)   LUE Assessment   LUE Assessment X  (decreased shldr flex, elbow-distal=WFL)   LUE Overall AROM   L Shoulder Flexion approx   90*   LUE Strength   LUE Overall Strength Within Functional Limits - able to perform ADL tasks with strength  (3+/5 in available range)   Hand Function   Gross Motor Coordination Functional   Fine Motor Coordination Functional   Sensation   Light Touch No apparent deficits   Sharp/Dull No apparent deficits   Vision-Basic Assessment   Current Vision Wears glasses all the time   Vision - Complex Assessment   Ocular Range of Motion WFL   Acuity Able to read employee name badge without difficulty   Perception   Inattention/Neglect Appears intact   Cognition   Overall Cognitive Status Impaired   Arousal/Participation Alert   Attention Attends with cues to redirect   Orientation Level Oriented X4   Memory Decreased recall of precautions   Following Commands Follows one step commands with increased time or repetition Assessment   Limitation Decreased ADL status; Decreased UE strength;Decreased UE ROM; Decreased Safe judgement during ADL;Decreased endurance;Decreased cognition;Decreased self-care trans;Decreased high-level ADLs   Prognosis Fair   Assessment Pt is a 56 y o  male seen for OT evaluation s/p adm to [facility] on 6/1/2018 w/ Cellulitis of scrotum   Comorbidities affecting pts functional performance include a significant PMH of DM, HTN, and Neuropathy  Pt with active OT orders  Pt lives alone in a one level apt w/ 8 [employer] apt building and additional FOS to 2nd floor apt  Pt reports local friend that checks in daily and is able to assist as needed  At baseline, pt was I w/ ADLs, IADLs, and functional mobility/transfers w/o use of DME/AD, (+) , and reports 1 fall PTA  Upon evaluation, pt currently requires Max A for LB ADLs, Mod A for toileting, Supervision for UB ADLs, and Min A of 2 for functional mobility/transfers 2* the following deficits impacting occupational performance: weakness, decreased strength, decreased balance, decreased tolerance, impaired problem solving, decreased safety awareness and increased pain  These impairments, as well at pts steps to enter environment, limited home support, difficulty performing ADLS, difficulty performing IADLS  and limited insight into deficits limit pts ability to safely engage in all baseline areas of occupation, including grooming, bathing, dressing, toileting, functional mobility/transfers, community mobility, laundry , house maintenance, meal prep, cleaning, social participation  and leisure activities   Pt scored overall 40/100 on the Barthel Index  Based on the aforementioned OT evaluation, functional performance deficits, and assessments, pt has been identified as a high complexity evaluation   Pt to continue to benefit from continued acute OT services during hospital stay to address defined deficits and to maximize level of functional independence in the following Occupational Performance areas: grooming, bathing/shower, toilet hygiene, dressing, socialization, health maintenance, functional mobility, community mobility, clothing management, cleaning, meal prep, household maintenance, social participation and transfers to common household surfaces  From OT standpoint, recommend STR upon D/C  OT will continue to follow pt 3-5x/wk to address the following goals to  w/in 10-14 days:   Goals   Patient Goals to get stronger   LTG Time Frame 10-14   Long Term Goal Please refer to LTGs listed below   Plan   Treatment Interventions ADL retraining;Functional transfer training;UE strengthening/ROM; Endurance training;Patient/family training;Equipment evaluation/education; Compensatory technique education;Continued evaluation; Energy conservation; Activityengagement   Goal Expiration Date 18   Treatment Day 1   OT Frequency 3-5x/wk   Additional Treatment Session   Start Time 1100   End Time 1120   Treatment Assessment Pt seen for additional treatment session  Pt seated comfortably on floor at start of treatment session (please see above notes regarding OT evaluation and pt being slowly lowered to floor surface)  Pt reported no onset of acute pain resulting from lowering to floor  Call roberto carlos posadased and RAMIREZ Gunter and Patient Care Manager of [facility], notified  Attempted to find [employer] and [employer], however Lary Dale and Harpreet Lewis reported difficulty finding appropriate however janie Pompa* only one working set up in hospital  Pt then assisted to his knees  From kneeling position, pt able to pull himself up with use of bed rails with assist of PT Artis Freeman, nursing student and Dr Toña Diaz, and myself  Pt required step by step instructions for transfer with tactile and verbal cueing  Once seated in chair, pt again reported no new onset of pain  Pt reported feeling comfortable in chair position   Pt then requested to remain seated upright in chair at end of session  Pt seated OOB in chair at end of session w/ call bell and phone within reach  All needs met and pt reports no further questions for OT at this time  OT to continue to follow pt on caseload  For future sessions, recommend use of RW with Ax2 and additional person for chair follow  Additionally, recommend chair and bed alarm  OT continues to recommend STR upon D/C      Additional Treatment Day 1   Recommendation   OT Discharge Recommendation Short Term Rehab   OT - OK to Discharge Yes  (when medically cleared to STR; no to home at this time)   Barthel Index   Feeding 10   Bathing 0   Grooming Score 5   Dressing Score 5   Bladder Score 5   Bowels Score 5   Toilet Use Score 5   Transfers (Bed/Chair) Score 5   Mobility (Level Surface) Score 0   Stairs Score 0   Barthel Index Score 40   Modified Geremias Scale   Modified [place] Scale 4       GOALS    1) Pt will improve activity tolerance to G for min 45 min txment sessions    2) Pt will complete bed mobility at a Mod I level w/ G balance/safety demonstrated     3) Pt will complete UB/LB dressing/self care w/ mod I using adaptive device and DME as needed    4) Pt will complete bathing w/ Mod I w/ use of AE and DME as needed    5) Pt will complete toileting w/ mod I w/ G hygiene/thoroughness using DME as needed    6) Pt will improve functional transfers to Mod I on/off all surfaces using DME as needed w/ G balance/safety     7) Pt will improve functional mobility during ADL/IADL/leisure tasks to Mod I using DME as needed w/ G balance/safety     8) Pt will participate in simulated IADL management task to increase independence to Mod I w/ G safety and endurance    9) Pt will engage in ongoing cognitive assessment w/ G participation w/ mod I to assist w/ safe d/c planning/recommendations    10) Pt will demonstrate G carryover of pt/caregiver education and training as appropriate w/ mod I w/o cues w/ good tolerance    11) Pt will demonstrate 100% carryover of energy conservation techniques w/ mod I t/o functional I/ADL/leisure tasks w/o cues s/p skilled education     12) Pt will increase UE strength by 1 MM grade to increase independence in ADLs and transfers      Nicki November, OTR/L

## 2018-06-13 NOTE — PLAN OF CARE
Problem: PHYSICAL THERAPY ADULT  Goal: Performs mobility at highest level of function for planned discharge setting  See evaluation for individualized goals  Treatment/Interventions: Functional transfer training, LE strengthening/ROM, Therapeutic exercise, Endurance training, Cognitive reorientation, Patient/family training, Equipment eval/education, Bed mobility, Gait training, Spoke to nursing  Equipment Recommended:  (defer at this time)       See flowsheet documentation for full assessment, interventions and recommendations  Outcome: Progressing  Prognosis: Fair  Problem List: Decreased strength, Decreased endurance, Impaired balance, Decreased mobility, Decreased coordination, Decreased cognition, Decreased safety awareness, Obesity, Decreased skin integrity, Pain  Assessment: (S) Pt demonstrated improvements in gait, strength, and functional mobility  Pt agreeable to participate and per nursing has been an assistx1 OOB and to the bathroom  Pt has had multiple recent falls and can be impulsive  Pt demonstrated sit<>stand from bedside chair w/ minAx1 w/ RW and gait training 48 ft minAx1 w/ RW  Pt needing increased VCs for safety d/t impulsitivity  Pt provided with 5 minute seated rest break post gait  Pt performed seated therex x10 reps BLE (long arc quads, ankle pumps, and seated marching  Once therex completed  OT arrived for evaluation  Pt performed additional sit>stand minAx1 w/ RW from chair  Pt needing increased cueing d/t impulsitivity  OT instructed patient to take 2 steps forward  PT stayed as aide just to ensure safety  Pt impulsively took 4 quick steps forward  On 4th step pt lost balance to the R  PT and OT braced towards bed surface located on the R and instructed pt to push using BUE and BLE towards bed  Pt impulsively let go of walker and bottom slid of EOB  PT and OT slowly lowered patient to floor to prevent injury to patient and themselves   Pt denied any new onset of pain just continuing to report pain in scrotum/testicles  Marcy Mckeon RN and Sawyer Mcclain Patient Care Manager E2 notified  Attempted to find hover janie and hover mat  Paris Gonzales and Leopoldo Mocha having difficulty finding appropriate hover hack d/t only one working set up in hospital  Pt able to be assist to his knees and pull himself up by pulling on bed railings and sit his bottom in chair  PT (Steff Rubio), OT (Ricco Douglas), Dr Rene Moreno, ROSA MARIAA, and nursing student all present in room to assist patient back into chair  Pt needing step by step sequencing and encouragement from PT/OT  Once seated in chair pt expressing desired to remain seated in chair  Pt left in bedside chair with all needs in reach, call bell and phone in hand, RN Marcy Mckeon aware of patient status, and chair alarm armed  Barriers to Discharge: Inaccessible home environment  Barriers to Discharge Comments: 2 FOS to enter  Recommendation: Short-term skilled PT     PT - OK to Discharge: Yes (to rehab once medically stable)    See flowsheet documentation for full assessment         Comments: Steff Rubio, PT,DPT

## 2018-06-13 NOTE — PROGRESS NOTES
NEPHROLOGY PROGRESS NOTE   Ivana Orta 56 y o  male MRN: [id number]  Unit/Bed#: E2 -01 Encounter: [id number]  Reason for Consult: DILCIA  ASSESSMENT/PLAN:  1  Acute kidney injury, likely secondary to volume overload   2  Likely chronic kidney disease, question previous baseline, noted nephrotic range proteinuria  3  Nephrotic range proteinuria, likely secondary to longstanding diabetes as well as obesity  , serum electrophoresis/urine electrophoresis without monoclonal gammopathy, free light chain assay abnormal ratio 2 42,   4  Diastolic dysfunction, ejection fraction of 66%, grade 2 diastolic dysfunction  5  Volume overload with associated scrotal edema, weight decreasing  6  Anemia, status post Venofer, low iron saturation  7  Hypertension, overall stable  8  Diabetes, poorly controlled    PLAN:  · Overall renal function seems to have stable eyes, creatinine 2 3 today  · Resume diuresis, torsemide 40 mg b i d  · Volume status slowly improving, weight is down 10 kg from presentation weight  · Consider ACE-inhibitor or angiotensin receptor blocker as an outpatient  · At 5 mg Norvasc, blood pressure suboptimal    SUBJECTIVE:  Seen and examined  Patient awake alert  Still complains of mild shortness of breath expression with exertion  Also complains of persistent abdominal bloating and swelling    Weight overall is down approximately 10 kg    Review of Systems    OBJECTIVE:  Current Weight: Weight - Scale: 131 kg (289 lb 0 4 oz)  Vitals:    06/12/18 2311 06/13/18 0015 06/13/18 0553 06/13/18 0720   BP: 154/72   152/80   BP Location: Right arm   Right arm   Pulse: 85   85   Resp: 18   16   Temp: 97 6 °F (36 4 °C)   98 8 °F (37 1 °C)   TempSrc: Temporal   Temporal   SpO2: 90% 92%  92%   Weight:   131 kg (289 lb 0 4 oz)    Height:           Intake/Output Summary (Last 24 hours) at 06/13/18 1022  Last data filed at 06/13/18 1011   Gross per 24 hour   Intake          1031 63 ml   Output             3031 ml   Net -1999 37 ml       Physical Exam   Constitutional: No distress  HENT:   Head: Normocephalic  Eyes: No scleral icterus  Neck: Neck supple  Cardiovascular: Normal rate and regular rhythm  Pulmonary/Chest: Effort normal and breath sounds normal    Abdominal: Soft  He exhibits distension  There is no tenderness  Musculoskeletal: He exhibits edema (Decreasing edema, 2+)  Neurological: He is alert  Skin: Skin is warm and dry  Psychiatric: He has a normal mood and affect         Medications:    Current Facility-Administered Medications:     acetaminophen (TYLENOL) tablet 650 mg, 650 mg, Oral, Q6H PRN, Jonna Raya PA-C, 650 mg at 06/04/18 1936    amitriptyline (ELAVIL) tablet 75 mg, 75 mg, Oral, HS, Jonna Raya PA-C, 75 mg at 06/12/18 2136    aspirin (ECOTRIN LOW STRENGTH) EC tablet 81 mg, 81 mg, Oral, Daily, Jonna Raya PA-C, 81 mg at 06/13/18 0819    atorvastatin (LIPITOR) tablet 40 mg, 40 mg, Oral, Daily With Dinner, Pippa Calderon PA-C, 40 mg at 06/12/18 1642    bisacodyl (DULCOLAX) rectal suppository 10 mg, 10 mg, Rectal, Daily PRN, Yariel Caldwell MD, 10 mg at 06/07/18 1942    carvedilol (COREG) tablet 6 25 mg, 6 25 mg, Oral, BID With Meals, [facility], 6 25 mg at 06/13/18 0819    dextrose 50 % IV solution 25 mL, 25 mL, Intravenous, Once, Yariel Caldwell MD, Stopped at 06/09/18 1149    ferrous sulfate tablet 325 mg, 325 mg, Oral, BID With Meals, Yariel Caldwell MD, 325 mg at 06/13/18 0820    fluticasone-salmeterol (ADVAIR) 250-50 mcg/dose inhaler 1 puff, 1 puff, Inhalation, Q12H [facility], Jonna Raya PA-C, 1 puff at 06/13/18 0826    gabapentin (NEURONTIN) capsule 900 mg, 900 mg, Oral, TID, Jonna Raya PA-C, 900 mg at 06/13/18 0819    heparin (porcine) subcutaneous injection 5,000 Units, 5,000 Units, Subcutaneous, Q8H [facility], 5,000 Units at 06/13/18 0622 **AND** [CANCELED] Platelet count, , , Once, Jonna Raya PA-C    insulin glargine (LANTUS) subcutaneous injection 10 Units 0 1 mL, 10 Units, Subcutaneous, HS, Johnson Lind MD, 10 Units at 06/12/18 2133    insulin lispro (HumaLOG) 100 units/mL subcutaneous injection 2-12 Units, 2-12 Units, Subcutaneous, 4x Daily (AC & HS), 2 Units at 06/11/18 1127 **AND** Fingerstick Glucose (POCT), , , 4x Daily AC and at bedtime, Jonna Raya PA-C    nicotine (NICODERM CQ) 14 mg/24hr TD 24 hr patch 1 patch, 1 patch, Transdermal, Daily, Jonna Raya PA-C, 1 patch at 06/13/18 0821    ondansetron (ZOFRAN) injection 4 mg, 4 mg, Intravenous, Q6H PRN, Jonna Raya PA-C    oxyCODONE (ROXICODONE) IR tablet 5 mg, 5 mg, Oral, Q4H PRN, Jonna Raya PA-C, 5 mg at 06/13/18 0346    polyethylene glycol (MIRALAX) packet 17 g, 17 g, Oral, Daily, Jonna Raya PA-C, 17 g at 06/13/18 0819    senna-docusate sodium (SENOKOT S) 8 6-50 mg per tablet 1 tablet, 1 tablet, Oral, BID PRN, Janette Martinez PA-C    tamsulosin (FLOMAX) capsule 0 4 mg, 0 4 mg, Oral, Daily With Brandon Valencia MD, 0 4 mg at 06/12/18 1642    torsemide (DEMADEX) tablet 40 mg, 40 mg, Oral, BID (diuretic), Donald Booker DO    Laboratory Results:    Results from last 7 days  Lab Units 06/13/18  0512 06/12/18  0459 06/11/18  0512 06/10/18  0601 06/09/18  0635 06/08/18  0457 06/07/18  0449   WBC Thousand/uL  --  4 60 5 37 5 44 5 94 6 38 5 07   HEMOGLOBIN g/dL  --  7 6* 7 5* 7 4* 7 5* 7 8* 7 6*   HEMATOCRIT %  --  26 3* 26 0* 26 0* 25 8* 26 9* 26 3*   PLATELETS Thousands/uL  --  253 208 216 191 203 200   SODIUM mmol/L 138 137 137 136 137 138 139   POTASSIUM mmol/L 4 3 4 7 5 1 5 3 4 9 5 3 5 7*   CHLORIDE mmol/L 99* 99* 100 101 104 104 105   CO2 mmol/L 34* 32 28 28 27 28 27   BUN mg/dL 51* 53* 53* 48* 46* 43* 45*   CREATININE mg/dL 2 36* 2 48* 2 40* 2 32* 2 02* 2 01* 2 13*   CALCIUM mg/dL 8 7 9 1 8 8 8 4 8 8 8 3 8 2*   MAGNESIUM mg/dL  --  2 3  --   --   --   --   --    PHOSPHORUS mg/dL  --   --   --  5 7* --   --   --    TOTAL PROTEIN g/dL 6 3*  --   --  6 2*  --   --   --    GLUCOSE RANDOM mg/dL 115 137 124 146* 50* 104 110

## 2018-06-13 NOTE — ASSESSMENT & PLAN NOTE
· Suspect DILCIA on CKD - baseline unknown  · Nephrology input appreciated  · Nephrotic range proteinuria noted suspect secondary to DM  · Workup in progress - SPEP/UPEP pending

## 2018-06-14 ENCOUNTER — APPOINTMENT (INPATIENT)
Dept: RADIOLOGY | Facility: HOSPITAL | Age: 58
DRG: 501 | End: 2018-06-14
Payer: COMMERCIAL

## 2018-06-14 PROBLEM — E78.5 HYPERLIPIDEMIA: Status: ACTIVE | Noted: 2018-06-14

## 2018-06-14 PROBLEM — Z79.4 TYPE 2 DIABETES MELLITUS WITH KIDNEY COMPLICATION, WITH LONG-TERM CURRENT USE OF INSULIN (HCC): Status: ACTIVE | Noted: 2018-06-01

## 2018-06-14 PROBLEM — R80.9 PROTEINURIA: Status: ACTIVE | Noted: 2018-06-14

## 2018-06-14 PROBLEM — N40.0 BPH (BENIGN PROSTATIC HYPERPLASIA): Status: ACTIVE | Noted: 2018-06-14

## 2018-06-14 PROBLEM — R60.0 LOWER EXTREMITY EDEMA: Status: ACTIVE | Noted: 2018-06-14

## 2018-06-14 PROBLEM — E11.29 TYPE 2 DIABETES MELLITUS WITH KIDNEY COMPLICATION, WITH LONG-TERM CURRENT USE OF INSULIN (HCC): Status: ACTIVE | Noted: 2018-06-01

## 2018-06-14 PROBLEM — J96.01 ACUTE RESPIRATORY FAILURE WITH HYPOXIA (HCC): Status: RESOLVED | Noted: 2018-06-13 | Resolved: 2018-06-14

## 2018-06-14 LAB
ANION GAP SERPL CALCULATED.3IONS-SCNC: 6 MMOL/L (ref 4–13)
BUN SERPL-MCNC: 49 MG/DL (ref 5–25)
CALCIUM SERPL-MCNC: 8.8 MG/DL (ref 8.3–10.1)
CHLORIDE SERPL-SCNC: 98 MMOL/L (ref 100–108)
CO2 SERPL-SCNC: 35 MMOL/L (ref 21–32)
CREAT SERPL-MCNC: 2.34 MG/DL (ref 0.6–1.3)
GFR SERPL CREATININE-BSD FRML MDRD: 30 ML/MIN/1.73SQ M
GLUCOSE SERPL-MCNC: 130 MG/DL (ref 65–140)
GLUCOSE SERPL-MCNC: 138 MG/DL (ref 65–140)
GLUCOSE SERPL-MCNC: 140 MG/DL (ref 65–140)
GLUCOSE SERPL-MCNC: 153 MG/DL (ref 65–140)
GLUCOSE SERPL-MCNC: 156 MG/DL (ref 65–140)
GLUCOSE SERPL-MCNC: 170 MG/DL (ref 65–140)
POTASSIUM SERPL-SCNC: 4.2 MMOL/L (ref 3.5–5.3)
SODIUM SERPL-SCNC: 139 MMOL/L (ref 136–145)

## 2018-06-14 PROCEDURE — 82948 REAGENT STRIP/BLOOD GLUCOSE: CPT

## 2018-06-14 PROCEDURE — 71046 X-RAY EXAM CHEST 2 VIEWS: CPT

## 2018-06-14 PROCEDURE — 99232 SBSQ HOSP IP/OBS MODERATE 35: CPT | Performed by: INTERNAL MEDICINE

## 2018-06-14 PROCEDURE — 94640 AIRWAY INHALATION TREATMENT: CPT

## 2018-06-14 PROCEDURE — 80048 BASIC METABOLIC PNL TOTAL CA: CPT | Performed by: INTERNAL MEDICINE

## 2018-06-14 RX ORDER — LEVALBUTEROL 1.25 MG/.5ML
1.25 SOLUTION, CONCENTRATE RESPIRATORY (INHALATION)
Status: DISCONTINUED | OUTPATIENT
Start: 2018-06-14 | End: 2018-06-14

## 2018-06-14 RX ORDER — LEVALBUTEROL 1.25 MG/.5ML
1.25 SOLUTION, CONCENTRATE RESPIRATORY (INHALATION)
Status: DISCONTINUED | OUTPATIENT
Start: 2018-06-15 | End: 2018-06-16 | Stop reason: HOSPADM

## 2018-06-14 RX ADMIN — FLUTICASONE PROPIONATE AND SALMETEROL 1 PUFF: 50; 250 POWDER RESPIRATORY (INHALATION) at 20:29

## 2018-06-14 RX ADMIN — HEPARIN SODIUM 5000 UNITS: 5000 INJECTION, SOLUTION INTRAVENOUS; SUBCUTANEOUS at 21:42

## 2018-06-14 RX ADMIN — OXYCODONE HYDROCHLORIDE 5 MG: 5 TABLET ORAL at 20:29

## 2018-06-14 RX ADMIN — OXYCODONE HYDROCHLORIDE 5 MG: 5 TABLET ORAL at 09:19

## 2018-06-14 RX ADMIN — GABAPENTIN 900 MG: 300 CAPSULE ORAL at 08:53

## 2018-06-14 RX ADMIN — NICOTINE 1 PATCH: 14 PATCH TRANSDERMAL at 08:54

## 2018-06-14 RX ADMIN — AMITRIPTYLINE HYDROCHLORIDE 75 MG: 50 TABLET, FILM COATED ORAL at 21:41

## 2018-06-14 RX ADMIN — OXYCODONE HYDROCHLORIDE 5 MG: 5 TABLET ORAL at 14:48

## 2018-06-14 RX ADMIN — FERROUS SULFATE TAB 325 MG (65 MG ELEMENTAL FE) 325 MG: 325 (65 FE) TAB at 16:10

## 2018-06-14 RX ADMIN — GABAPENTIN 900 MG: 300 CAPSULE ORAL at 20:29

## 2018-06-14 RX ADMIN — FERROUS SULFATE TAB 325 MG (65 MG ELEMENTAL FE) 325 MG: 325 (65 FE) TAB at 08:53

## 2018-06-14 RX ADMIN — POLYETHYLENE GLYCOL 3350 17 G: 17 POWDER, FOR SOLUTION ORAL at 08:53

## 2018-06-14 RX ADMIN — ASPIRIN 81 MG: 81 TABLET, COATED ORAL at 08:53

## 2018-06-14 RX ADMIN — INSULIN GLARGINE 10 UNITS: 100 INJECTION, SOLUTION SUBCUTANEOUS at 21:42

## 2018-06-14 RX ADMIN — HEPARIN SODIUM 5000 UNITS: 5000 INJECTION, SOLUTION INTRAVENOUS; SUBCUTANEOUS at 14:43

## 2018-06-14 RX ADMIN — INSULIN LISPRO 2 UNITS: 100 INJECTION, SOLUTION INTRAVENOUS; SUBCUTANEOUS at 21:42

## 2018-06-14 RX ADMIN — TORSEMIDE 40 MG: 20 TABLET ORAL at 08:53

## 2018-06-14 RX ADMIN — IPRATROPIUM BROMIDE 0.5 MG: 0.5 SOLUTION RESPIRATORY (INHALATION) at 19:38

## 2018-06-14 RX ADMIN — FLUTICASONE PROPIONATE AND SALMETEROL 1 PUFF: 50; 250 POWDER RESPIRATORY (INHALATION) at 08:52

## 2018-06-14 RX ADMIN — LEVALBUTEROL HYDROCHLORIDE 1.25 MG: 1.25 SOLUTION, CONCENTRATE RESPIRATORY (INHALATION) at 19:38

## 2018-06-14 RX ADMIN — GABAPENTIN 900 MG: 300 CAPSULE ORAL at 16:10

## 2018-06-14 RX ADMIN — TAMSULOSIN HYDROCHLORIDE 0.4 MG: 0.4 CAPSULE ORAL at 16:11

## 2018-06-14 RX ADMIN — HEPARIN SODIUM 5000 UNITS: 5000 INJECTION, SOLUTION INTRAVENOUS; SUBCUTANEOUS at 05:02

## 2018-06-14 RX ADMIN — CARVEDILOL 6.25 MG: 3.12 TABLET, FILM COATED ORAL at 08:53

## 2018-06-14 RX ADMIN — INSULIN LISPRO 2 UNITS: 100 INJECTION, SOLUTION INTRAVENOUS; SUBCUTANEOUS at 11:18

## 2018-06-14 RX ADMIN — TORSEMIDE 40 MG: 20 TABLET ORAL at 16:11

## 2018-06-14 RX ADMIN — DESMOPRESSIN ACETATE 40 MG: 0.2 TABLET ORAL at 16:11

## 2018-06-14 RX ADMIN — CARVEDILOL 6.25 MG: 3.12 TABLET, FILM COATED ORAL at 16:11

## 2018-06-14 RX ADMIN — AMLODIPINE BESYLATE 5 MG: 5 TABLET ORAL at 08:53

## 2018-06-14 NOTE — PROGRESS NOTES
Progress Note - Delvis Bullard 56 y o  male MRN: [id number]    Unit/Bed#: E2 -01 Encounter: [id number]      Assessment/Plan:  1-acute kidney injury superimposed on chronic kidney disease stage 3:   patient has a baseline history of chronic kidney disease stage 3  His admission creatinine was 1 95    -patient was diuresed and had acute kidney injury  His creatinine worsened to 2 48  He was monitored by the nephrology service    -Lasix infusion held   -ACE-inhibitor held   -creatinine has plateaued at 2 3, which may be his new baseline    -started on torsemide 40 mg b i d      2-proteinuria:-patient has associated nephrotic range proteinuria:  Most likely secondary to longstanding diabetes  -SPEP/UPEP without monoclonal gammopathy   -hepatitis panel negative     3-acute/chronic diastolic congestive heart failure:   patient had acute/chronic diastolic congestive heart failure exacerbation  His most recent echocardiogram revealed a normal left ventricular ejection fraction, and grade 2 diastolic dysfunction  -patient was diuresed with a Lasix infusion    -Lungs clear    -has diuresed 14 kg since 06/08     4-scrotal edema/probable cellulitis[de-identified]   patient presented with scrotal edema   He was evaluated by the Urology service who recommended continuing elevation, and a course of IV antibiotics  -patient completed a ten-day course of Ancef    -continue diuresis   -continue elevation       5-bilateral lower extremity edema:  Discussed with patient elevation  Will order Myron stockings  Status post diuresis     6-iron deficiency anemia:   patient has a history of anemia of chronic disease, as well as iron deficiency anemia  Status post IV Venofer     7-acute hypoxic respiratory failure:  Patient is noted to be hypoxic  This felt to be secondary to his acute/chronic congestive heart failure exacerbation  He was diuresed and given supplemental oxygen      8-diabetes type 2:  Patient has a history of diabetes type 2, with long-term use of insulin, and diabetic nephropathy:    9-hyperlipidemia:  Continue statin    10-essential hypertension:  Blood pressure adequately but not at goal   Systolic blood pressure ranging 146 dash 161  Kyle Trujillo Continue medications and titrate as needed    -continue Norvasc 5 mg tablet once daily and Coreg 6 25 mg b i d     11-tobacco abuse:  Smoking cessation counseled    12-BPH:  Continue Flomax    13-anxiety/depression:  Continue amitriptyline    14-suspected COPD:  Patient's he smokes 1/2 packs per day for 41 years  He has decreased air movement  He notes dyspnea with exertion  Suspect underlying COPD   -start Xopenex/Atrovent nebulizer treatments and monitor response   -outpatient pulmonary function test referral    15-incidental finding:  Check hollow up chest x-ray to evaluate possible hilar density  16-deconditioning: Will need inpatient short-term rehab      VTE Pharmacologic Prophylaxis: Heparin  VTE Mechanical Prophylaxis: sequential compression device    Certification Statement: The patient will continue to require additional inpatient hospital stay due to need for further acute intervention for short-term rehab arrangements    Status: inpatient     ===================================================================    Subjective:  Patient relates that his lower extremity edema has improved  He notes his scrotal edema has not improved at all  He notes scrotal discomfort  He denies any pain anywhere else  He denies any headache, chest pain, back pain, abdominal pain or extremity pain  He denies any shortness of breath  He notes dyspnea with minimal exertion  He denies any chest pain  He denies any nausea, vomiting, diarrhea  He notes lightheadedness with standing  Physical Exam:   Temp:  [96 3 °F (35 7 °C)-98 4 °F (36 9 °C)] 96 8 °F (36 °C)  HR:  [82-86] 82  Resp:  [16-18] 18  BP: (146-166)/(67-76) 146/76    Gen:  Pleasant, non-tachypnic, non-dyspnic  Conversant  Heart: regular rate and rhythm, S1S2 present, no murmur, rub or gallop  Lungs:  Decreased air movement bilaterally  Coarse breath sounds, however no wheezes, crackles, rhonchi    No accessory muscle use or respiratory distress  Abd: soft, non-tender, non-distended  NABS, no guarding, rebound or peritoneal signs  Extremities: no clubbing, cyanosis  1+ edema bilateral lower extremities     2+pedal pulses bilaterally  Full range of motion  Neuro: awake, alert and oriented  Fluent speech  Moving all 4 extremities  Skin: warm and dry: no petechiae, purpura and rash  LABS:     Results from last 7 days  Lab Units 06/12/18  0459 06/11/18  0512 06/10/18  0601   WBC Thousand/uL 4 60 5 37 5 44   HEMOGLOBIN g/dL 7 6* 7 5* 7 4*   HEMATOCRIT % 26 3* 26 0* 26 0*   PLATELETS Thousands/uL 253 208 216       Results from last 7 days  Lab Units 06/14/18  0501 06/13/18  0512 06/12/18  0459   SODIUM mmol/L 139 138 137   POTASSIUM mmol/L 4 2 4 3 4 7   CHLORIDE mmol/L 98* 99* 99*   CO2 mmol/L 35* 34* 32   BUN mg/dL 49* 51* 53*   CREATININE mg/dL 2 34* 2 36* 2 48*   GLUCOSE RANDOM mg/dL 140 115 137   CALCIUM mg/dL 8 8 8 7 9 1       Hospital Data:  6/7:  Chest x-ray: Pulmonary vascular congestion with focal increased density overlying the left hilum   This may be vascular, particularly given changes of congestion, though the possibility of underlying lesion is not excluded   A follow-up PA and lateral radiograph is   recommended after diuresis    6/5:  Renal ultrasound:  Extremely limited/nearly nondiagnostic exam   No right hydronephrosis  Left kidney was not seen due to bowel gas artifact  Rodriguez Gueydan bladder    6/1:  Ultrasound scrotum/testicles  Severe scrotal soft tissue swelling/edema suggesting cellulitis  No drainable fluid collection identified    Small bilateral complex debris-containing hydroceles    ---------------------------------------------------------------------------------------------------------------  This note has been constructed using a voice recognition system

## 2018-06-14 NOTE — PROGRESS NOTES
NEPHROLOGY PROGRESS NOTE   Marcos Layton 56 y o  male MRN: [id number]  Unit/Bed#: E2 -01 Encounter: [id number]  Reason for Consult:  Acute kidney injury    ASSESSMENT/PLAN:  1  Acute kidney injury, likely secondary to volume overload   2  Likely chronic kidney disease, question previous baseline, noted nephrotic range proteinuria  3  Nephrotic range proteinuria, likely secondary to longstanding diabetes as well as obesity  , serum electrophoresis/urine electrophoresis without monoclonal gammopathy, free light chain assay abnormal ratio 2 42,   4  Diastolic dysfunction, ejection fraction of 68%, grade 2 diastolic dysfunction  5  Volume overload with associated scrotal edema, weight decreasing  6  Anemia, status post Venofer, low iron saturation  7  Hypertension, overall stable  8  Diabetes, poorly controlled    PLAN:  · Overall renal function seems to have plateaued at approximately 2 3, may represent new baseline  · Continue with current diuresis, has lost a significant amount of weight, nearly 14 kg since admission  · Blood pressure stable  · Avoid ACE-inhibitor or angiotensin receptor blocker at this time, will follow-up as an outpatient   · Phosphorus level improving    SUBJECTIVE:  Seen and examined  Patient still complains of significant scrotal and lower extremity swelling although over 20 lb weight loss since admission  No active chest pain or significant shortness of breath  Denies any abdominal pain      Review of Systems    OBJECTIVE:  Current Weight: Weight - Scale: 127 kg (279 lb 8 7 oz)  Vitals:    06/13/18 1538 06/13/18 2324 06/14/18 0600 06/14/18 0700   BP: 132/82 166/67  161/72   BP Location: Right arm Right arm  Left arm   Pulse: 88 85  86   Resp: 16 16  18   Temp: 98 °F (36 7 °C) 98 4 °F (36 9 °C)  (!) 96 3 °F (35 7 °C)   TempSrc: Tympanic Temporal  Tympanic   SpO2: 93% 91%  91%   Weight:   127 kg (279 lb 8 7 oz)    Height:           Intake/Output Summary (Last 24 hours) at 06/14/18 315 [facility] filed at 06/14/18 1211   Gross per 24 hour   Intake              240 ml   Output             3825 ml   Net            -3585 ml       Physical Exam   Constitutional: No distress  HENT:   Head: Normocephalic  Eyes: No scleral icterus  Neck: Neck supple  Cardiovascular: Normal rate and regular rhythm  Pulmonary/Chest: Effort normal and breath sounds normal    Abdominal: Soft  He exhibits distension  There is no tenderness  Genitourinary:   Genitourinary Comments: Scrotal swelling   Musculoskeletal: He exhibits edema (+1 +2)  Neurological: He is alert  Skin: Skin is warm and dry  Psychiatric: He has a normal mood and affect         Medications:    Current Facility-Administered Medications:     acetaminophen (TYLENOL) tablet 650 mg, 650 mg, Oral, Q6H PRN, Jonna Raya PA-C, 650 mg at 06/04/18 1936    amitriptyline (ELAVIL) tablet 75 mg, 75 mg, Oral, HS, Jonna Raya PA-C, 75 mg at 06/13/18 2138    amLODIPine (NORVASC) tablet 5 mg, 5 mg, Oral, Daily, Cherelle Rosenthal DO, 5 mg at 06/14/18 0853    aspirin (ECOTRIN LOW STRENGTH) EC tablet 81 mg, 81 mg, Oral, Daily, Jonna Raya PA-C, 81 mg at 06/14/18 0853    atorvastatin (LIPITOR) tablet 40 mg, 40 mg, Oral, Daily With Big Chantell Raya PA-C, 40 mg at 06/13/18 1710    bisacodyl (DULCOLAX) rectal suppository 10 mg, 10 mg, Rectal, Daily PRN, Peña Prince MD, 10 mg at 06/07/18 1942    carvedilol (COREG) tablet 6 25 mg, 6 25 mg, Oral, BID With Meals, Kd Oshea DO, 6 25 mg at 06/14/18 0853    dextrose 50 % IV solution 25 mL, 25 mL, Intravenous, Once, Peña Prince MD, Stopped at 06/09/18 1149    ferrous sulfate tablet 325 mg, 325 mg, Oral, BID With Meals, Peña Prince MD, 325 mg at 06/14/18 0853    fluticasone-salmeterol (ADVAIR) 250-50 mcg/dose inhaler 1 puff, 1 puff, Inhalation, Q12H [facility], Jonna Raya PA-C, 1 puff at 06/14/18 0852    gabapentin (NEURONTIN) capsule 900 mg, 900 mg, Oral, TID, Jonna Raya PA-C, 900 mg at 06/14/18 0853    heparin (porcine) subcutaneous injection 5,000 Units, 5,000 Units, Subcutaneous, Q8H [address], 5,000 Units at 06/14/18 0502 **AND** [CANCELED] Platelet count, , , Once, Jonna Raya PA-C    insulin glargine (LANTUS) subcutaneous injection 10 Units 0 1 mL, 10 Units, Subcutaneous, HS, Argenis Garcia MD, 10 Units at 06/13/18 2137    insulin lispro (HumaLOG) 100 units/mL subcutaneous injection 2-12 Units, 2-12 Units, Subcutaneous, 4x Daily (AC & HS), 2 Units at 06/14/18 1118 **AND** Fingerstick Glucose (POCT), , , 4x Daily AC and at bedtime, Jonna Raya PA-C    nicotine (NICODERM CQ) 14 mg/24hr TD 24 hr patch 1 patch, 1 patch, Transdermal, Daily, Jonna Raya PA-C, 1 patch at 06/14/18 0854    ondansetron (ZOFRAN) injection 4 mg, 4 mg, Intravenous, Q6H PRN, Jonna Raya PA-C    oxyCODONE (ROXICODONE) IR tablet 5 mg, 5 mg, Oral, Q4H PRN, Jonna Raya PA-C, 5 mg at 06/14/18 0919    polyethylene glycol (MIRALAX) packet 17 g, 17 g, Oral, Daily, Jonna Raya PA-C, 17 g at 06/14/18 0853    senna-docusate sodium (SENOKOT S) 8 6-50 mg per tablet 1 tablet, 1 tablet, Oral, BID PRN, Leila Van PA-C    tamsulosin (FLOMAX) capsule 0 4 mg, 0 4 mg, Oral, Daily With Umer Gillespie MD, 0 4 mg at 06/13/18 1710    torsemide (DEMADEX) tablet 40 mg, 40 mg, Oral, BID (diuretic), Araceli Rosenthal DO, 40 mg at 06/14/18 8317    Laboratory Results:    Results from last 7 days  Lab Units 06/14/18  0501 06/13/18  0512 06/12/18  0459 06/11/18  0512 06/10/18  0601 06/09/18  0635 06/08/18  0457   WBC Thousand/uL  --   --  4 60 5 37 5 44 5 94 6 38   HEMOGLOBIN g/dL  --   --  7 6* 7 5* 7 4* 7 5* 7 8*   HEMATOCRIT %  --   --  26 3* 26 0* 26 0* 25 8* 26 9*   PLATELETS Thousands/uL  --   --  253 208 216 191 203   SODIUM mmol/L 139 138 137 137 136 137 138   POTASSIUM mmol/L 4 2 4 3 4 7 5 1 5 3 4 9 5 3   CHLORIDE mmol/L 98* 99* 99* 100 101 104 104   CO2 mmol/L 35* 34* 32 28 28 27 28   BUN mg/dL 49* 51* 53* 53* 48* 46* 43*   CREATININE mg/dL 2 34* 2 36* 2 48* 2 40* 2 32* 2 02* 2 01*   CALCIUM mg/dL 8 8 8 7 9 1 8 8 8 4 8 8 8 3   MAGNESIUM mg/dL  --   --  2 3  --   --   --   --    PHOSPHORUS mg/dL  --  4 9*  --   --  5 7*  --   --    TOTAL PROTEIN g/dL  --  6 3*  --   --  6 2*  --   --    GLUCOSE RANDOM mg/dL 140 115 137 124 146* 50* 104

## 2018-06-15 ENCOUNTER — APPOINTMENT (INPATIENT)
Dept: RADIOLOGY | Facility: HOSPITAL | Age: 58
DRG: 501 | End: 2018-06-15
Payer: COMMERCIAL

## 2018-06-15 LAB
ANION GAP SERPL CALCULATED.3IONS-SCNC: 6 MMOL/L (ref 4–13)
BUN SERPL-MCNC: 48 MG/DL (ref 5–25)
CALCIUM SERPL-MCNC: 8.2 MG/DL (ref 8.3–10.1)
CHLORIDE SERPL-SCNC: 97 MMOL/L (ref 100–108)
CO2 SERPL-SCNC: 36 MMOL/L (ref 21–32)
CREAT SERPL-MCNC: 2.3 MG/DL (ref 0.6–1.3)
ERYTHROCYTE [DISTWIDTH] IN BLOOD BY AUTOMATED COUNT: 15.9 % (ref 11.6–15.1)
GFR SERPL CREATININE-BSD FRML MDRD: 31 ML/MIN/1.73SQ M
GLUCOSE SERPL-MCNC: 123 MG/DL (ref 65–140)
GLUCOSE SERPL-MCNC: 128 MG/DL (ref 65–140)
GLUCOSE SERPL-MCNC: 159 MG/DL (ref 65–140)
GLUCOSE SERPL-MCNC: 163 MG/DL (ref 65–140)
GLUCOSE SERPL-MCNC: 168 MG/DL (ref 65–140)
GLUCOSE SERPL-MCNC: 183 MG/DL (ref 65–140)
GLUCOSE SERPL-MCNC: 78 MG/DL (ref 65–140)
GLUCOSE SERPL-MCNC: 93 MG/DL (ref 65–140)
HCT VFR BLD AUTO: 25.1 % (ref 36.5–49.3)
HGB BLD-MCNC: 7.5 G/DL (ref 12–17)
MCH RBC QN AUTO: 28.1 PG (ref 26.8–34.3)
MCHC RBC AUTO-ENTMCNC: 29.9 G/DL (ref 31.4–37.4)
MCV RBC AUTO: 94 FL (ref 82–98)
PLATELET # BLD AUTO: 200 THOUSANDS/UL (ref 149–390)
PMV BLD AUTO: 9.6 FL (ref 8.9–12.7)
POTASSIUM SERPL-SCNC: 4.1 MMOL/L (ref 3.5–5.3)
RBC # BLD AUTO: 2.67 MILLION/UL (ref 3.88–5.62)
SODIUM SERPL-SCNC: 139 MMOL/L (ref 136–145)
WBC # BLD AUTO: 4.66 THOUSAND/UL (ref 4.31–10.16)

## 2018-06-15 PROCEDURE — 99232 SBSQ HOSP IP/OBS MODERATE 35: CPT | Performed by: INTERNAL MEDICINE

## 2018-06-15 PROCEDURE — 73030 X-RAY EXAM OF SHOULDER: CPT

## 2018-06-15 PROCEDURE — 85027 COMPLETE CBC AUTOMATED: CPT | Performed by: INTERNAL MEDICINE

## 2018-06-15 PROCEDURE — 94640 AIRWAY INHALATION TREATMENT: CPT

## 2018-06-15 PROCEDURE — 94760 N-INVAS EAR/PLS OXIMETRY 1: CPT

## 2018-06-15 PROCEDURE — 82948 REAGENT STRIP/BLOOD GLUCOSE: CPT

## 2018-06-15 PROCEDURE — 80048 BASIC METABOLIC PNL TOTAL CA: CPT | Performed by: INTERNAL MEDICINE

## 2018-06-15 RX ORDER — FUROSEMIDE 10 MG/ML
40 INJECTION INTRAMUSCULAR; INTRAVENOUS ONCE
Status: COMPLETED | OUTPATIENT
Start: 2018-06-15 | End: 2018-06-15

## 2018-06-15 RX ADMIN — TORSEMIDE 40 MG: 20 TABLET ORAL at 08:33

## 2018-06-15 RX ADMIN — HEPARIN SODIUM 5000 UNITS: 5000 INJECTION, SOLUTION INTRAVENOUS; SUBCUTANEOUS at 06:39

## 2018-06-15 RX ADMIN — LEVALBUTEROL HYDROCHLORIDE 1.25 MG: 1.25 SOLUTION, CONCENTRATE RESPIRATORY (INHALATION) at 19:17

## 2018-06-15 RX ADMIN — AMITRIPTYLINE HYDROCHLORIDE 75 MG: 50 TABLET, FILM COATED ORAL at 21:36

## 2018-06-15 RX ADMIN — DESMOPRESSIN ACETATE 40 MG: 0.2 TABLET ORAL at 17:11

## 2018-06-15 RX ADMIN — OXYCODONE HYDROCHLORIDE 5 MG: 5 TABLET ORAL at 02:25

## 2018-06-15 RX ADMIN — TORSEMIDE 40 MG: 20 TABLET ORAL at 17:11

## 2018-06-15 RX ADMIN — ASPIRIN 81 MG: 81 TABLET, COATED ORAL at 08:33

## 2018-06-15 RX ADMIN — FUROSEMIDE 40 MG: 10 INJECTION, SOLUTION INTRAMUSCULAR; INTRAVENOUS at 12:07

## 2018-06-15 RX ADMIN — FLUTICASONE PROPIONATE AND SALMETEROL 1 PUFF: 50; 250 POWDER RESPIRATORY (INHALATION) at 08:35

## 2018-06-15 RX ADMIN — INSULIN LISPRO 2 UNITS: 100 INJECTION, SOLUTION INTRAVENOUS; SUBCUTANEOUS at 11:49

## 2018-06-15 RX ADMIN — HEPARIN SODIUM 5000 UNITS: 5000 INJECTION, SOLUTION INTRAVENOUS; SUBCUTANEOUS at 14:24

## 2018-06-15 RX ADMIN — IPRATROPIUM BROMIDE 0.5 MG: 0.5 SOLUTION RESPIRATORY (INHALATION) at 09:24

## 2018-06-15 RX ADMIN — FERROUS SULFATE TAB 325 MG (65 MG ELEMENTAL FE) 325 MG: 325 (65 FE) TAB at 08:33

## 2018-06-15 RX ADMIN — CARVEDILOL 6.25 MG: 3.12 TABLET, FILM COATED ORAL at 17:11

## 2018-06-15 RX ADMIN — AMLODIPINE BESYLATE 5 MG: 5 TABLET ORAL at 08:34

## 2018-06-15 RX ADMIN — GABAPENTIN 900 MG: 300 CAPSULE ORAL at 08:32

## 2018-06-15 RX ADMIN — LEVALBUTEROL HYDROCHLORIDE 1.25 MG: 1.25 SOLUTION, CONCENTRATE RESPIRATORY (INHALATION) at 13:40

## 2018-06-15 RX ADMIN — OXYCODONE HYDROCHLORIDE 5 MG: 5 TABLET ORAL at 21:47

## 2018-06-15 RX ADMIN — INSULIN GLARGINE 10 UNITS: 100 INJECTION, SOLUTION SUBCUTANEOUS at 21:33

## 2018-06-15 RX ADMIN — OXYCODONE HYDROCHLORIDE 5 MG: 5 TABLET ORAL at 14:24

## 2018-06-15 RX ADMIN — ACETAMINOPHEN 650 MG: 325 TABLET ORAL at 12:04

## 2018-06-15 RX ADMIN — NICOTINE 1 PATCH: 14 PATCH TRANSDERMAL at 08:36

## 2018-06-15 RX ADMIN — INSULIN LISPRO 2 UNITS: 100 INJECTION, SOLUTION INTRAVENOUS; SUBCUTANEOUS at 21:34

## 2018-06-15 RX ADMIN — TAMSULOSIN HYDROCHLORIDE 0.4 MG: 0.4 CAPSULE ORAL at 17:11

## 2018-06-15 RX ADMIN — OXYCODONE HYDROCHLORIDE 5 MG: 5 TABLET ORAL at 09:19

## 2018-06-15 RX ADMIN — Medication 1 TABLET: at 23:55

## 2018-06-15 RX ADMIN — GABAPENTIN 900 MG: 300 CAPSULE ORAL at 17:11

## 2018-06-15 RX ADMIN — LEVALBUTEROL HYDROCHLORIDE 1.25 MG: 1.25 SOLUTION, CONCENTRATE RESPIRATORY (INHALATION) at 09:24

## 2018-06-15 RX ADMIN — IPRATROPIUM BROMIDE 0.5 MG: 0.5 SOLUTION RESPIRATORY (INHALATION) at 13:40

## 2018-06-15 RX ADMIN — CARVEDILOL 6.25 MG: 3.12 TABLET, FILM COATED ORAL at 08:33

## 2018-06-15 RX ADMIN — POLYETHYLENE GLYCOL 3350 17 G: 17 POWDER, FOR SOLUTION ORAL at 08:32

## 2018-06-15 RX ADMIN — GABAPENTIN 900 MG: 300 CAPSULE ORAL at 21:35

## 2018-06-15 RX ADMIN — FLUTICASONE PROPIONATE AND SALMETEROL 1 PUFF: 50; 250 POWDER RESPIRATORY (INHALATION) at 21:36

## 2018-06-15 RX ADMIN — HEPARIN SODIUM 5000 UNITS: 5000 INJECTION, SOLUTION INTRAVENOUS; SUBCUTANEOUS at 21:35

## 2018-06-15 RX ADMIN — FERROUS SULFATE TAB 325 MG (65 MG ELEMENTAL FE) 325 MG: 325 (65 FE) TAB at 17:11

## 2018-06-15 RX ADMIN — IPRATROPIUM BROMIDE 0.5 MG: 0.5 SOLUTION RESPIRATORY (INHALATION) at 19:17

## 2018-06-15 NOTE — PROGRESS NOTES
[facility] Progress Note  Patient: Ines Villarreal 56 y o  male   MRN: [id number]  PCP: Eduardo Cheng DO  Unit/Bed#: E2 -01 Encounter: [id number]  Date Of Visit: 06/15/18    Assessment:    Principal Problem:    Cellulitis of scrotum  Active Problems:    DILCIA (acute kidney injury) ([facility] [address] )    Type 2 diabetes mellitus with kidney complication, with long-term current use of insulin ([facility])    Essential hypertension    Current smoker    At risk for noncompliance    Anemia    Acute on chronic diastolic congestive heart failure (HCC)    Proteinuria    Lower extremity edema    Hyperlipidemia    BPH (benign prostatic hyperplasia)      Plan:    · Scrotal edema/ probable cellulitis: Seen and evaluated by urology who has recommended scrotal elevation, and course of IV antibiotics  No evidence of significant infection, abscess, or gangrene  Completed a 10 day course of IV Ancef  Patient remains afebrile  And without a white count  Suspect edema more related to nephrotic syndrome  S/p lasix dripped with marked diuresis  Continue to monitor I's &O's  and daily weights  Weight appears to be trending downward at 306 lbs --> 278 lbs    · Lightheadedness/dizziness:  Patient notes experiencing this early this morning  This is after marked diuresis  Orthostatics were checked and remained negative  Lying 116/62, sitting 103/58, standing 125/59       · DILCIA on CKD stage 3: difficult to discern baseline  Creatinine on admission was 1 95  Creatinine slightly worsened on Lasix drip  Cr 2 32 --> 2 40  Lasix drip stopped  Appreciate Nephrology recommendations  Cr has plateaued at 2 3 which may be his new baseline   Currently on torsemide 40 mg BID      · Nephrotic range proteinuria: Likely secondary to long standing diabetes  Lab workup on admission revealed albumin of 2 4 and a urine protein creatinine ratio 4 6 g  Patient presenting with significant edema and scrotal edema   Despite being maintained on outpatient oral diuretics  Workup ongoing per Nephrology- SPEP/UPEP/light chains without monoclonal gammopathy  Hepatitis panel negative  Will need nephrology follow-up upon discharge      · Acute on chronic diastolic CHF: Echocardiogram with grade 2 Diastolic dysfunction  Chest x-ray from 06/07/2018 with pulmonary vascular congestion and focal increased density over the left hilum  Changes most likely secondary to vascular congestion  Repeat CXR 6/14/18 with improving vascular congestion     -With some crackles in bases today     -Will give 1 time dose of IV lasix 40 mg today      · Acute hypoxic respiratory failure: In setting of volume overload  Continue supplemental oxygen to maintain saturations greater than 90%  Back on 2 L oxygen NC  Monitor s/p one extra dose of IV lasix today  · Suspected COPD:  Patient still smoking 1/2 pack per day  Has smoked past 41 years  Still with mild shortness of breath and dyspnea on exertion  Suspect underlying  D  Started on scheduled Atrovent and Xopenex nebulizers  Outpatient PFTs recommended      · Type 2 diabetes: A1c 7 5  With diabetic nephropathy/nephrotic syndrome/peripheral neuropathy  Continue gabapentin  Metformin held  Pt did have a few   Episodes of hypoglycemia during this admission however blood sugars have improved and are currently 129, 155, 139  Continue lantus 10 units before bed and sliding scale      · Hyperlipidemia: Controlled on statin  Continue     · Anemia of chronic disease:  Hemoglobin 7 5  Appears to be within baseline which is 7 5 to 8 0  Hemoccult negative  Iron studies obtained this admission- Iron saturation 10, TIBC 246, Iron 24  S/p IV venofer  Continue iron supplementation  Will continue to trend hbg and monitor if transfusion is needed       · Tobacco abuse:  Continue nicotine patch    Counseled on cessation     · BPH: Continue Flomax      · Anxiety/depression: Continue amitriptyline     · History of homelessness: however pt reports having an apartment and close by help from a friend    · Incidental finding:  Possible avascular necrosis noted on chest x-ray  X-ray shoulder pending  VTE Pharmacologic Prophylaxis:   Pharmacologic: Heparin  Mechanical VTE Prophylaxis in Place: Yes    Discharge Plan: [facility]  Anticipate dc tomorrow    Discussions with Specialists or Other Care Team Provider: Dr Marshall Grimm    Education and Discussions with Family / Patient: patient    Time Spent for Care: 20 minutes  More than 50% of total time spent on counseling and coordination of care as described above  Current Length of Stay: 14 day(s)  Current Patient Status: Inpatient   Code Status: Level 1 - Full Code    Subjective:   Sleeping in bed upon arrival   Patient notes lightheadedness and dizziness earlier this morning  Currently on 2 L oxygen nasal cannula  Baseline of no oxygen at home  Discussed underlying history of smoking and ongoing vascular congestion  Will provide 1 time dose of IV Lasix today and monitor response  Notes swelling and scrotum is slightly improved  Denies any chest pain, chest pressure, palpitations  Awaiting rehab with anticipated discharge tomorrow  Objective:     Vitals:   Temp (24hrs), Av 1 °F (36 7 °C), Min:96 8 °F (36 °C), Max:99 2 °F (37 3 °C)    HR:  [82-91] 90  Resp:  [18] 18  BP: (103-146)/(58-76) 125/59  SpO2:  [88 %-95 %] 94 %  Body mass index is 39 95 kg/m²  Input and Output Summary (last 24 hours): Intake/Output Summary (Last 24 hours) at 06/15/18 1125  Last data filed at 06/15/18 0726   Gross per 24 hour   Intake             1310 ml   Output             4425 ml   Net            -3115 ml       Physical Exam:     Physical Exam   Constitutional: He is oriented to person, place, and time  He appears well-developed and well-nourished  No distress  HENT:   Head: Normocephalic and atraumatic  Eyes: Conjunctivae are normal    Cardiovascular: Normal rate, regular rhythm and normal heart sounds  Pulmonary/Chest: Effort normal  No respiratory distress  He has rales  He exhibits no tenderness  Abdominal: Soft  Bowel sounds are normal  He exhibits no distension and no mass  There is no tenderness  There is no rebound and no guarding  Genitourinary:   Genitourinary Comments: Scrotal edema noted   Musculoskeletal:   Lower extremity edema improving   Neurological: He is alert and oriented to person, place, and time  Skin: Skin is warm and dry  He is not diaphoretic  Psychiatric: He has a normal mood and affect  His behavior is normal    Nursing note and vitals reviewed  Additional Data:     Labs:      Results from last 7 days  Lab Units 06/15/18  0531  06/11/18  0512   WBC Thousand/uL 4 66  < > 5 37   HEMOGLOBIN g/dL 7 5*  < > 7 5*   HEMATOCRIT % 25 1*  < > 26 0*   PLATELETS Thousands/uL 200  < > 208   NEUTROS PCT %  --   --  61   LYMPHS PCT %  --   --  21   MONOS PCT %  --   --  16*   EOS PCT %  --   --  2   < > = values in this interval not displayed  Results from last 7 days  Lab Units 06/15/18  0531  06/13/18 0512   SODIUM mmol/L 139  < > 138   POTASSIUM mmol/L 4 1  < > 4 3   CHLORIDE mmol/L 97*  < > 99*   CO2 mmol/L 36*  < > 34*   BUN mg/dL 48*  < > 51*   CREATININE mg/dL 2 30*  < > 2 36*   CALCIUM mg/dL 8 2*  < > 8 7   TOTAL PROTEIN g/dL  --   --  6 3*   BILIRUBIN TOTAL mg/dL  --   --  0 22   ALK PHOS U/L  --   --  122*   ALT U/L  --   --  7*   AST U/L  --   --  24   GLUCOSE RANDOM mg/dL 128  < > 115   < > = values in this interval not displayed  * I Have Reviewed All Lab Data Listed Above  * Additional Pertinent Lab Tests Reviewed:  [facility] Admission Reviewed    Imaging:    Imaging Reports Reviewed Today Include:  Chest x-ray  Imaging Personally Reviewed by Myself Includes:  Shoulder x-ray    Recent Cultures (last 7 days):           Last 24 Hours Medication List:     Current Facility-Administered Medications:  acetaminophen 650 mg Oral Q6H PRN Indy Sleet CHRIS Raya   amitriptyline 75 mg Oral HS Jonna Raya PA-C   amLODIPine 5 mg Oral Daily Fadi Rosenthal DO   aspirin 81 mg Oral Daily Jonna Raya PA-C   atorvastatin 40 mg Oral Daily With Big Chantell Raya PA-C   bisacodyl 10 mg Rectal Daily PRN Kisha Quispe MD   carvedilol 6 25 mg Oral BID With Meals Joselyn Rodriguez DO   ferrous sulfate 325 mg Oral BID With Meals Kisha Quispe MD   fluticasone-salmeterol 1 puff Inhalation Q12H [address] Jonna Raya PA-C   gabapentin 900 mg Oral TID Jonna Raya PA-C   heparin (porcine) 5,000 Units Subcutaneous Q8H [address] Jonna Raya PA-C   insulin glargine 10 Units Subcutaneous HS Kisha Quispe MD   insulin lispro 2-12 Units Subcutaneous 4x Daily (AC & HS) Jonna Raya PA-C   ipratropium 0 5 mg Nebulization TID Zachary Ramirez MD   levalbuterol 1 25 mg Nebulization TID Zachary Ramirez MD   nicotine 1 patch Transdermal Daily Jonna Raya PA-C   ondansetron 4 mg Intravenous Q6H PRN Jonna Raya PA-C   oxyCODONE 5 mg Oral Q4H PRN Jonna Raya PA-C   polyethylene glycol 17 g Oral Daily Jonna Raya PA-C   senna-docusate sodium 1 tablet Oral BID PRN Laina Medellin PA-C   tamsulosin 0 4 mg Oral Daily With Coni Bernal MD   torsemide 40 mg Oral BID (diuretic) Alvina Evans DO        Today, Patient Was Seen By: Laina Medellin PA-C    ** Please Note: This note has been constructed using a voice recognition system   **

## 2018-06-15 NOTE — PROGRESS NOTES
Patient was up in the chair, now is resting in bed  Complains of pain  Medication given  No signs of distress noted  Call bell is within reach, alarms are set and audible  Will continue to monitor

## 2018-06-15 NOTE — PROGRESS NOTES
NEPHROLOGY PROGRESS NOTE   Muriel García 56 y o  male MRN: [id number]  Unit/Bed#: E2 -01 Encounter: [id number]  Reason for Consult:  Acute kidney injury    ASSESSMENT/PLAN:  1  Acute kidney injury, likely secondary to volume overload   2  Likely chronic kidney disease, question previous baseline, noted nephrotic range proteinuria  3  Nephrotic range proteinuria, likely secondary to longstanding diabetes as well as obesity  , serum electrophoresis/urine electrophoresis without monoclonal gammopathy, free light chain assay abnormal ratio 2 42, hepatitis panel negative  4  Diastolic dysfunction, ejection fraction of 46%, grade 2 diastolic dysfunction  5  Volume overload with associated scrotal edema, weight decreasing  6  Anemia, status post Venofer, low iron saturation  7  Hypertension, overall stable  8  Diabetes, poorly controlled      PLAN:  · Overall renal function appears to be fairly stable, may represent new baseline  · Given volume overload would continue with current diuretic, negative balance overall   · Electrolytes appear fairly stable    SUBJECTIVE:  Seen and examined  Patient awake alert  Still with persistent scrotal swelling although feels that his legs are improving  Currently eating breakfast   No active chest pain or shortness of Breath      Review of Systems    OBJECTIVE:  Current Weight: Weight - Scale: 126 kg (278 lb 7 1 oz)  Vitals:    06/14/18 2344 06/15/18 0532 06/15/18 0717 06/15/18 0724   BP: 135/64  132/60    BP Location: Right arm  Right arm    Pulse: 91  83    Resp: 18  18    Temp: 98 4 °F (36 9 °C)  99 2 °F (37 3 °C)    TempSrc: Temporal  Temporal    SpO2: 92%  (!) 88% 95%   Weight:  126 kg (278 lb 7 1 oz)     Height:           Intake/Output Summary (Last 24 hours) at 06/15/18 0911  Last data filed at 06/15/18 0726   Gross per 24 hour   Intake             1310 ml   Output             4775 ml   Net            -3465 ml       Physical Exam   Constitutional: He is oriented to person, place, and time  No distress  HENT:   Head: Normocephalic  Eyes: No scleral icterus  Neck: Neck supple  Cardiovascular: Normal rate and regular rhythm  Pulmonary/Chest: Effort normal and breath sounds normal  No respiratory distress  Abdominal: Soft  He exhibits no distension  Musculoskeletal: He exhibits edema (Decreasing edema, +1 +2)  Neurological: He is alert and oriented to person, place, and time  Skin: Skin is warm and dry  Psychiatric: He has a normal mood and affect         Medications:    Current Facility-Administered Medications:     acetaminophen (TYLENOL) tablet 650 mg, 650 mg, Oral, Q6H PRN, Jonna Raya PA-C, 650 mg at 06/04/18 1936    amitriptyline (ELAVIL) tablet 75 mg, 75 mg, Oral, HS, Jonna Raya PA-C, 75 mg at 06/14/18 2141    amLODIPine (NORVASC) tablet 5 mg, 5 mg, Oral, Daily, Brigido Rosenthal DO, 5 mg at 06/15/18 0834    aspirin (ECOTRIN LOW STRENGTH) EC tablet 81 mg, 81 mg, Oral, Daily, Jonna Raya PA-C, 81 mg at 06/15/18 7215    atorvastatin (LIPITOR) tablet 40 mg, 40 mg, Oral, Daily With Tessie Maxwell PA-C, 40 mg at 06/14/18 1611    bisacodyl (DULCOLAX) rectal suppository 10 mg, 10 mg, Rectal, Daily PRN, Yariel Caldwell MD, 10 mg at 06/07/18 1942    carvedilol (COREG) tablet 6 25 mg, 6 25 mg, Oral, BID With Meals, Tyrell Sotelo DO, 6 25 mg at 06/15/18 4860    ferrous sulfate tablet 325 mg, 325 mg, Oral, BID With Meals, Yariel Caldwell MD, 325 mg at 06/15/18 0833    fluticasone-salmeterol (ADVAIR) 250-50 mcg/dose inhaler 1 puff, 1 puff, Inhalation, Q12H [facility], Jonna Raya PA-C, 1 puff at 06/15/18 0835    gabapentin (NEURONTIN) capsule 900 mg, 900 mg, Oral, TID, Jonna Raya PA-C, 900 mg at 06/15/18 5516    heparin (porcine) subcutaneous injection 5,000 Units, 5,000 Units, Subcutaneous, Q8H [facility], 5,000 Units at 06/15/18 0639 **AND** [CANCELED] Platelet count, , , Once, Jonna Raya PA-C    insulin glargine (LANTUS) subcutaneous injection 10 Units 0 1 mL, 10 Units, Subcutaneous, HS, Tiffani Son MD, 10 Units at 06/14/18 2142    insulin lispro (HumaLOG) 100 units/mL subcutaneous injection 2-12 Units, 2-12 Units, Subcutaneous, 4x Daily (AC & HS), Stopped at 06/15/18 0827 **AND** Fingerstick Glucose (POCT), , , 4x Daily AC and at bedtime, Jonna Raya PA-C    ipratropium (ATROVENT) 0 02 % inhalation solution 0 5 mg, 0 5 mg, Nebulization, TID, Kaveh Buenrostro MD    [facility] inhalation solution 1 25 mg, 1 25 mg, Nebulization, TID, Kaveh Buenrostro MD    nicotine (NICODERM CQ) 14 mg/24hr TD 24 hr patch 1 patch, 1 patch, Transdermal, Daily, Jonna Raya PA-C, 1 patch at 06/15/18 0836    ondansetron (ZOFRAN) injection 4 mg, 4 mg, Intravenous, Q6H PRN, Jonna Raya PA-C    oxyCODONE (ROXICODONE) IR tablet 5 mg, 5 mg, Oral, Q4H PRN, Jonna Raya PA-C, 5 mg at 06/15/18 0225    polyethylene glycol (MIRALAX) packet 17 g, 17 g, Oral, Daily, Jonna Raya PA-C, 17 g at 06/15/18 3792    senna-docusate sodium (SENOKOT S) 8 6-50 mg per tablet 1 tablet, 1 tablet, Oral, BID PRN, Aquilino Menon PA-C    tamsulosin (FLOMAX) capsule 0 4 mg, 0 4 mg, Oral, Daily With Katherine Valencia MD, 0 4 mg at 06/14/18 1611    torsemide (DEMADEX) tablet 40 mg, 40 mg, Oral, BID (diuretic), Seda Rosenthal DO, 40 mg at 06/15/18 2054    Laboratory Results:    Results from last 7 days  Lab Units 06/15/18  0531 06/14/18  0501 06/13/18  0512 06/12/18  0459 06/11/18  0512 06/10/18  0601 06/09/18  0635   WBC Thousand/uL 4 66  --   --  4 60 5 37 5 44 5 94   HEMOGLOBIN g/dL 7 5*  --   --  7 6* 7 5* 7 4* 7 5*   HEMATOCRIT % 25 1*  --   --  26 3* 26 0* 26 0* 25 8*   PLATELETS Thousands/uL 200  --   --  253 208 216 191   SODIUM mmol/L 139 139 138 137 137 136 137   POTASSIUM mmol/L 4 1 4 2 4 3 4 7 5 1 5 3 4 9   CHLORIDE mmol/L 97* 98* 99* 99* 100 101 104   CO2 mmol/L 36* 35* 34* 32 28 28 27 BUN mg/dL 48* 49* 51* 53* 53* 48* 46*   CREATININE mg/dL 2 30* 2 34* 2 36* 2 48* 2 40* 2 32* 2 02*   CALCIUM mg/dL 8 2* 8 8 8 7 9 1 8 8 8 4 8 8   MAGNESIUM mg/dL  --   --   --  2 3  --   --   --    PHOSPHORUS mg/dL  --   --  4 9*  --   --  5 7*  --    TOTAL PROTEIN g/dL  --   --  6 3*  --   --  6 2*  --    GLUCOSE RANDOM mg/dL 128 140 115 137 124 146* 50*

## 2018-06-15 NOTE — SOCIAL WORK
Patient accepted at [facility]; liaison obtained authorization  Patient is a tentative transfer Saturday 6/16/18 at 1330; [facility] Barb arranged with Kiley Kennedy  Report numbers placed in patient's chart  Patient, facility and RN made aware  No other needs at present  CM to follow as needed

## 2018-06-15 NOTE — PHYSICAL THERAPY NOTE
Physical Therapy Cancellation Note  Attempted to see pt  This AM, however discussed with RN pt  Not appropriate to be seen at this time  Awaiting X-ray of L shoulder  Will cancel for today and continue to follow as appropriate       Elton Dong PTA

## 2018-06-15 NOTE — OCCUPATIONAL THERAPY NOTE
Occupational Therapy Cancellation Note:      Patient Name: Joshua Lewis  [id number] Date: 6/15/2018     Attempted to see pt for OT treatment session this AM, however pt currently not appropriate for therapy at this time 2* pending X-Ray of L shoulder  OT to follow pt on caseload as able to A w/ safe d/c planning and recommendations       Justo Kemp, OTR/L

## 2018-06-16 ENCOUNTER — APPOINTMENT (INPATIENT)
Dept: CT IMAGING | Facility: HOSPITAL | Age: 58
DRG: 501 | End: 2018-06-16
Payer: COMMERCIAL

## 2018-06-16 VITALS
DIASTOLIC BLOOD PRESSURE: 63 MMHG | TEMPERATURE: 98 F | HEART RATE: 89 BPM | WEIGHT: 275.13 LBS | HEIGHT: 70 IN | BODY MASS INDEX: 39.39 KG/M2 | OXYGEN SATURATION: 94 % | RESPIRATION RATE: 18 BRPM | SYSTOLIC BLOOD PRESSURE: 141 MMHG

## 2018-06-16 PROBLEM — R60.0 LOWER EXTREMITY EDEMA: Status: RESOLVED | Noted: 2018-06-14 | Resolved: 2018-06-16

## 2018-06-16 LAB
ANION GAP SERPL CALCULATED.3IONS-SCNC: 6 MMOL/L (ref 4–13)
BUN SERPL-MCNC: 49 MG/DL (ref 5–25)
CALCIUM SERPL-MCNC: 8.4 MG/DL (ref 8.3–10.1)
CHLORIDE SERPL-SCNC: 96 MMOL/L (ref 100–108)
CO2 SERPL-SCNC: 38 MMOL/L (ref 21–32)
CREAT SERPL-MCNC: 2.52 MG/DL (ref 0.6–1.3)
GFR SERPL CREATININE-BSD FRML MDRD: 28 ML/MIN/1.73SQ M
GLUCOSE SERPL-MCNC: 148 MG/DL (ref 65–140)
GLUCOSE SERPL-MCNC: 181 MG/DL (ref 65–140)
GLUCOSE SERPL-MCNC: 207 MG/DL (ref 65–140)
POTASSIUM SERPL-SCNC: 4.5 MMOL/L (ref 3.5–5.3)
SODIUM SERPL-SCNC: 140 MMOL/L (ref 136–145)

## 2018-06-16 PROCEDURE — 80048 BASIC METABOLIC PNL TOTAL CA: CPT | Performed by: PHYSICIAN ASSISTANT

## 2018-06-16 PROCEDURE — 94760 N-INVAS EAR/PLS OXIMETRY 1: CPT

## 2018-06-16 PROCEDURE — 99239 HOSP IP/OBS DSCHRG MGMT >30: CPT | Performed by: INTERNAL MEDICINE

## 2018-06-16 PROCEDURE — 84166 PROTEIN E-PHORESIS/URINE/CSF: CPT | Performed by: PATHOLOGY

## 2018-06-16 PROCEDURE — 94640 AIRWAY INHALATION TREATMENT: CPT

## 2018-06-16 PROCEDURE — 82948 REAGENT STRIP/BLOOD GLUCOSE: CPT

## 2018-06-16 PROCEDURE — 99232 SBSQ HOSP IP/OBS MODERATE 35: CPT | Performed by: NURSE PRACTITIONER

## 2018-06-16 PROCEDURE — 84165 PROTEIN E-PHORESIS SERUM: CPT | Performed by: PATHOLOGY

## 2018-06-16 PROCEDURE — 70450 CT HEAD/BRAIN W/O DYE: CPT

## 2018-06-16 RX ORDER — NICOTINE 21 MG/24HR
1 PATCH, TRANSDERMAL 24 HOURS TRANSDERMAL DAILY
Qty: 28 PATCH | Refills: 0 | Status: SHIPPED | OUTPATIENT
Start: 2018-06-17

## 2018-06-16 RX ORDER — FERROUS SULFATE 325(65) MG
325 TABLET ORAL 2 TIMES DAILY WITH MEALS
Qty: 30 TABLET | Refills: 0 | Status: SHIPPED | OUTPATIENT
Start: 2018-06-16

## 2018-06-16 RX ORDER — MECLIZINE HCL 12.5 MG/1
12.5 TABLET ORAL EVERY 8 HOURS PRN
Status: DISCONTINUED | OUTPATIENT
Start: 2018-06-16 | End: 2018-06-16 | Stop reason: HOSPADM

## 2018-06-16 RX ORDER — TORSEMIDE 20 MG/1
40 TABLET ORAL
Qty: 60 TABLET | Refills: 0 | Status: SHIPPED | OUTPATIENT
Start: 2018-06-16

## 2018-06-16 RX ORDER — LEVALBUTEROL 1.25 MG/.5ML
1.25 SOLUTION, CONCENTRATE RESPIRATORY (INHALATION)
Qty: 30 EACH | Refills: 0 | Status: SHIPPED | OUTPATIENT
Start: 2018-06-16

## 2018-06-16 RX ORDER — OXYCODONE HYDROCHLORIDE 5 MG/1
5 TABLET ORAL EVERY 8 HOURS PRN
Qty: 30 TABLET | Refills: 0 | Status: SHIPPED | OUTPATIENT
Start: 2018-06-16 | End: 2018-06-26

## 2018-06-16 RX ORDER — CARVEDILOL 6.25 MG/1
6.25 TABLET ORAL 2 TIMES DAILY WITH MEALS
Qty: 60 TABLET | Refills: 0 | Status: SHIPPED | OUTPATIENT
Start: 2018-06-16

## 2018-06-16 RX ORDER — MECLIZINE HCL 12.5 MG/1
12.5 TABLET ORAL EVERY 8 HOURS PRN
Qty: 30 TABLET | Refills: 0 | Status: SHIPPED | OUTPATIENT
Start: 2018-06-16

## 2018-06-16 RX ORDER — TAMSULOSIN HYDROCHLORIDE 0.4 MG/1
0.4 CAPSULE ORAL
Qty: 30 CAPSULE | Refills: 0 | Status: SHIPPED | OUTPATIENT
Start: 2018-06-16

## 2018-06-16 RX ADMIN — CARVEDILOL 6.25 MG: 3.12 TABLET, FILM COATED ORAL at 08:17

## 2018-06-16 RX ADMIN — HEPARIN SODIUM 5000 UNITS: 5000 INJECTION, SOLUTION INTRAVENOUS; SUBCUTANEOUS at 13:13

## 2018-06-16 RX ADMIN — FERROUS SULFATE TAB 325 MG (65 MG ELEMENTAL FE) 325 MG: 325 (65 FE) TAB at 08:17

## 2018-06-16 RX ADMIN — TORSEMIDE 40 MG: 20 TABLET ORAL at 08:17

## 2018-06-16 RX ADMIN — FLUTICASONE PROPIONATE AND SALMETEROL 1 PUFF: 50; 250 POWDER RESPIRATORY (INHALATION) at 08:18

## 2018-06-16 RX ADMIN — INSULIN LISPRO 4 UNITS: 100 INJECTION, SOLUTION INTRAVENOUS; SUBCUTANEOUS at 11:35

## 2018-06-16 RX ADMIN — LEVALBUTEROL HYDROCHLORIDE 1.25 MG: 1.25 SOLUTION, CONCENTRATE RESPIRATORY (INHALATION) at 07:45

## 2018-06-16 RX ADMIN — NICOTINE 1 PATCH: 14 PATCH TRANSDERMAL at 08:19

## 2018-06-16 RX ADMIN — POLYETHYLENE GLYCOL 3350 17 G: 17 POWDER, FOR SOLUTION ORAL at 08:18

## 2018-06-16 RX ADMIN — INSULIN LISPRO 2 UNITS: 100 INJECTION, SOLUTION INTRAVENOUS; SUBCUTANEOUS at 08:21

## 2018-06-16 RX ADMIN — AMLODIPINE BESYLATE 5 MG: 5 TABLET ORAL at 08:17

## 2018-06-16 RX ADMIN — IPRATROPIUM BROMIDE 0.5 MG: 0.5 SOLUTION RESPIRATORY (INHALATION) at 07:45

## 2018-06-16 RX ADMIN — HEPARIN SODIUM 5000 UNITS: 5000 INJECTION, SOLUTION INTRAVENOUS; SUBCUTANEOUS at 05:46

## 2018-06-16 RX ADMIN — ASPIRIN 81 MG: 81 TABLET, COATED ORAL at 08:17

## 2018-06-16 RX ADMIN — GABAPENTIN 900 MG: 300 CAPSULE ORAL at 08:17

## 2018-06-16 NOTE — PROGRESS NOTES
Called Agata King RN from [facility] for report on Petra Alpha  Medical records faxed  CT pending

## 2018-06-16 NOTE — PROGRESS NOTES
Patient is going to [facility]  Report given  Paper work and prescriptions faxed   [phone]

## 2018-06-16 NOTE — PROGRESS NOTES
Pt complains of feeling dizzy, disoriented and hungry  RN already gave a box lunch at 2230  RN rechecked  and VS- 141/65 HR 81 Sat O2 100% on 2L N/C RR 16  BG  And vitals stable will continue to monitor

## 2018-06-16 NOTE — PROGRESS NOTES
NEPHROLOGY PROGRESS NOTE   Jaya Ramos 56 y o  male MRN: [id number]  Unit/Bed#: E2 -01 Encounter: [id number]  Reason for Consult: DILCIA/CKD    ASSESSMENT/PLAN:  1  Acute kidney injury:  Likely secondary to volume overload   -previously on IV Lasix drip, now on oral diuretic dosing  -required additional IV dose yesterday secondary to increased shortness of breath  -creatinine slightly elevated most likely secondary to diuretic  -most likely will need a higher creatinine for volume management  -avoid nephrotoxins  -avoid hypotension to prevent decreased renal perfusion  -will continue to follow I/O, lab value and volume status    2  Suspect Chronic kidney disease:  Most recent baseline creatinine 1 8-1 9  -previous baseline 1 14 in June 2017-likely secondary to diabetes and hypertension and hyperlipidemia  -may have new baseline  -will need outpatient follow-up once discharged with Nephrology    3  Nephrotic range proteinuria:  Suspect secondary to longstanding diabetes as well as obesity  -workup revealed:SPEP/UPEP without monoclonal gammopathy, FLC assay abnormal ratio 2 42, hepatitis panel negative    4  Acute on chronic diastolic CHF:  Echocardiogram reports grade 2 diastolic dysfunction and EF 60%  -currently on oral diuretics torsemide 40 mg b i d   -He is status post one dose IV Lasix yesterday  -may need to consider increase dosing  -trend for now  -net negative-24 L, and decreased 37 4 lb since the admission    5  Volume overload: with associated scrotal edema    6  Anemia:  Likely of CD  -status post Venofer for low iron saturation  -now on oral dosing supplements  -hemoglobin stable at 7 5    7  Hypertension:  BP acceptable  -continues on Norvasc 5 mg daily and Carvedilol 6 25 mg b i d  -continue to trend for now    8  Diabetes:  Poorly controlled  -per primary team    9    Metabolic alkalosis:  Likely contracture all with diuretic use  -bicarb 38  -may need to consider Diamox  -trend for now        SUBJECTIVE:  Patient seen and examined, appears comfortable in chair, however does complain of ongoing shortness of breath on room air though, complains of feeling dizzy when standing, denies chest pain   Per report patient received extra dose of IV Lasix yesterday for increased shortness of breath; patient unsure if it helped    OBJECTIVE:  Current Weight: Weight - Scale: 125 kg (275 lb 2 2 oz)  Vitals:    06/16/18 0600 06/16/18 0608 06/16/18 0745 06/16/18 0746   BP:    141/63   BP Location:    Right arm   Pulse:    89   Resp:    18   Temp:    98 °F (36 7 °C)   TempSrc:    Temporal   SpO2:   99% 94%   Weight: 125 kg (275 lb 2 2 oz) 125 kg (275 lb 2 2 oz)     Height:           Intake/Output Summary (Last 24 hours) at 06/16/18 0954  Last data filed at 06/16/18 0545   Gross per 24 hour   Intake             1250 ml   Output             2900 ml   Net            -1650 ml     General:  No acute distress, out of bed  Skin:  Warm and dry, no rash  HEENT: MMM, sclera anicteric  Neck:  Supple, no JVD noted  Chest:  Essentially clear, decreased bases, no crackles or wheezes noted  Heart: RRR, no rub  Abdomen:  Rounded, nontender, no distension, positive bowel sounds  Extremities: =1/2 edema noted bilaterally, PAD stockings in place  Neuro:  Alert and awake  Psych:  Appropriate affect    Medications:    Current Facility-Administered Medications:     acetaminophen (TYLENOL) tablet 650 mg, 650 mg, Oral, Q6H PRN, Jonna Raya PA-C, 650 mg at 06/15/18 1204    amitriptyline (ELAVIL) tablet 75 mg, 75 mg, Oral, HS, Jonna Raya PA-C, 75 mg at 06/15/18 2136    amLODIPine (NORVASC) tablet 5 mg, 5 mg, Oral, Daily, Eric Rosenthal DO, 5 mg at 06/16/18 0817    aspirin (ECOTRIN LOW STRENGTH) EC tablet 81 mg, 81 mg, Oral, Daily, Jonna Raya PA-C, 81 mg at 06/16/18 0817    atorvastatin (LIPITOR) tablet 40 mg, 40 mg, Oral, Daily With Cam Raya PA-C, 40 mg at 06/15/18 2718    bisacodyl (DULCOLAX) rectal suppository 10 mg, 10 mg, Rectal, Daily PRN, Braulio Byrne MD, 10 mg at 06/07/18 1942    carvedilol (COREG) tablet 6 25 mg, 6 25 mg, Oral, BID With Meals, Luis Carlos Villanueva DO, 6 25 mg at 06/16/18 5350    ferrous sulfate tablet 325 mg, 325 mg, Oral, BID With Meals, Braulio Byrne MD, 325 mg at 06/16/18 0817    fluticasone-salmeterol (ADVAIR) 250-50 mcg/dose inhaler 1 puff, 1 puff, Inhalation, Q12H [address], Jonna Raya PA-C, 1 puff at 06/16/18 0818    gabapentin (NEURONTIN) capsule 900 mg, 900 mg, Oral, TID, Jonna Raya PA-C, 900 mg at 06/16/18 0817    heparin (porcine) subcutaneous injection 5,000 Units, 5,000 Units, Subcutaneous, Q8H [address], 5,000 Units at 06/16/18 0546 **AND** [CANCELED] Platelet count, , , Once, Jonna Raya PA-C    insulin glargine (LANTUS) subcutaneous injection 10 Units 0 1 mL, 10 Units, Subcutaneous, HS, Braulio Byrne MD, 10 Units at 06/15/18 2133    insulin lispro (HumaLOG) 100 units/mL subcutaneous injection 2-12 Units, 2-12 Units, Subcutaneous, 4x Daily (AC & HS), 2 Units at 06/16/18 0821 **AND** Fingerstick Glucose (POCT), , , 4x Daily AC and at bedtime, Jonna Raya PA-C    ipratropium (ATROVENT) 0 02 % inhalation solution 0 5 mg, 0 5 mg, Nebulization, TID, Wade Mcbride MD, 0 5 mg at 06/16/18 0745    levalbuterol (Conklin Crome) inhalation solution 1 25 mg, 1 25 mg, Nebulization, TID, Wade Mcbride MD, 1 25 mg at 06/16/18 0745    nicotine (NICODERM CQ) 14 mg/24hr TD 24 hr patch 1 patch, 1 patch, Transdermal, Daily, Jonna Raya PA-C, 1 patch at 06/16/18 0819    ondansetron (ZOFRAN) injection 4 mg, 4 mg, Intravenous, Q6H PRN, Jonna Raya PA-C    oxyCODONE (ROXICODONE) IR tablet 5 mg, 5 mg, Oral, Q4H PRN, Jonna Raya PA-C, 5 mg at 06/15/18 2147    polyethylene glycol (MIRALAX) packet 17 g, 17 g, Oral, Daily, Jonna Raya PA-C, 17 g at 06/16/18 0818    senna-docusate sodium (SENOKOT S) 8 6-50 mg per tablet 1 tablet, 1 tablet, Oral, BID PRN, Josef Rangel PA-C, 1 tablet at 06/15/18 3925    tamsulosin (FLOMAX) capsule 0 4 mg, 0 4 mg, Oral, Daily With Maynor Valencia MD, 0 4 mg at 06/15/18 1711    torsemide (DEMADEX) tablet 40 mg, 40 mg, Oral, BID (diuretic), Harper Rosenthal DO, 40 mg at 06/16/18 3552    Laboratory Results:    Results from last 7 days  Lab Units 06/16/18  0602 06/15/18  0531 06/14/18  0501 06/13/18  0512 06/12/18  0459 06/11/18  0512 06/10/18  0601   WBC Thousand/uL  --  4 66  --   --  4 60 5 37 5 44   HEMOGLOBIN g/dL  --  7 5*  --   --  7 6* 7 5* 7 4*   HEMATOCRIT %  --  25 1*  --   --  26 3* 26 0* 26 0*   PLATELETS Thousands/uL  --  200  --   --  253 208 216   SODIUM mmol/L 140 139 139 138 137 137 136   POTASSIUM mmol/L 4 5 4 1 4 2 4 3 4 7 5 1 5 3   CHLORIDE mmol/L 96* 97* 98* 99* 99* 100 101   CO2 mmol/L 38* 36* 35* 34* 32 28 28   BUN mg/dL 49* 48* 49* 51* 53* 53* 48*   CREATININE mg/dL 2 52* 2 30* 2 34* 2 36* 2 48* 2 40* 2 32*   CALCIUM mg/dL 8 4 8 2* 8 8 8 7 9 1 8 8 8 4   MAGNESIUM mg/dL  --   --   --   --  2 3  --   --    PHOSPHORUS mg/dL  --   --   --  4 9*  --   --  5 7*   TOTAL PROTEIN g/dL  --   --   --  6 3*  --   --  6 2*   GLUCOSE RANDOM mg/dL 148* 128 140 115 137 124 146*

## 2018-06-16 NOTE — DISCHARGE SUMMARY
Discharge Summary - [facility]    Patient Information: Beni Langston 56 y o  male MRN: [id number]  Unit/Bed#: E2 -01 Encounter: [id number]    Discharging Physician / Practitioner: Nate Diaz PA-C  PCP: Rosario Treviño DO  Admission Date: 6/1/2018  Discharge Date: 06/16/18    Disposition:     Other: [facility] rehab    Reason for Admission: Probable scrotal cellulitis/edema    Discharge Diagnoses:     Principal Problem:    Cellulitis of scrotum  Active Problems:    DILCIA (acute kidney injury) ([facility] [address] )    Type 2 diabetes mellitus with kidney complication, with long-term current use of insulin ([facility])    Essential hypertension    Current smoker    At risk for noncompliance    Anemia    Acute on chronic diastolic congestive heart failure ([facility] [address] )    Proteinuria    Hyperlipidemia    BPH (benign prostatic hyperplasia)  Resolved Problems:    Constipation    Acute respiratory failure with hypoxia ([email or web address] )    Lower extremity edema      Consultations During Hospital Stay:  · Nephrology-Dr Rose Human  · Urology-Dr Sally Gonzalez    Procedures Performed:   · 6/1/18 ultrasound scrotum and testicles:  Severe scrotal soft tissue swelling/edema suggesting cellulitis  No drainable fluid collection identified  Small bilateral complex debris containing hydroceles  · 6/5/18 ultrasound kidney and bladder: RIGHT kidney:  12 7 cm  Normal echogenicity and contour  No suspicious masses detected  No hydronephrosis  No shadowing calculi  No perinephric fluid collections  LEFT kidney  Left kidney was not identified  Nondiagnostic for left kidney  URETERS: Nonvisualized  BLADDER: Nonvisualized  IMPRESSION: Extremely limited/nearly nondiagnostic exam  No right hydronephrosis  Left kidney was not seen due to bowel gas artifact  Nonvisualized bladder  · 6/7/18 chest x-ray portable:  Pulmonary vascular congestion focal increased density overlying left ilium    This may be vascular, particularly given changes of congestion though the possibility of underlying lesion is not excluded  A follow-up PA and lateral radiograph is recommended after diuresis  · 6/14/18 chest x-ray PA and lateral: 1  Improving vascular congestion with bilateral pleural effusions  2   Abnormal appearance of the left humeral head, suggestive of avascular necrosis  Dedicated left shoulder radiographs are recommended for further evaluation  · 6/15/18 x-ray two view left shoulder:  Chronic appearing ununited greater tuberosity fracture    Significant Findings / Test Results:   · Probable scrotal cellulitis status post IV antibiotics  · Scrotal edema likely secondary to nephrotic syndrome status post IV diuresis  · DILCIA on CKD stage 3- new baseline appearing to be around 2 3  · Acute on chronic diastolic CHF-requiring IV diuresis    Incidental Findings:   · Suspected COPD    Test Results Pending at Discharge (will require follow up): · None     Outpatient follow-up Requested:  · Nephrology  · Pulmonology-outpatient PFTs    Complications:  None    Hospital Course:     Mortimer Sheer is a 56 y o  male patient with past medical history of type 2 diabetes, essential hypertension, ongoing tobacco abuse, anemia chronic disease, chronic diastolic CHF, hyperlipidemia, BPH  He originally presented to the hospital on 6/1/2018 due to scrotal edema and tenderness x1 month  Please see below for hospital course:    · Scrotal edema/ probable cellulitis: Seen and evaluated by urology who has recommended scrotal elevation, and course of IV antibiotics   No evidence of significant infection, abscess, or gangrene  Completed a 10 day course of IV Ancef  Patient remains afebrile  And without a white count   Suspect edema more related to nephrotic syndrome   S/p lasix dripped with marked diuresis (lost approximately 36 lbs since admission)  Weight 311 lbs --> 275 lbs     · Lightheadedness/dizziness: Orthostatics were checked and remained negative    Lying 116/62, sitting 103/58, standing 125/59  Chronic and not positional  Not likey related to anemia as he is not orthostatic  Not consistent with vertigo  CT of head done today with no acute intracranial abnormality  Upon review of medications, pt was started on oxycodone this admission for pain of scrotum  May be contributing to dizziness  Goal is to ween and taper as able at rehab       · DILCIA on CKD stage 3: See and evaluated by nephrology  Difficult to discern baseline   Creatinine on admission was 1 95   Creatinine slightly worsened on Lasix drip  Cr 2 32 --> 2 40  Lasix drip stopped after marked diuresis  Cr has plateaued at 2 3 which may be his new baseline  Currently on torsemide 40 mg BID  Will need to avoid hypotension      · Nephrotic range proteinuria: Likely secondary to long standing diabetes  Lab workup on admission revealed albumin of 2 4 and a urine protein creatinine ratio 4 6 g  Patient presenting with significant edema and scrotal edema despite being maintained on outpatient oral diuretics   Workup per Nephrology- SPEP/UPEP/light chains without monoclonal gammopathy  Hepatitis panel negative  Needs nephrology f/u on discharge      · Acute on chronic diastolic CHF: Echocardiogram with grade 2 Diastolic dysfunction and EF of 60%   Chest x-ray from 06/07/2018 with pulmonary vascular congestion and focal increased density over the left hilum   Changes most likely secondary to vascular congestion  Repeat CXR 6/14/18 with improving vascular congestion  Beta blocker started this admission- coreg 6 25 mg BID  -Developed hypoxia and shortness of breath and required additional dose of IV lasix 40 mg x1     -Improved breathing status, lower extremity edema at present     -Now on room air and saturating in the mid 90's  -Script provided for torsemide 40 mg BID      · Acute hypoxic respiratory failure:  In setting of volume overload  Maintained on supplemental oxygen to achieve saturations greater than 90%   Improved and now saturating on room air      · Suspected COPD:  Patient still smoking 1/2 pack per day  Has smoked past 41 years  Suspect underlying COPD  Started on scheduled Atrovent and Xopenex nebulizers with improved air movement  Also started on advair here  Outpatient PFTs recommended      · Type 2 diabetes: A1c 7 5  With diabetic nephropathy/nephrotic syndrome/peripheral neuropathy  Continue gabapentin  Metformin held while inpatient  Pt did have a few episodes of hypoglycemia during this admission however blood sugars have improved and are currently 168, 181, 207  On lantus 10 units before bed and insulin sliding scale  Okay to resume regular insulin schedule upon dc as this is what he was on prior to this hospital admission      · Hyperlipidemia: Controlled on statin  Continue     · Anemia of chronic disease:  Hemoglobin 7 5  Appears to be within baseline which is 7 5 to 8 0    Hemoccult negative  Iron studies obtained this admission- Iron saturation 10, TIBC 246, Iron 24  S/p IV venofer  Continue iron supplementation  Pt did not require any transfusions during this hospitalization      · Tobacco abuse: Smokes 1/2 ppd  Smoking history of >41 years  Nicotine patch provided here   Counseled on cessation     · BPH: Continue Flomax  Script provided as this is a new medication      · Anxiety/depression: Continue amitriptyline     · History of homelessness: however pt reports having an apartment and close by help from a friend     · Incidental finding: Chronic appearing ununited greater tuberosity fracture  In conclusion, patient is stable for discharge at this time to [facility]  Condition at Discharge: stable     Discharge Day Visit / Exam:     Subjective:  Pt resting in chair upon arrival  He notes his breathing has improved and he is currently on room air  Notes swelling in his lower legs has improved and has noticed a small improvement in his scrotal swelling as well    Still with some intermittent dizziness  Orthostatics remain negative  Without any other complaints  Tolerating oral diet  Denies any chest pain, chest pressure, palpitations, nausea vomiting, abdominal pain, dysuria, diarrhea  Vitals: Blood Pressure: 141/63 (06/16/18 0746)  Pulse: 89 (06/16/18 0746)  Temperature: 98 °F (36 7 °C) (06/16/18 0746)  Temp Source: Temporal (06/16/18 0746)  Respirations: 18 (06/16/18 0746)  Height: 5' 10" (177 8 cm) (06/01/18 2015)  Weight - Scale: 125 kg (275 lb 2 2 oz) (06/16/18 0608)  SpO2: 94 % (06/16/18 0746)     Exam:   Physical Exam   Constitutional: He is oriented to person, place, and time  He appears well-developed and well-nourished  No distress  HENT:   Head: Normocephalic and atraumatic  Eyes: Conjunctivae are normal    Cardiovascular: Normal rate, regular rhythm and normal heart sounds  Pulmonary/Chest: Effort normal  No respiratory distress  He has no wheezes  He has no rales  He exhibits no tenderness  Improving crackles compared to yesterday   Abdominal: Soft  Bowel sounds are normal  He exhibits no distension and no mass  There is no tenderness  There is no rebound and no guarding  Genitourinary:   Genitourinary Comments: Improving scrotal edema   Musculoskeletal:   Mild lower extremity edema  DINORAH stockings in place   Neurological: He is alert and oriented to person, place, and time  Skin: Skin is warm  He is not diaphoretic  Psychiatric: He has a normal mood and affect  His behavior is normal    Vitals reviewed  Discharge instructions/Information to patient and family:   See after visit summary for information provided to patient and family  Provisions for Follow-Up Care:  See after visit summary for information related to follow-up care and any pertinent home health orders  Planned Readmission: no     Discharge Statement:  I spent 55 minutes discharging the patient  This time was spent on the day of discharge   I had direct contact with the patient on the day of discharge  Greater than 50% of the total time was spent examining patient, answering all patient questions, arranging and discussing plan of care with patient as well as directly providing post-discharge instructions  Additional time then spent on discharge activities  Discharge Medications:  See after visit summary for reconciled discharge medications provided to patient and family        ** Please Note: This note has been constructed using a voice recognition system **

## 2018-06-16 NOTE — PLAN OF CARE
Problem: PAIN - ADULT  Goal: Verbalizes/displays adequate comfort level or baseline comfort level  Interventions:  - Encourage patient to monitor pain and request assistance  - Assess pain using appropriate pain scale  - Administer analgesics based on type and severity of pain and evaluate response  - Implement non-pharmacological measures as appropriate and evaluate response  - Consider cultural and social influences on pain and pain management  - Notify physician/advanced practitioner if interventions unsuccessful or patient reports new pain   Outcome: Completed Date Met: 06/16/18      Problem: INFECTION - ADULT  Goal: Absence or prevention of progression during hospitalization  INTERVENTIONS:  - Assess and monitor for signs and symptoms of infection  - Monitor lab/diagnostic results  - Monitor all insertion sites, i e  indwelling lines, tubes, and drains  - Monitor endotracheal (as able) and nasal secretions for changes in amount and color  - [place] appropriate cooling/warming therapies per order  - Administer medications as ordered  - Instruct and encourage patient and family to use good hand hygiene technique  - Identify and instruct in appropriate isolation precautions for identified infection/condition   Outcome: Completed Date Met: 06/16/18    Goal: Absence of fever/infection during neutropenic period  INTERVENTIONS:  - Monitor WBC  - Implement neutropenic guidelines   Outcome: Completed Date Met: 06/16/18      Problem: SAFETY ADULT  Goal: Patient will remain free of falls  INTERVENTIONS:  - Assess patient frequently for physical needs  -  Identify cognitive and physical deficits and behaviors that affect risk of falls    -  [place] fall precautions as indicated by assessment   - Educate patient/family on patient safety including physical limitations  - Instruct patient to call for assistance with activity based on assessment  - Modify environment to reduce risk of injury  - Consider OT/PT consult to assist with strengthening/mobility    Outcome: Progressing    Goal: Maintain or return to baseline ADL function  INTERVENTIONS:  -  Assess patient's ability to carry out ADLs; assess patient's baseline for ADL function and identify physical deficits which impact ability to perform ADLs (bathing, care of mouth/teeth, toileting, grooming, dressing, etc )  - Assess/evaluate cause of self-care deficits   - Assess range of motion  - Assess patient's mobility; develop plan if impaired  - Assess patient's need for assistive devices and provide as appropriate  - Encourage maximum independence but intervene and supervise when necessary  ¯ Involve family in performance of ADLs  ¯ Assess for home care needs following discharge   ¯ Request OT consult to assist with ADL evaluation and planning for discharge  ¯ Provide patient education as appropriate   Outcome: Adequate for Discharge    Goal: Maintain or return mobility status to optimal level  INTERVENTIONS:  - Assess patient's baseline mobility status (ambulation, transfers, stairs, etc )    - Identify cognitive and physical deficits and behaviors that affect mobility  - Identify mobility aids required to assist with transfers and/or ambulation (gait belt, sit-to-stand, lift, walker, cane, etc )  - [place] fall precautions as indicated by assessment  - Record patient progress and toleration of activity level on Mobility SBAR; progress patient to next Phase/Stage  - Instruct patient to call for assistance with activity based on assessment  - Request Rehabilitation consult to assist with strengthening/weightbearing, etc    Outcome: Adequate for Discharge      Problem: GENITOURINARY - ADULT  Goal: Maintains or returns to baseline urinary function  INTERVENTIONS:  - Assess urinary function  - Encourage oral fluids to ensure adequate hydration  - Administer IV fluids as ordered to ensure adequate hydration  - Administer ordered medications as needed  - Offer frequent toileting  - Follow urinary retention protocol if ordered   Outcome: Adequate for Discharge    Goal: Absence of urinary retention  INTERVENTIONS:  - Assess patients ability to void and empty bladder  - Monitor I/O  - Bladder scan as needed  - Discuss with physician/AP medications to alleviate retention as needed  - Discuss catheterization for long term situations as appropriate   Outcome: Adequate for Discharge      Problem: Potential for Falls  Goal: Patient will remain free of falls  INTERVENTIONS:  - Assess patient frequently for physical needs  -  Identify cognitive and physical deficits and behaviors that affect risk of falls    -  [place] fall precautions as indicated by assessment   - Educate patient/family on patient safety including physical limitations  - Instruct patient to call for assistance with activity based on assessment  - Modify environment to reduce risk of injury  - Consider OT/PT consult to assist with strengthening/mobility    Outcome: Progressing      Problem: Prexisting or High Potential for Compromised Skin Integrity  Goal: Skin integrity is maintained or improved  INTERVENTIONS:  - Identify patients at risk for skin breakdown  - Assess and monitor skin integrity  - Assess and monitor nutrition and hydration status  - Monitor labs (i e  albumin)  - Assess for incontinence   - Turn and reposition patient  - Assist with mobility/ambulation  - Relieve pressure over bony prominences  - Avoid friction and shearing  - Provide appropriate hygiene as needed including keeping skin clean and dry  - Evaluate need for skin moisturizer/barrier cream  - Collaborate with interdisciplinary team (i e  Nutrition, Rehabilitation, etc )   - Patient/family teaching   Outcome: Adequate for Discharge      Problem: Nutrition/Hydration-ADULT  Goal: Nutrient/Hydration intake appropriate for improving, restoring or maintaining nutritional needs  Monitor and assess patient's nutrition/hydration status for malnutrition (ex- brittle hair, bruises, dry skin, pale skin and conjunctiva, muscle wasting, smooth red tongue, and disorientation)  Collaborate with interdisciplinary team and initiate plan and interventions as ordered  Monitor patient's weight and dietary intake as ordered or per policy  Utilize nutrition screening tool and intervene per policy  Determine patient's food preferences and provide high-protein, high-caloric foods as appropriate       INTERVENTIONS:  - Monitor oral intake, urinary output, labs, and treatment plans  - Assess nutrition and hydration status and recommend course of action  - Evaluate amount of meals eaten  - Assist patient with eating if necessary   - Allow adequate time for meals  - Recommend/ encourage appropriate diets, oral nutritional supplements, and vitamin/mineral supplements  - Order, calculate, and assess calorie counts as needed  - Recommend, monitor, and adjust tube feedings and TPN/PPN based on assessed needs  - Assess need for intravenous fluids  - Provide specific nutrition/hydration education as appropriate  - Include patient/family/caregiver in decisions related to nutrition   Outcome: Adequate for Discharge

## 2018-06-18 ENCOUNTER — TELEPHONE (OUTPATIENT)
Dept: NEPHROLOGY | Facility: CLINIC | Age: 58
End: 2018-06-18

## 2018-06-18 NOTE — TELEPHONE ENCOUNTER
----- Message from [address]  Floor sent at 6/17/2018  6:30 AM EDT -----  Hi   Patient discharged yesterday from [facility] [address]    Will need hospital follow up for DILCIA/CKD in 2 weeks with repeat BMP prior in [place] office  Thank you  Ani Mitchell

## 2018-07-20 ENCOUNTER — TELEPHONE (OUTPATIENT)
Dept: PULMONOLOGY | Facility: CLINIC | Age: 58
End: 2018-07-20

## 2018-08-23 ENCOUNTER — OFFICE VISIT (OUTPATIENT)
Dept: PULMONOLOGY | Facility: CLINIC | Age: 58
End: 2018-08-23
Payer: COMMERCIAL

## 2018-08-23 VITALS
TEMPERATURE: 98.4 F | DIASTOLIC BLOOD PRESSURE: 90 MMHG | WEIGHT: 265 LBS | HEIGHT: 69 IN | OXYGEN SATURATION: 96 % | RESPIRATION RATE: 18 BRPM | HEART RATE: 100 BPM | SYSTOLIC BLOOD PRESSURE: 170 MMHG | BODY MASS INDEX: 39.25 KG/M2

## 2018-08-23 DIAGNOSIS — J42 CHRONIC BRONCHITIS, UNSPECIFIED CHRONIC BRONCHITIS TYPE (HCC): Primary | ICD-10-CM

## 2018-08-23 PROCEDURE — 99244 OFF/OP CNSLTJ NEW/EST MOD 40: CPT | Performed by: INTERNAL MEDICINE

## 2018-08-23 NOTE — PROGRESS NOTES
Pulmonary outpatient note   Mortimer Sheer 56 y o  male MRN: [id number]  8/23/2018        Return in about 2 months (around 10/23/2018)  History of Present Illness   HPI:  Mortimer Sheer is a 56 y o  male with previous medical history of diastolic heart failure, diabetes mellitus type 2, hypertension and heavy smoking history  He smokes 1 pack a day since age 17 which is 37 pack years  He complains of daily cough and phlegm  He has recurrent hospitalizations due to congestive heart failure and volume overload  He was never seen by a pulmonologist or had PFTs  He takes Breo once daily  He never had acute exacerbation of COPD hospitalization according to him  In the last hospitalization he was given oxygen from home which she uses 2 liters/minute overnight  Review of Systems   Constitutional: Negative  HENT: Negative  Eyes: Negative  Respiratory: Positive for cough and shortness of breath  Cardiovascular: Positive for leg swelling  Gastrointestinal: Negative  Endocrine:        Diabetes mellitus type 2, hemoglobin A1c 7 5   Genitourinary: Negative  Musculoskeletal: Negative  Allergic/Immunologic: Negative  Neurological: Negative  Hematological: Negative  Psychiatric/Behavioral: Negative  Historical Information   Past Medical History:   Diagnosis Date    [facility]     Hyperlipidemia     Hypertension     Neuropathy      History reviewed  No pertinent surgical history  History reviewed  No pertinent family history      Meds/Allergies     Current Outpatient Prescriptions:     amitriptyline (ELAVIL) 75 mg tablet, Take 75 mg by mouth, Disp: , Rfl:     aspirin 81 MG tablet, one tab po daily, Disp: , Rfl:     atorvastatin (LIPITOR) 40 mg tablet, Take 40 mg by mouth, Disp: , Rfl:     carvedilol (COREG) 6 25 mg tablet, Take 1 tablet (6 25 mg total) by mouth 2 (two) times a day with meals, Disp: 60 tablet, Rfl: 0    ferrous sulfate 325 (65 Fe) mg tablet, Take 1 tablet (325 mg total) by mouth 2 (two) times a day with meals, Disp: 30 tablet, Rfl: 0    fluticasone-vilanterol (BREO ELLIPTA) 200-25 MCG/INH inhaler, , Disp: , Rfl:     gabapentin (NEURONTIN) 100 mg capsule, Take 900 mg by mouth 3 (three) times a day  , Disp: , Rfl:     insulin NPH-insulin regular (NovoLIN 70/30) 100 units/mL subcutaneous injection, Inject 40 Units under the skin every morning, Disp: , Rfl:     insulin NPH-insulin regular (NovoLIN 70/30) 100 units/mL subcutaneous injection, Inject 30 Units under the skin every evening, Disp: , Rfl:     ipratropium (ATROVENT) 0 02 % nebulizer solution, Take 1 vial (0 5 mg total) by nebulization 2 (two) times a day, Disp: 30 vial, Rfl: 0    levalbuterol (XOPENEX) 1 25 mg/0 5 mL nebulizer solution, Take 0 5 mL (1 25 mg total) by nebulization 2 (two) times a day, Disp: 30 each, Rfl: 0    lisinopril (ZESTRIL) 20 mg tablet, , Disp: , Rfl:     meclizine (ANTIVERT) 12 5 MG tablet, Take 1 tablet (12 5 mg total) by mouth every 8 (eight) hours as needed for dizziness, Disp: 30 tablet, Rfl: 0    metFORMIN (GLUCOPHAGE) 1000 MG tablet, Take 1,000 mg by mouth 2 (two) times a day with meals, Disp: , Rfl:     tamsulosin (FLOMAX) 0 4 mg, Take 1 capsule (0 4 mg total) by mouth daily with dinner, Disp: 30 capsule, Rfl: 0    torsemide (DEMADEX) 20 mg tablet, Take 2 tablets (40 mg total) by mouth 2 (two) times a day, Disp: 60 tablet, Rfl: 0    nicotine (NICODERM CQ) 14 mg/24hr TD 24 hr patch, Place 1 patch on the skin daily (Patient not taking: Reported on 8/23/2018 ), Disp: 28 patch, Rfl: 0    umeclidinium bromide (INCRUSE ELLIPTA) 62 5 mcg/inh AEPB inhaler, Inhale 1 puff daily, Disp: 1 Inhaler, Rfl: 5  No Known Allergies    Vitals: Blood pressure 170/90, pulse 100, temperature 98 4 °F (36 9 °C), temperature source Tympanic, resp  rate 18, height 5' 9" (1 753 m), weight 120 kg (265 lb), SpO2 96 %  Body mass index is 39 13 kg/m²   Oxygen Therapy  SpO2: 96 %  Oxygen Therapy: None (Room air)    Physical Exam  Physical Exam   Constitutional: He is oriented to person, place, and time  Smell of cigarette smoking   HENT:   Head: Normocephalic  Eyes: Pupils are equal, round, and reactive to light  Neck: Normal range of motion  Cardiovascular: Normal rate, regular rhythm, normal heart sounds and intact distal pulses  Pulmonary/Chest: No respiratory distress  He has rales  Abdominal:   Abdominal obesity   Musculoskeletal: Normal range of motion  Neurological: He is alert and oriented to person, place, and time  Skin: Skin is warm  Psychiatric: He has a normal mood and affect  Labs: I have personally reviewed pertinent lab results  Lab Results   Component Value Date    WBC 4 66 06/15/2018    HGB 7 5 (L) 06/15/2018    HCT 25 1 (L) 06/15/2018    MCV 94 06/15/2018     06/15/2018     Lab Results   Component Value Date    GLUCOSE 148 (H) 06/16/2018    CALCIUM 8 4 06/16/2018     06/16/2018    K 4 5 06/16/2018    CO2 38 (H) 06/16/2018    CL 96 (L) 06/16/2018    BUN 49 (H) 06/16/2018    CREATININE 2 52 (H) 06/16/2018     No results found for: IGE  Lab Results   Component Value Date    ALT 7 (L) 06/13/2018    AST 24 06/13/2018    ALKPHOS 122 (H) 06/13/2018    BILITOT 0 22 06/13/2018       Imaging and other studies:   I have personally reviewed pertinent imaging studies in PACS  Chest x-ray from June 2018:  Mild vascular congestion  No infiltrates or nodules seen  Bilateral pleural effusions, mild  Echo June 2018  LEFT VENTRICLE:  Systolic function was normal  Ejection fraction was estimated to be 60 %  There was mild concentric hypertrophy  Features were consistent with a pseudonormal left ventricular filling pattern, with concomitant abnormal relaxation and increased filling pressure (grade 2 diastolic dysfunction)      LEFT ATRIUM:  The atrium was mildly dilated      AORTIC VALVE:  The valve was probably trileaflet   Leaflets exhibited normal thickness, mild calcification, and normal cuspal separation      TRICUSPID VALVE:  Estimated peak PA pressure was 47 mmHg  Pulmonary function testing:  Not available    Assessment and plan:  Nadiya Friday has the following medical problems:  1  COPD  Suspected per history and smoking history  He is taking Breo daily but still very symptomatic, however without acute exacerbation of COPD hospitalizations  I prescribed increased Incruse and told him to take it once a day and to continue taking the Breo once a day  Also referred him to pulmonary function tests and 6 minutes walk to evaluate the severity of his COPD and the need for oxygen which she uses overnight  2   Smoking  He wants to stop smoking  I would prescribe Chantix in her next appointment  Since he is high risk for lung cancer I referred him for low-dose CT for screening for lung cancer per guidelines  3   Congestive heart failure  Grade 2 diastolic dysfunction  I will refer him to a cardiologist in her next appointment  He might have some pulmonary hypertension based on echo with peak right ventricle systolic pressure of 47      Follow-up in 4 weeks with pulmonary function test, 6 minutes walk, chest CT and repeat clinical response to the addition of long-acting muscarinic antagonist     Joaquín Valenzuela MD/PhD,  [facility]

## 2018-08-23 NOTE — PATIENT INSTRUCTIONS
Take Incruse once daily  Continue taking Breo once daily  Please do pulmonary function test  Please do 6 minutes walk test  Please do chest CT for screening of lung cancer  Follow-up in 4 weeks with the results

## 2018-08-23 NOTE — LETTER
August 23, 2018     Alyson Doan DO  [address] [place] [address]    Patient: María Mejia   YOB: 1960   Date of Visit: 8/23/2018       Dear Dr Juan Valdivia: Thank you for referring Brendan Batista to me for evaluation  Below are my notes for this consultation  If you have questions, please do not hesitate to call me  I look forward to following your patient along with you  Sincerely,        Gabi Ivan MD        CC: No Recipients  Gabi Ivan MD  8/23/2018  3:42 PM  Sign at close encounter      Pulmonary outpatient note   María Mejia 56 y o  male MRN: [id number]  8/23/2018        Return in about 2 months (around 10/23/2018)  History of Present Illness   HPI:  María Mejia is a 56 y o  male with previous medical history of diastolic heart failure, diabetes mellitus type 2, hypertension and heavy smoking history  He smokes 1 pack a day since age 17 which is 37 pack years  He complains of daily cough and phlegm  He has recurrent hospitalizations due to congestive heart failure and volume overload  He was never seen by a pulmonologist or had PFTs  He takes Breo once daily  He never had acute exacerbation of COPD hospitalization according to him  In the last hospitalization he was given oxygen from home which she uses 2 liters/minute overnight  Review of Systems   Constitutional: Negative  HENT: Negative  Eyes: Negative  Respiratory: Positive for cough and shortness of breath  Cardiovascular: Positive for leg swelling  Gastrointestinal: Negative  Endocrine:        Diabetes mellitus type 2, hemoglobin A1c 7 5   Genitourinary: Negative  Musculoskeletal: Negative  Allergic/Immunologic: Negative  Neurological: Negative  Hematological: Negative  Psychiatric/Behavioral: Negative          Historical Information   Past Medical History:   Diagnosis Date    Diabetes mellitus ([facility] )     Hyperlipidemia     Hypertension     Neuropathy History reviewed  No pertinent surgical history  History reviewed  No pertinent family history      Meds/Allergies     Current Outpatient Prescriptions:     amitriptyline (ELAVIL) 75 mg tablet, Take 75 mg by mouth, Disp: , Rfl:     aspirin 81 MG tablet, one tab po daily, Disp: , Rfl:     atorvastatin (LIPITOR) 40 mg tablet, Take 40 mg by mouth, Disp: , Rfl:     carvedilol (COREG) 6 25 mg tablet, Take 1 tablet (6 25 mg total) by mouth 2 (two) times a day with meals, Disp: 60 tablet, Rfl: 0    ferrous sulfate 325 (65 Fe) mg tablet, Take 1 tablet (325 mg total) by mouth 2 (two) times a day with meals, Disp: 30 tablet, Rfl: 0    fluticasone-vilanterol (BREO ELLIPTA) 200-25 MCG/INH inhaler, , Disp: , Rfl:     gabapentin (NEURONTIN) 100 mg capsule, Take 900 mg by mouth 3 (three) times a day  , Disp: , Rfl:     insulin NPH-insulin regular (NovoLIN 70/30) 100 units/mL subcutaneous injection, Inject 40 Units under the skin every morning, Disp: , Rfl:     insulin NPH-insulin regular (NovoLIN 70/30) 100 units/mL subcutaneous injection, Inject 30 Units under the skin every evening, Disp: , Rfl:     ipratropium (ATROVENT) 0 02 % nebulizer solution, Take 1 vial (0 5 mg total) by nebulization 2 (two) times a day, Disp: 30 vial, Rfl: 0    levalbuterol (XOPENEX) 1 25 mg/0 5 mL nebulizer solution, Take 0 5 mL (1 25 mg total) by nebulization 2 (two) times a day, Disp: 30 each, Rfl: 0    lisinopril (ZESTRIL) 20 mg tablet, , Disp: , Rfl:     meclizine (ANTIVERT) 12 5 MG tablet, Take 1 tablet (12 5 mg total) by mouth every 8 (eight) hours as needed for dizziness, Disp: 30 tablet, Rfl: 0    metFORMIN (GLUCOPHAGE) 1000 MG tablet, Take 1,000 mg by mouth 2 (two) times a day with meals, Disp: , Rfl:     tamsulosin (FLOMAX) 0 4 mg, Take 1 capsule (0 4 mg total) by mouth daily with dinner, Disp: 30 capsule, Rfl: 0    torsemide (DEMADEX) 20 mg tablet, Take 2 tablets (40 mg total) by mouth 2 (two) times a day, Disp: 60 tablet, Rfl: 0    nicotine (NICODERM CQ) 14 mg/24hr TD 24 hr patch, Place 1 patch on the skin daily (Patient not taking: Reported on 8/23/2018 ), Disp: 28 patch, Rfl: 0    umeclidinium bromide (INCRUSE ELLIPTA) 62 5 mcg/inh AEPB inhaler, Inhale 1 puff daily, Disp: 1 Inhaler, Rfl: 5  No Known Allergies    Vitals: Blood pressure 170/90, pulse 100, temperature 98 4 °F (36 9 °C), temperature source Tympanic, resp  rate 18, height 5' 9" (1 753 m), weight 120 kg (265 lb), SpO2 96 %  Body mass index is 39 13 kg/m²  Oxygen Therapy  SpO2: 96 %  Oxygen Therapy: None (Room air)    Physical Exam  Physical Exam   Constitutional: He is oriented to person, place, and time  Smell of cigarette smoking   HENT:   Head: Normocephalic  Eyes: Pupils are equal, round, and reactive to light  Neck: Normal range of motion  Cardiovascular: Normal rate, regular rhythm, normal heart sounds and intact distal pulses  Pulmonary/Chest: No respiratory distress  He has rales  Abdominal:   Abdominal obesity   Musculoskeletal: Normal range of motion  Neurological: He is alert and oriented to person, place, and time  Skin: Skin is warm  Psychiatric: He has a normal mood and affect  Labs: I have personally reviewed pertinent lab results  Lab Results   Component Value Date    WBC 4 66 06/15/2018    HGB 7 5 (L) 06/15/2018    HCT 25 1 (L) 06/15/2018    MCV 94 06/15/2018     06/15/2018     Lab Results   Component Value Date    GLUCOSE 148 (H) 06/16/2018    CALCIUM 8 4 06/16/2018     06/16/2018    K 4 5 06/16/2018    CO2 38 (H) 06/16/2018    CL 96 (L) 06/16/2018    BUN 49 (H) 06/16/2018    CREATININE 2 52 (H) 06/16/2018     No results found for: IGE  Lab Results   Component Value Date    ALT 7 (L) 06/13/2018    AST 24 06/13/2018    ALKPHOS 122 (H) 06/13/2018    BILITOT 0 22 06/13/2018       Imaging and other studies:   I have personally reviewed pertinent imaging studies in PACS    Chest x-ray from June 2018:  Mild vascular congestion  No infiltrates or nodules seen  Bilateral pleural effusions, mild  Echo June 2018  LEFT VENTRICLE:  Systolic function was normal  Ejection fraction was estimated to be 60 %  There was mild concentric hypertrophy  Features were consistent with a pseudonormal left ventricular filling pattern, with concomitant abnormal relaxation and increased filling pressure (grade 2 diastolic dysfunction)      LEFT ATRIUM:  The atrium was mildly dilated      AORTIC VALVE:  The valve was probably trileaflet  Leaflets exhibited normal thickness, mild calcification, and normal cuspal separation      TRICUSPID VALVE:  Estimated peak PA pressure was 47 mmHg  Pulmonary function testing:  Not available    Assessment and plan:  Melissa Vega has the following medical problems:  1  COPD  Suspected per history and smoking history  He is taking Breo daily but still very symptomatic, however without acute exacerbation of COPD hospitalizations  I prescribed increased Incruse and told him to take it once a day and to continue taking the Breo once a day  Also referred him to pulmonary function tests and 6 minutes walk to evaluate the severity of his COPD and the need for oxygen which she uses overnight  2   Smoking  He wants to stop smoking  I would prescribe Chantix in her next appointment  Since he is high risk for lung cancer I referred him for low-dose CT for screening for lung cancer per guidelines  3   Congestive heart failure  Grade 2 diastolic dysfunction  I will refer him to a cardiologist in her next appointment  He might have some pulmonary hypertension based on echo with peak right ventricle systolic pressure of 47      Follow-up in 4 weeks with pulmonary function test, 6 minutes walk, chest CT and repeat clinical response to the addition of long-acting muscarinic antagonist     Giulia Gary MD/PhD,  [facility]

## 2018-09-26 ENCOUNTER — HOSPITAL ENCOUNTER (OUTPATIENT)
Dept: PULMONOLOGY | Facility: HOSPITAL | Age: 58
Discharge: HOME | End: 2018-09-26
Attending: INTERNAL MEDICINE
Payer: COMMERCIAL

## 2018-09-26 ENCOUNTER — APPOINTMENT (OUTPATIENT)
Dept: CT IMAGING | Facility: HOSPITAL | Age: 58
End: 2018-09-26
Attending: INTERNAL MEDICINE
Payer: COMMERCIAL

## 2018-09-26 DIAGNOSIS — J42 CHRONIC BRONCHITIS, UNSPECIFIED CHRONIC BRONCHITIS TYPE (HCC): ICD-10-CM

## 2018-09-26 PROCEDURE — 94729 DIFFUSING CAPACITY: CPT

## 2018-09-26 PROCEDURE — 94729 DIFFUSING CAPACITY: CPT | Performed by: INTERNAL MEDICINE

## 2018-09-26 PROCEDURE — 94726 PLETHYSMOGRAPHY LUNG VOLUMES: CPT | Performed by: INTERNAL MEDICINE

## 2018-09-26 PROCEDURE — 94761 N-INVAS EAR/PLS OXIMETRY MLT: CPT

## 2018-09-26 PROCEDURE — 94060 EVALUATION OF WHEEZING: CPT | Performed by: INTERNAL MEDICINE

## 2018-09-26 PROCEDURE — 94618 PULMONARY STRESS TESTING: CPT | Performed by: INTERNAL MEDICINE

## 2018-09-26 PROCEDURE — 94726 PLETHYSMOGRAPHY LUNG VOLUMES: CPT

## 2018-09-26 PROCEDURE — 94060 EVALUATION OF WHEEZING: CPT

## 2018-09-26 RX ORDER — ALBUTEROL SULFATE 2.5 MG/3ML
2.5 SOLUTION RESPIRATORY (INHALATION) ONCE AS NEEDED
Status: COMPLETED | OUTPATIENT
Start: 2018-09-26 | End: 2018-09-26

## 2018-09-26 RX ADMIN — ALBUTEROL SULFATE 2.5 MG: 2.5 SOLUTION RESPIRATORY (INHALATION) at 13:27

## 2019-01-01 ENCOUNTER — OFFICE VISIT (OUTPATIENT)
Dept: FAMILY MEDICINE CLINIC | Facility: CLINIC | Age: 59
End: 2019-01-01

## 2019-01-01 ENCOUNTER — PATIENT OUTREACH (OUTPATIENT)
Dept: FAMILY MEDICINE CLINIC | Facility: CLINIC | Age: 59
End: 2019-01-01

## 2019-01-01 VITALS
SYSTOLIC BLOOD PRESSURE: 126 MMHG | WEIGHT: 209 LBS | OXYGEN SATURATION: 98 % | HEART RATE: 98 BPM | BODY MASS INDEX: 30.86 KG/M2 | RESPIRATION RATE: 16 BRPM | DIASTOLIC BLOOD PRESSURE: 64 MMHG | TEMPERATURE: 98 F

## 2019-01-01 DIAGNOSIS — E11.22 TYPE 2 DIABETES MELLITUS WITH STAGE 3 CHRONIC KIDNEY DISEASE, WITH LONG-TERM CURRENT USE OF INSULIN (HCC): Primary | ICD-10-CM

## 2019-01-01 DIAGNOSIS — K59.00 CONSTIPATION, UNSPECIFIED CONSTIPATION TYPE: ICD-10-CM

## 2019-01-01 DIAGNOSIS — E11.69 TYPE 2 DIABETES MELLITUS WITH OTHER SPECIFIED COMPLICATION, UNSPECIFIED WHETHER LONG TERM INSULIN USE (HCC): Primary | ICD-10-CM

## 2019-01-01 DIAGNOSIS — F17.200 CURRENT SMOKER: ICD-10-CM

## 2019-01-01 DIAGNOSIS — E11.22 TYPE 2 DIABETES MELLITUS WITH STAGE 3 CHRONIC KIDNEY DISEASE, WITH LONG-TERM CURRENT USE OF INSULIN (HCC): ICD-10-CM

## 2019-01-01 DIAGNOSIS — N18.30 TYPE 2 DIABETES MELLITUS WITH STAGE 3 CHRONIC KIDNEY DISEASE, WITH LONG-TERM CURRENT USE OF INSULIN (HCC): ICD-10-CM

## 2019-01-01 DIAGNOSIS — Z00.00 ENCOUNTER FOR PHYSICAL EXAMINATION: ICD-10-CM

## 2019-01-01 DIAGNOSIS — N18.30 TYPE 2 DIABETES MELLITUS WITH STAGE 3 CHRONIC KIDNEY DISEASE, WITH LONG-TERM CURRENT USE OF INSULIN (HCC): Primary | ICD-10-CM

## 2019-01-01 DIAGNOSIS — Z79.4 TYPE 2 DIABETES MELLITUS WITH STAGE 3 CHRONIC KIDNEY DISEASE, WITH LONG-TERM CURRENT USE OF INSULIN (HCC): ICD-10-CM

## 2019-01-01 DIAGNOSIS — Z78.9 NEED FOR FOLLOW-UP BY SOCIAL WORKER: ICD-10-CM

## 2019-01-01 DIAGNOSIS — Z23 NEED FOR VACCINATION: ICD-10-CM

## 2019-01-01 DIAGNOSIS — Z79.4 TYPE 2 DIABETES MELLITUS WITH STAGE 3 CHRONIC KIDNEY DISEASE, WITH LONG-TERM CURRENT USE OF INSULIN (HCC): Primary | ICD-10-CM

## 2019-01-01 PROCEDURE — 3725F SCREEN DEPRESSION PERFORMED: CPT | Performed by: FAMILY MEDICINE

## 2019-01-01 PROCEDURE — 99203 OFFICE O/P NEW LOW 30 MIN: CPT | Performed by: FAMILY MEDICINE

## 2019-01-01 PROCEDURE — 90715 TDAP VACCINE 7 YRS/> IM: CPT | Performed by: FAMILY MEDICINE

## 2019-01-01 PROCEDURE — 90471 IMMUNIZATION ADMIN: CPT | Performed by: FAMILY MEDICINE

## 2019-01-01 RX ORDER — BLOOD-GLUCOSE METER
EACH MISCELLANEOUS 3 TIMES DAILY
Qty: 1 KIT | Refills: 0 | Status: SHIPPED | OUTPATIENT
Start: 2019-01-01

## 2019-01-01 RX ORDER — LANCETS
EACH MISCELLANEOUS 3 TIMES DAILY
Qty: 100 EACH | Refills: 5 | Status: SHIPPED | OUTPATIENT
Start: 2019-01-01

## 2019-01-01 RX ORDER — BENZTROPINE MESYLATE 0.5 MG/1
TABLET ORAL
COMMUNITY
Start: 2016-10-28

## 2019-01-01 RX ORDER — BLOOD SUGAR DIAGNOSTIC
STRIP MISCELLANEOUS
Refills: 1 | COMMUNITY
Start: 2019-01-01

## 2019-01-01 RX ORDER — GABAPENTIN 100 MG/1
600 CAPSULE ORAL 3 TIMES DAILY
Qty: 540 CAPSULE | Refills: 2 | Status: SHIPPED | OUTPATIENT
Start: 2019-01-01 | End: 2020-02-23

## 2019-01-01 RX ORDER — PEN NEEDLE, DIABETIC 29 G X1/2"
NEEDLE, DISPOSABLE MISCELLANEOUS
Refills: 0 | COMMUNITY
Start: 2019-01-01

## 2019-01-01 RX ORDER — GABAPENTIN 600 MG/1
TABLET ORAL
Refills: 0 | COMMUNITY
Start: 2019-01-01

## 2019-01-01 RX ORDER — DOCUSATE SODIUM 100 MG/1
100 CAPSULE, LIQUID FILLED ORAL 2 TIMES DAILY
Qty: 60 CAPSULE | Refills: 2 | Status: SHIPPED | OUTPATIENT
Start: 2019-01-01

## 2019-01-01 RX ORDER — ESCITALOPRAM OXALATE 20 MG/1
TABLET ORAL
COMMUNITY
Start: 2016-10-11

## 2019-11-25 PROBLEM — K59.00 CONSTIPATION: Status: ACTIVE | Noted: 2019-01-01

## 2019-11-25 PROBLEM — Z00.00 ENCOUNTER FOR MEDICAL EXAMINATION TO ESTABLISH CARE: Status: ACTIVE | Noted: 2019-01-01

## 2019-11-25 PROBLEM — Z78.9 NEED FOR FOLLOW-UP BY SOCIAL WORKER: Status: ACTIVE | Noted: 2019-01-01

## 2019-11-25 NOTE — PROGRESS NOTES
Assessment/Plan:    Type 2 diabetes mellitus with kidney complication, with long-term current use of insulin (HCC)    Lab Results   Component Value Date    HGBA1C 7 5 (H) 06/01/2018    Appears well controlled A1c today in office is 6  This current regimen is using 999886 units in a m  and 30 in the afternoon  Instructed to decrease a m  Dose to 35 units and contact office with updated glucose logs  Verbalized understanding  Will need diabetic supplies refilled will forward this message to clinical pulled to ensure appropriate refills    Encounter for medical examination to establish care  CBC CMP TSH lipid panel ordered  Ambulatory referral for colonoscopy given  He is up-to-date with his flu shot as he received the last week  Tdap vaccination given  PFTs  orders I suspect this patient may have COPD    Current smoker  Counseled patient on the importance of smoking cessation  Counseling time was over 3 minutes  Patient acknowledges an understanding of the risks of smoking  At the very least, patient is agreeable to try cutting down  Discussed outside services to help with quitting smoking  Discussed the specifics of using nicotine patches, on a tapering schedule,       after stopping smoking  Tapering can last 6 months  While on the nicotine patches, I instructed on the use of PRN nicotine gum,       instead of reaching for a cigarette, in a moment of weakness  Need for follow-up by   I have concerns the patient may be homeless  Referral for social given    Constipation  Encouraged to increase hydration and increase fiber intake with fruits and vegetables   - trial of Colace given       Diagnoses and all orders for this visit:    Type 2 diabetes mellitus with other specified complication, unspecified whether long term insulin use (HCC)  -     docusate sodium (COLACE) 100 mg capsule;  Take 1 capsule (100 mg total) by mouth 2 (two) times a day  -     Comprehensive metabolic panel; Future    Constipation, unspecified constipation type  -     metFORMIN (GLUCOPHAGE) 1000 MG tablet; Take 1 tablet (1,000 mg total) by mouth 2 (two) times a day with meals  -     gabapentin (NEURONTIN) 100 mg capsule; Take 6 capsules (600 mg total) by mouth 3 (three) times a day  -     insulin NPH-insulin regular (NovoLIN 70/30) 100 units/mL subcutaneous injection; 40 units in AM and 30 units in PM    Need for vaccination  -     TDAP VACCINE GREATER THAN OR EQUAL TO 6YO IM    Encounter for physical examination  -     CBC and differential; Future  -     Lipid panel; Future  -     Comprehensive metabolic panel; Future  -     Ambulatory Referral to GI Endoscopy; Future    Type 2 diabetes mellitus with stage 3 chronic kidney disease, with long-term current use of insulin ([address] )    Current smoker    Need for follow-up by   -     Ambulatory referral to social work care management program; Future    Other orders  -     benztropine (COGENTIN) 0 5 mg tablet; take 1 tablet by mouth twice a day  -     escitalopram (LEXAPRO) 20 mg tablet; take 1 tablet by mouth every morning  -     gabapentin (NEURONTIN) 600 MG tablet  -     ACCU-CHEK GUIDE test strip  -     insulin lispro protamine-insulin lispro (HumaLOG 75/25) 100 units/mL; 46 units twice a day  -     BD INSULIN SYRINGE U/F 30G X 1/2" 0 5 ML MISC          Subjective:      Patient ID: David Espinal is a 66 y o  male  HPI     60-year-old male with a history of hypertension, hyperlipidemia, type 2 diabetes on insulin presents to clinic to establish care  Patient states that prior PCPs to far away and not interested in continuing care  Is requesting diabetes medication refills, admits to having 2 syncopal episodes while at soup kitchen states his blood sugar was noted to be in the 40s, which responded appropriately to glucose    Denies having his medication recently adjust   Is requesting Tdap vaccination today  Is up-to-date with flu shot  Is due for colon screening    The following portions of the patient's history were reviewed and updated as appropriate: allergies, current medications, past family history, past medical history, past social history, past surgical history and problem list     Review of Systems   Constitutional: Negative for activity change, appetite change, chills, diaphoresis, fatigue and fever  HENT: Negative for congestion, ear pain, hearing loss, postnasal drip, rhinorrhea, sneezing, sore throat and tinnitus  Eyes: Negative for pain  Respiratory: Negative for apnea, cough, choking, chest tightness, shortness of breath and wheezing  Cardiovascular: Negative for chest pain, palpitations and leg swelling  Gastrointestinal: Negative for abdominal distention, abdominal pain, constipation, diarrhea, nausea and vomiting  Genitourinary: Negative for decreased urine volume and dysuria  Musculoskeletal: Negative for back pain  Skin: Negative for rash  Neurological: Negative for dizziness, tremors and syncope  Psychiatric/Behavioral: Negative for agitation and suicidal ideas  Objective:      /64 (BP Location: Right arm, Patient Position: Sitting, Cuff Size: Large)   Pulse 98   Temp 98 °F (36 7 °C) (Temporal)   Resp 16   Wt 94 8 kg (209 lb)   SpO2 98%   BMI 30 86 kg/m²          Physical Exam   Constitutional: He is oriented to person, place, and time  No distress  Unkempt and disheveled   HENT:   Right Ear: External ear normal    Left Ear: External ear normal    Eyes: Conjunctivae are normal    Neck: No JVD present  No tracheal deviation present  No thyromegaly present  Cardiovascular: Normal rate and regular rhythm  No murmur heard  Pulmonary/Chest: Effort normal  He has wheezes  He exhibits no tenderness  Abdominal: Soft  He exhibits no distension  There is no tenderness  There is no guarding  Musculoskeletal: Normal range of motion  He exhibits no edema     Neurological: He is alert and oriented to person, place, and time  Skin: Skin is warm  He is not diaphoretic  Vitals reviewed

## 2019-11-25 NOTE — ASSESSMENT & PLAN NOTE
Counseled patient on the importance of smoking cessation  Counseling time was over 3 minutes  Patient acknowledges an understanding of the risks of smoking  At the very least, patient is agreeable to try cutting down  Discussed outside services to help with quitting smoking  Discussed the specifics of using nicotine patches, on a tapering schedule,       after stopping smoking  Tapering can last 6 months  While on the nicotine patches, I instructed on the use of PRN nicotine gum,       instead of reaching for a cigarette, in a moment of weakness

## 2019-11-25 NOTE — ASSESSMENT & PLAN NOTE
CBC CMP TSH lipid panel ordered  Ambulatory referral for colonoscopy given  He is up-to-date with his flu shot as he received the last week  Tdap vaccination given  PFTs  orders I suspect this patient may have COPD

## 2019-11-25 NOTE — ASSESSMENT & PLAN NOTE
Encouraged to increase hydration and increase fiber intake with fruits and vegetables   - trial of Colace given

## 2019-11-25 NOTE — ASSESSMENT & PLAN NOTE
Lab Results   Component Value Date    HGBA1C 7 5 (H) 06/01/2018    Appears well controlled A1c today in office is 6  This current regimen is using 003109 units in a m  and 30 in the afternoon  Instructed to decrease a m  Dose to 35 units and contact office with updated glucose logs    Verbalized understanding  Will need diabetic supplies refilled will forward this message to clinical pulled to ensure appropriate refills

## 2019-12-13 NOTE — LETTER
[facility] [facility] [place]  [address]  [phone]    Re: Care Coordination   12/18/2019       Dear Cassi Ramirez,    We tried to reach you by phone on 12/13/19, 12/16/19, 12/18/19 and was unfortunately unable to reach you  It is important that you contact the [place] [id number] as soon as possible      Sincerely,         GARY Wallace, [facility]   Care Manager  [phone]

## 2019-12-18 NOTE — PROGRESS NOTES
[employer] referral from Dr Lisa Abrams for possible homelessness  Three outreach attempts by [employer], but pt has not returned any of these calls  Unable to reach letter sent to pt  [employer] is closing referral at this time  [employer] remains available for additional support as needed via order

## 2020-01-01 ENCOUNTER — APPOINTMENT (INPATIENT)
Dept: RADIOLOGY | Facility: HOSPITAL | Age: 60
DRG: 133 | End: 2020-01-01
Payer: COMMERCIAL

## 2020-01-01 ENCOUNTER — HOSPITAL ENCOUNTER (INPATIENT)
Facility: HOSPITAL | Age: 60
LOS: 8 days | DRG: 133 | End: 2020-01-29
Attending: INTERNAL MEDICINE | Admitting: INTERNAL MEDICINE
Payer: COMMERCIAL

## 2020-01-01 ENCOUNTER — APPOINTMENT (EMERGENCY)
Dept: CT IMAGING | Facility: HOSPITAL | Age: 60
End: 2020-01-01
Payer: COMMERCIAL

## 2020-01-01 ENCOUNTER — OFFICE VISIT (OUTPATIENT)
Dept: FAMILY MEDICINE CLINIC | Facility: CLINIC | Age: 60
End: 2020-01-01

## 2020-01-01 ENCOUNTER — APPOINTMENT (INPATIENT)
Dept: NEUROLOGY | Facility: HOSPITAL | Age: 60
DRG: 133 | End: 2020-01-01
Payer: COMMERCIAL

## 2020-01-01 ENCOUNTER — HOSPITAL ENCOUNTER (EMERGENCY)
Facility: HOSPITAL | Age: 60
End: 2020-01-21
Attending: EMERGENCY MEDICINE | Admitting: EMERGENCY MEDICINE
Payer: COMMERCIAL

## 2020-01-01 ENCOUNTER — APPOINTMENT (INPATIENT)
Dept: NON INVASIVE DIAGNOSTICS | Facility: HOSPITAL | Age: 60
DRG: 133 | End: 2020-01-01
Payer: COMMERCIAL

## 2020-01-01 ENCOUNTER — APPOINTMENT (EMERGENCY)
Dept: RADIOLOGY | Facility: HOSPITAL | Age: 60
End: 2020-01-01
Payer: COMMERCIAL

## 2020-01-01 ENCOUNTER — APPOINTMENT (INPATIENT)
Dept: DIALYSIS | Facility: HOSPITAL | Age: 60
DRG: 133 | End: 2020-01-01
Attending: INTERNAL MEDICINE
Payer: COMMERCIAL

## 2020-01-01 ENCOUNTER — APPOINTMENT (INPATIENT)
Dept: DIALYSIS | Facility: HOSPITAL | Age: 60
DRG: 133 | End: 2020-01-01
Payer: COMMERCIAL

## 2020-01-01 VITALS
SYSTOLIC BLOOD PRESSURE: 186 MMHG | OXYGEN SATURATION: 73 % | RESPIRATION RATE: 3 BRPM | DIASTOLIC BLOOD PRESSURE: 64 MMHG | TEMPERATURE: 81.8 F | WEIGHT: 284.61 LBS | HEART RATE: 30 BPM

## 2020-01-01 VITALS
TEMPERATURE: 92.3 F | DIASTOLIC BLOOD PRESSURE: 62 MMHG | OXYGEN SATURATION: 100 % | SYSTOLIC BLOOD PRESSURE: 113 MMHG | WEIGHT: 296 LBS | HEART RATE: 66 BPM

## 2020-01-01 VITALS
HEART RATE: 96 BPM | SYSTOLIC BLOOD PRESSURE: 128 MMHG | BODY MASS INDEX: 33.37 KG/M2 | DIASTOLIC BLOOD PRESSURE: 62 MMHG | RESPIRATION RATE: 20 BRPM | WEIGHT: 226 LBS | OXYGEN SATURATION: 97 % | TEMPERATURE: 97.4 F

## 2020-01-01 DIAGNOSIS — N17.9 ACUTE RENAL FAILURE, UNSPECIFIED ACUTE RENAL FAILURE TYPE (HCC): Primary | ICD-10-CM

## 2020-01-01 DIAGNOSIS — E11.10 DKA (DIABETIC KETOACIDOSES): ICD-10-CM

## 2020-01-01 DIAGNOSIS — N17.9 ACUTE RENAL FAILURE (ARF) (HCC): ICD-10-CM

## 2020-01-01 DIAGNOSIS — E87.2 METABOLIC ACIDOSIS: ICD-10-CM

## 2020-01-01 DIAGNOSIS — T68.XXXA HYPOTHERMIA: ICD-10-CM

## 2020-01-01 DIAGNOSIS — J96.90 RESPIRATORY FAILURE (HCC): ICD-10-CM

## 2020-01-01 DIAGNOSIS — E87.5 HYPERKALEMIA: ICD-10-CM

## 2020-01-01 DIAGNOSIS — I46.9 CARDIAC ARREST (HCC): Primary | ICD-10-CM

## 2020-01-01 DIAGNOSIS — I46.9 CARDIAC ARREST (HCC): ICD-10-CM

## 2020-01-01 DIAGNOSIS — J44.1 COPD EXACERBATION (HCC): Primary | ICD-10-CM

## 2020-01-01 LAB
ABO GROUP BLD BPU: NORMAL
ABO GROUP BLD BPU: NORMAL
ABO GROUP BLD: NORMAL
ABO GROUP BLD: NORMAL
ALBUMIN SERPL BCP-MCNC: 2.1 G/DL (ref 3.5–5)
ALBUMIN SERPL BCP-MCNC: 2.8 G/DL (ref 3.5–5)
ALBUMIN SERPL BCP-MCNC: 3.6 G/DL (ref 3–5.2)
ALP SERPL-CCNC: 104 U/L (ref 46–116)
ALP SERPL-CCNC: 126 U/L (ref 43–122)
ALP SERPL-CCNC: 129 U/L (ref 46–116)
ALT SERPL W P-5'-P-CCNC: 20 U/L (ref 12–78)
ALT SERPL W P-5'-P-CCNC: 27 U/L (ref 9–52)
ALT SERPL W P-5'-P-CCNC: 39 U/L (ref 12–78)
AMPHETAMINES SERPL QL SCN: NEGATIVE
ANION GAP SERPL CALCULATED.3IONS-SCNC: 10 MMOL/L (ref 4–13)
ANION GAP SERPL CALCULATED.3IONS-SCNC: 10 MMOL/L (ref 4–13)
ANION GAP SERPL CALCULATED.3IONS-SCNC: 11 MMOL/L (ref 4–13)
ANION GAP SERPL CALCULATED.3IONS-SCNC: 12 MMOL/L (ref 4–13)
ANION GAP SERPL CALCULATED.3IONS-SCNC: 12 MMOL/L (ref 4–13)
ANION GAP SERPL CALCULATED.3IONS-SCNC: 14 MMOL/L (ref 5–14)
ANION GAP SERPL CALCULATED.3IONS-SCNC: 8 MMOL/L (ref 4–13)
ANION GAP SERPL CALCULATED.3IONS-SCNC: 9 MMOL/L (ref 4–13)
ANISOCYTOSIS BLD QL SMEAR: PRESENT
APAP SERPL-MCNC: <10 UG/ML (ref 10–20)
ARTERIAL PATENCY WRIST A: NO
AST SERPL W P-5'-P-CCNC: 22 U/L (ref 17–59)
AST SERPL W P-5'-P-CCNC: 43 U/L (ref 5–45)
AST SERPL W P-5'-P-CCNC: 47 U/L (ref 5–45)
ATRIAL RATE: 70 BPM
ATRIAL RATE: 73 BPM
BARBITURATES UR QL: NEGATIVE
BASE EXCESS BLDA CALC-SCNC: -10.3 MMOL/L
BASE EXCESS BLDA CALC-SCNC: -13 MMOL/L
BASE EXCESS BLDA CALC-SCNC: -13.8 MMOL/L (ref -2.1–2.1)
BASE EXCESS BLDA CALC-SCNC: -3.8 MMOL/L
BASE EXCESS BLDA CALC-SCNC: -3.8 MMOL/L
BASE EXCESS BLDA CALC-SCNC: 0.8 MMOL/L
BASOPHILS # BLD AUTO: 0 THOUSANDS/ΜL (ref 0–0.1)
BASOPHILS # BLD AUTO: 0.01 THOUSANDS/ΜL (ref 0–0.1)
BASOPHILS NFR BLD AUTO: 0 % (ref 0–1)
BENZODIAZ UR QL: NEGATIVE
BETA-HYDROXYBUTYRATE: 0.1 MMOL/L
BILIRUB SERPL-MCNC: 0.3 MG/DL
BILIRUB SERPL-MCNC: 0.33 MG/DL (ref 0.2–1)
BILIRUB SERPL-MCNC: 0.35 MG/DL (ref 0.2–1)
BLD GP AB SCN SERPL QL: NEGATIVE
BPU ID: NORMAL
BPU ID: NORMAL
BUN SERPL-MCNC: 60 MG/DL (ref 5–25)
BUN SERPL-MCNC: 67 MG/DL (ref 5–25)
BUN SERPL-MCNC: 73 MG/DL (ref 5–25)
BUN SERPL-MCNC: 77 MG/DL
BUN SERPL-MCNC: 81 MG/DL (ref 5–25)
BUN SERPL-MCNC: 86 MG/DL (ref 5–25)
BUN SERPL-MCNC: 92 MG/DL (ref 5–25)
BUN SERPL-MCNC: 93 MG/DL (ref 5–25)
BURR CELLS BLD QL SMEAR: PRESENT
CA-I BLD-SCNC: 1.02 MMOL/L (ref 1.12–1.32)
CA-I BLD-SCNC: 1.04 MMOL/L (ref 1.12–1.32)
CALCIUM SERPL-MCNC: 6.9 MG/DL (ref 8.3–10.1)
CALCIUM SERPL-MCNC: 7 MG/DL (ref 8.3–10.1)
CALCIUM SERPL-MCNC: 7 MG/DL (ref 8.3–10.1)
CALCIUM SERPL-MCNC: 7.1 MG/DL (ref 8.3–10.1)
CALCIUM SERPL-MCNC: 7.1 MG/DL (ref 8.3–10.1)
CALCIUM SERPL-MCNC: 7.2 MG/DL (ref 8.3–10.1)
CALCIUM SERPL-MCNC: 7.3 MG/DL (ref 8.3–10.1)
CALCIUM SERPL-MCNC: 7.9 MG/DL (ref 8.4–10.2)
CHLORIDE SERPL-SCNC: 106 MMOL/L (ref 100–108)
CHLORIDE SERPL-SCNC: 108 MMOL/L (ref 100–108)
CHLORIDE SERPL-SCNC: 109 MMOL/L (ref 100–108)
CHLORIDE SERPL-SCNC: 109 MMOL/L (ref 100–108)
CHLORIDE SERPL-SCNC: 111 MMOL/L (ref 100–108)
CHLORIDE SERPL-SCNC: 113 MMOL/L (ref 100–108)
CHLORIDE SERPL-SCNC: 113 MMOL/L (ref 97–108)
CHLORIDE SERPL-SCNC: 114 MMOL/L (ref 100–108)
CK MB SERPL-MCNC: 12.9 NG/ML (ref 0–5)
CK MB SERPL-MCNC: 3.1 % (ref 0–2.5)
CK SERPL-CCNC: 418 U/L (ref 39–308)
CO2 SERPL-SCNC: 12 MMOL/L (ref 22–30)
CO2 SERPL-SCNC: 18 MMOL/L (ref 21–32)
CO2 SERPL-SCNC: 18 MMOL/L (ref 21–32)
CO2 SERPL-SCNC: 19 MMOL/L (ref 21–32)
CO2 SERPL-SCNC: 23 MMOL/L (ref 21–32)
CO2 SERPL-SCNC: 24 MMOL/L
CO2 SERPL-SCNC: 25 MMOL/L (ref 21–32)
CO2 SERPL-SCNC: 27 MMOL/L (ref 21–32)
COCAINE UR QL: NEGATIVE
CORTIS SERPL-MCNC: 16.9 UG/DL
CORTIS SERPL-MCNC: 29.6 UG/DL
CREAT SERPL-MCNC: 2.92 MG/DL (ref 0.6–1.3)
CREAT SERPL-MCNC: 3.04 MG/DL (ref 0.6–1.3)
CREAT SERPL-MCNC: 3.04 MG/DL (ref 0.7–1.5)
CREAT SERPL-MCNC: 3.07 MG/DL (ref 0.6–1.3)
CREAT SERPL-MCNC: 3.15 MG/DL (ref 0.6–1.3)
CREAT SERPL-MCNC: 3.23 MG/DL
CREAT SERPL-MCNC: 3.33 MG/DL (ref 0.6–1.3)
CREAT SERPL-MCNC: 3.62 MG/DL (ref 0.6–1.3)
CROSSMATCH: NORMAL
CROSSMATCH: NORMAL
EOSINOPHIL # BLD AUTO: 0 THOUSAND/ΜL (ref 0–0.61)
EOSINOPHIL # BLD AUTO: 0 THOUSAND/ΜL (ref 0–0.61)
EOSINOPHIL # BLD AUTO: 0.01 THOUSAND/ΜL (ref 0–0.61)
EOSINOPHIL # BLD AUTO: 0.01 THOUSAND/ΜL (ref 0–0.61)
EOSINOPHIL NFR BLD AUTO: 0 % (ref 0–6)
ERYTHROCYTE [DISTWIDTH] IN BLOOD BY AUTOMATED COUNT: 15.3 % (ref 11.6–15.1)
ERYTHROCYTE [DISTWIDTH] IN BLOOD BY AUTOMATED COUNT: 16.1 % (ref 11.6–15.1)
ERYTHROCYTE [DISTWIDTH] IN BLOOD BY AUTOMATED COUNT: 16.2 % (ref 11.6–15.1)
ERYTHROCYTE [DISTWIDTH] IN BLOOD BY AUTOMATED COUNT: 16.9 % (ref 11.6–15.1)
ERYTHROCYTE [DISTWIDTH] IN BLOOD BY AUTOMATED COUNT: 17.5 %
ETHANOL SERPL-MCNC: <10 MG/DL (ref 0–10)
ETHYLENE GLYCOL SERPLBLD-MCNC: NEGATIVE UG/ML
FOLATE SERPL-MCNC: 5.2 NG/ML (ref 3.1–17.5)
GFR SERPL CREATININE-BSD FRML MDRD: 10 ML/MIN/1.73SQ M
GFR SERPL CREATININE-BSD FRML MDRD: 10 ML/MIN/1.73SQ M
GFR SERPL CREATININE-BSD FRML MDRD: 11 ML/MIN/1.73SQ M
GFR SERPL CREATININE-BSD FRML MDRD: 12 ML/MIN/1.73SQ M
GFR SERPL CREATININE-BSD FRML MDRD: 18 ML/MIN/1.73SQ M
GFR SERPL CREATININE-BSD FRML MDRD: 21 ML/MIN/1.73SQ M
GLUCOSE SERPL-MCNC: 102 MG/DL (ref 65–140)
GLUCOSE SERPL-MCNC: 104 MG/DL (ref 65–140)
GLUCOSE SERPL-MCNC: 105 MG/DL (ref 65–140)
GLUCOSE SERPL-MCNC: 106 MG/DL (ref 65–140)
GLUCOSE SERPL-MCNC: 106 MG/DL (ref 65–140)
GLUCOSE SERPL-MCNC: 110 MG/DL (ref 65–140)
GLUCOSE SERPL-MCNC: 112 MG/DL (ref 65–140)
GLUCOSE SERPL-MCNC: 124 MG/DL (ref 65–140)
GLUCOSE SERPL-MCNC: 130 MG/DL (ref 65–140)
GLUCOSE SERPL-MCNC: 134 MG/DL (ref 65–140)
GLUCOSE SERPL-MCNC: 145 MG/DL (ref 65–140)
GLUCOSE SERPL-MCNC: 179 MG/DL (ref 65–140)
GLUCOSE SERPL-MCNC: 184 MG/DL (ref 65–140)
GLUCOSE SERPL-MCNC: 192 MG/DL (ref 65–140)
GLUCOSE SERPL-MCNC: 297 MG/DL (ref 65–140)
GLUCOSE SERPL-MCNC: 339 MG/DL (ref 65–140)
GLUCOSE SERPL-MCNC: 353 MG/DL (ref 65–140)
GLUCOSE SERPL-MCNC: 364 MG/DL (ref 70–99)
GLUCOSE SERPL-MCNC: 47 MG/DL (ref 65–140)
GLUCOSE SERPL-MCNC: 47 MG/DL (ref 65–140)
GLUCOSE SERPL-MCNC: 61 MG/DL (ref 65–140)
GLUCOSE SERPL-MCNC: 66 MG/DL (ref 65–140)
GLUCOSE SERPL-MCNC: 78 MG/DL (ref 65–140)
GLUCOSE SERPL-MCNC: 81 MG/DL (ref 65–140)
GLUCOSE SERPL-MCNC: 86 MG/DL (ref 65–140)
GLUCOSE SERPL-MCNC: 89 MG/DL (ref 65–140)
GLUCOSE SERPL-MCNC: 92 MG/DL (ref 65–140)
HBV CORE AB SER QL: NORMAL
HBV CORE IGM SER QL: NORMAL
HBV SURFACE AB SER-ACNC: 9.44 MIU/ML
HBV SURFACE AG SER QL: NORMAL
HCO3 BLDA-SCNC: 15.4 MMOL/L (ref 22–28)
HCO3 BLDA-SCNC: 16 MMOL/L (ref 22–26)
HCO3 BLDA-SCNC: 17.5 MMOL/L (ref 22–28)
HCO3 BLDA-SCNC: 21 MMOL/L (ref 22–28)
HCO3 BLDA-SCNC: 21.5 MMOL/L (ref 22–28)
HCO3 BLDA-SCNC: 24.4 MMOL/L (ref 22–28)
HCT VFR BLD AUTO: 22.2 % (ref 36.5–49.3)
HCT VFR BLD AUTO: 22.9 % (ref 36.5–49.3)
HCT VFR BLD AUTO: 23.4 % (ref 36.5–49.3)
HCT VFR BLD AUTO: 24.1 % (ref 36.5–49.3)
HCT VFR BLD AUTO: 26.4 % (ref 41–53)
HCV AB SER QL: NORMAL
HEMOCCULT STL QL: NEGATIVE
HGB BLD-MCNC: 6.5 G/DL (ref 12–17)
HGB BLD-MCNC: 6.9 G/DL (ref 12–17)
HGB BLD-MCNC: 7.2 G/DL (ref 12–17)
HGB BLD-MCNC: 7.2 G/DL (ref 12–17)
HGB BLD-MCNC: 7.4 G/DL (ref 12–17)
HGB BLD-MCNC: 7.5 G/DL (ref 12–17)
HGB BLD-MCNC: 7.6 G/DL (ref 13.5–17.5)
HOROWITZ INDEX BLDA+IHG-RTO: 100 MM[HG]
HOROWITZ INDEX BLDA+IHG-RTO: 40 MM[HG]
HOROWITZ INDEX BLDA+IHG-RTO: 40 MM[HG]
HOROWITZ INDEX BLDA+IHG-RTO: 60 MM[HG]
HYPERCHROMIA BLD QL SMEAR: PRESENT
IMM GRANULOCYTES # BLD AUTO: 0.01 THOUSAND/UL (ref 0–0.2)
IMM GRANULOCYTES # BLD AUTO: 0.02 THOUSAND/UL (ref 0–0.2)
IMM GRANULOCYTES # BLD AUTO: 0.03 THOUSAND/UL (ref 0–0.2)
IMM GRANULOCYTES # BLD AUTO: 0.04 THOUSAND/UL (ref 0–0.2)
IMM GRANULOCYTES NFR BLD AUTO: 0 % (ref 0–2)
IMM GRANULOCYTES NFR BLD AUTO: 1 % (ref 0–2)
INR PPP: 1.2 (ref 0.84–1.19)
LACTATE SERPL-SCNC: 1.3 MMOL/L (ref 0.5–2)
LACTATE SERPL-SCNC: 1.6 MMOL/L (ref 0.7–2)
LYMPHOCYTES # BLD AUTO: 0.34 THOUSANDS/ΜL (ref 0.6–4.47)
LYMPHOCYTES # BLD AUTO: 0.37 THOUSANDS/ΜL (ref 0.6–4.47)
LYMPHOCYTES # BLD AUTO: 0.75 THOUSANDS/ΜL (ref 0.6–4.47)
LYMPHOCYTES # BLD AUTO: 0.95 THOUSANDS/ΜL (ref 0.6–4.47)
LYMPHOCYTES # BLD AUTO: 1.45 THOUSAND/UL (ref 0.5–4)
LYMPHOCYTES # BLD AUTO: 23 % (ref 25–45)
LYMPHOCYTES NFR BLD AUTO: 10 % (ref 14–44)
LYMPHOCYTES NFR BLD AUTO: 12 % (ref 14–44)
LYMPHOCYTES NFR BLD AUTO: 5 % (ref 14–44)
LYMPHOCYTES NFR BLD AUTO: 6 % (ref 14–44)
MAGNESIUM SERPL-MCNC: 1.9 MG/DL (ref 1.6–2.6)
MAGNESIUM SERPL-MCNC: 2.1 MG/DL (ref 1.6–2.6)
MAGNESIUM SERPL-MCNC: 2.1 MG/DL (ref 1.6–2.6)
MCH RBC QN AUTO: 30.2 PG (ref 26.8–34.3)
MCH RBC QN AUTO: 30.4 PG (ref 26.8–34.3)
MCH RBC QN AUTO: 31.3 PG (ref 26.8–34.3)
MCH RBC QN AUTO: 31.5 PG (ref 26.8–34.3)
MCH RBC QN AUTO: 31.7 PG (ref 26.8–34.3)
MCHC RBC AUTO-ENTMCNC: 29 G/DL (ref 31.4–37.4)
MCHC RBC AUTO-ENTMCNC: 29.3 G/DL (ref 31.4–37.4)
MCHC RBC AUTO-ENTMCNC: 30.1 G/DL (ref 31.4–37.4)
MCHC RBC AUTO-ENTMCNC: 30.7 G/DL (ref 31.4–37.4)
MCHC RBC AUTO-ENTMCNC: 30.8 G/DL (ref 31.4–37.4)
MCV RBC AUTO: 105 FL (ref 82–98)
MCV RBC AUTO: 108 FL (ref 80–100)
MCV RBC AUTO: 108 FL (ref 82–98)
MCV RBC AUTO: 98 FL (ref 82–98)
MCV RBC AUTO: 99 FL (ref 82–98)
METHADONE UR QL: NEGATIVE
METHANOL SERPL-MCNC: NEGATIVE MG/DL
MONOCYTES # BLD AUTO: 0.68 THOUSAND/ΜL (ref 0.17–1.22)
MONOCYTES # BLD AUTO: 0.69 THOUSAND/UL (ref 0.2–0.9)
MONOCYTES # BLD AUTO: 0.85 THOUSAND/ΜL (ref 0.17–1.22)
MONOCYTES # BLD AUTO: 0.9 THOUSAND/ΜL (ref 0.17–1.22)
MONOCYTES # BLD AUTO: 0.9 THOUSAND/ΜL (ref 0.17–1.22)
MONOCYTES NFR BLD AUTO: 11 % (ref 1–10)
MONOCYTES NFR BLD AUTO: 11 % (ref 4–12)
MONOCYTES NFR BLD AUTO: 11 % (ref 4–12)
MONOCYTES NFR BLD AUTO: 12 % (ref 4–12)
MONOCYTES NFR BLD AUTO: 12 % (ref 4–12)
NEUTROPHILS # BLD AUTO: 4.99 THOUSANDS/ΜL (ref 1.85–7.62)
NEUTROPHILS # BLD AUTO: 5.77 THOUSANDS/ΜL (ref 1.85–7.62)
NEUTROPHILS # BLD AUTO: 5.88 THOUSANDS/ΜL (ref 1.85–7.62)
NEUTROPHILS # BLD AUTO: 6.14 THOUSANDS/ΜL (ref 1.85–7.62)
NEUTS SEG # BLD: 4.16 THOUSAND/UL (ref 1.8–7.8)
NEUTS SEG NFR BLD AUTO: 66 %
NEUTS SEG NFR BLD AUTO: 77 % (ref 43–75)
NEUTS SEG NFR BLD AUTO: 77 % (ref 43–75)
NEUTS SEG NFR BLD AUTO: 83 % (ref 43–75)
NEUTS SEG NFR BLD AUTO: 83 % (ref 43–75)
NRBC BLD AUTO-RTO: 0 /100 WBCS
NT-PROBNP SERPL-MCNC: ABNORMAL PG/ML (ref 0–299)
O2 CT BLDA-SCNC: 10 ML/DL (ref 16–23)
O2 CT BLDA-SCNC: 10.5 ML/DL (ref 16–23)
O2 CT BLDA-SCNC: 10.7 ML/DL (ref 16–23)
O2 CT BLDA-SCNC: 10.9 ML/DL (ref 16–23)
O2 CT BLDA-SCNC: 11.5 ML/DL (ref 16–23)
O2 CT BLDA-SCNC: 14.6 ML/DL (ref 16–23)
OPIATES UR QL SCN: NEGATIVE
OSMOLALITY UR/SERPL-RTO: 335 MMOL/KG (ref 282–298)
OXYHGB MFR BLDA: 95.2 % (ref 94–97)
OXYHGB MFR BLDA: 95.9 % (ref 95–98)
OXYHGB MFR BLDA: 96.7 % (ref 94–97)
OXYHGB MFR BLDA: 97.4 % (ref 94–97)
OXYHGB MFR BLDA: 98.7 % (ref 94–97)
OXYHGB MFR BLDA: 98.7 % (ref 94–97)
P AXIS: 35 DEGREES
P AXIS: 43 DEGREES
PCO2 BLDA: 35.4 MM HG (ref 36–44)
PCO2 BLDA: 37 MM HG (ref 36–44)
PCO2 BLDA: 39.9 MM HG (ref 36–44)
PCO2 BLDA: 48.5 MM HG (ref 36–44)
PCO2 BLDA: 48.7 MM HG (ref 36–44)
PCO2 BLDA: 59 MM HG (ref 35–45)
PCP UR QL: NEGATIVE
PEEP RESPIRATORY: 10 CM[H2O]
PEEP RESPIRATORY: 5 CM[H2O]
PH BLDA: 7.04 [PH] (ref 7.35–7.45)
PH BLDA: 7.12 [PH] (ref 7.35–7.45)
PH BLDA: 7.17 [PH] (ref 7.35–7.45)
PH BLDA: 7.35 [PH] (ref 7.35–7.45)
PH BLDA: 7.37 [PH] (ref 7.35–7.45)
PH BLDA: 7.46 [PH] (ref 7.35–7.45)
PHOSPHATE SERPL-MCNC: 5 MG/DL (ref 2.3–4.1)
PLATELET # BLD AUTO: 164 THOUSANDS/UL (ref 149–390)
PLATELET # BLD AUTO: 165 THOUSANDS/UL (ref 149–390)
PLATELET # BLD AUTO: 167 THOUSANDS/UL (ref 149–390)
PLATELET # BLD AUTO: 190 THOUSANDS/UL (ref 149–390)
PLATELET # BLD AUTO: 223 THOUSANDS/UL (ref 150–450)
PLATELET BLD QL SMEAR: ADEQUATE
PMV BLD AUTO: 10.1 FL (ref 8.9–12.7)
PMV BLD AUTO: 10.9 FL (ref 8.9–12.7)
PMV BLD AUTO: 11.3 FL (ref 8.9–12.7)
PMV BLD AUTO: 11.8 FL (ref 8.9–12.7)
PMV BLD AUTO: 9.7 FL (ref 8.9–12.7)
PO2 BLDA: 125.9 MM HG (ref 75–129)
PO2 BLDA: 202 MM HG (ref 75–129)
PO2 BLDA: 293 MM HG (ref 80–105)
PO2 BLDA: 334.2 MM HG (ref 75–129)
PO2 BLDA: 88.9 MM HG (ref 75–129)
PO2 BLDA: 98.3 MM HG (ref 75–129)
POIKILOCYTOSIS BLD QL SMEAR: PRESENT
POTASSIUM SERPL-SCNC: 4 MMOL/L (ref 3.5–5.3)
POTASSIUM SERPL-SCNC: 4.2 MMOL/L (ref 3.5–5.3)
POTASSIUM SERPL-SCNC: 5.1 MMOL/L (ref 3.5–5.3)
POTASSIUM SERPL-SCNC: 5.3 MMOL/L (ref 3.5–5.3)
POTASSIUM SERPL-SCNC: 6.5 MMOL/L (ref 3.5–5.3)
POTASSIUM SERPL-SCNC: 6.6 MMOL/L (ref 3.5–5.3)
POTASSIUM SERPL-SCNC: 6.6 MMOL/L (ref 3.6–5)
POTASSIUM SERPL-SCNC: 6.9 MMOL/L (ref 3.5–5.3)
PR INTERVAL: 150 MS
PR INTERVAL: 232 MS
PROCALCITONIN SERPL-MCNC: 0.23 NG/ML
PROCALCITONIN SERPL-MCNC: 0.42 NG/ML
PROT SERPL-MCNC: 5.2 G/DL (ref 6.4–8.2)
PROT SERPL-MCNC: 6 G/DL (ref 6.4–8.2)
PROT SERPL-MCNC: 6.5 G/DL (ref 5.9–8.4)
PROTHROMBIN TIME: 15.4 SECONDS (ref 11.6–14.5)
QRS AXIS: -1 DEGREES
QRS AXIS: 5 DEGREES
QRSD INTERVAL: 90 MS
QRSD INTERVAL: 96 MS
QT INTERVAL: 392 MS
QT INTERVAL: 410 MS
QTC INTERVAL: 431 MS
QTC INTERVAL: 442 MS
RBC # BLD AUTO: 2.05 MILLION/UL (ref 3.88–5.62)
RBC # BLD AUTO: 2.19 MILLION/UL (ref 3.88–5.62)
RBC # BLD AUTO: 2.37 MILLION/UL (ref 3.88–5.62)
RBC # BLD AUTO: 2.44 MILLION/UL (ref 4.5–5.9)
RBC # BLD AUTO: 2.45 MILLION/UL (ref 3.88–5.62)
RBC MORPH BLD: ABNORMAL
RH BLD: POSITIVE
RH BLD: POSITIVE
SALICYLATES SERPL-MCNC: <1 MG/DL (ref 10–30)
SODIUM SERPL-SCNC: 139 MMOL/L (ref 137–147)
SODIUM SERPL-SCNC: 141 MMOL/L (ref 136–145)
SODIUM SERPL-SCNC: 142 MMOL/L (ref 136–145)
SODIUM SERPL-SCNC: 143 MMOL/L (ref 136–145)
SPECIMEN EXPIRATION DATE: NORMAL
SPECIMEN SOURCE: ABNORMAL
T WAVE AXIS: 129 DEGREES
T WAVE AXIS: 55 DEGREES
THC UR QL: NEGATIVE
TOTAL CELLS COUNTED SPEC: 100
TROPONIN I SERPL-MCNC: 0.06 NG/ML
TROPONIN I SERPL-MCNC: 0.06 NG/ML (ref 0–0.03)
TROPONIN I SERPL-MCNC: 0.07 NG/ML
TROPONIN I SERPL-MCNC: 0.07 NG/ML
TSH SERPL DL<=0.05 MIU/L-ACNC: 2.04 UIU/ML (ref 0.36–3.74)
TSH SERPL DL<=0.05 MIU/L-ACNC: 5.49 UIU/ML (ref 0.47–4.68)
UNIT DISPENSE STATUS: NORMAL
UNIT DISPENSE STATUS: NORMAL
UNIT PRODUCT CODE: NORMAL
UNIT PRODUCT CODE: NORMAL
UNIT RH: NORMAL
UNIT RH: NORMAL
VENT AC: 18
VENT AC: 22
VENT- AC: AC
VENTRICULAR RATE: 70 BPM
VENTRICULAR RATE: 73 BPM
VIT B12 SERPL-MCNC: 268 PG/ML (ref 100–900)
VT SETTING VENT: 390 ML
VT SETTING VENT: 450 ML
WBC # BLD AUTO: 6.06 THOUSAND/UL (ref 4.31–10.16)
WBC # BLD AUTO: 6.3 THOUSAND/UL (ref 4.31–10.16)
WBC # BLD AUTO: 6.99 THOUSAND/UL (ref 4.31–10.16)
WBC # BLD AUTO: 7.58 THOUSAND/UL (ref 4.31–10.16)
WBC # BLD AUTO: 8.04 THOUSAND/UL (ref 4.31–10.16)

## 2020-01-01 PROCEDURE — 84484 ASSAY OF TROPONIN QUANT: CPT | Performed by: EMERGENCY MEDICINE

## 2020-01-01 PROCEDURE — 71045 X-RAY EXAM CHEST 1 VIEW: CPT

## 2020-01-01 PROCEDURE — 86704 HEP B CORE ANTIBODY TOTAL: CPT | Performed by: NURSE PRACTITIONER

## 2020-01-01 PROCEDURE — 85025 COMPLETE CBC W/AUTO DIFF WBC: CPT | Performed by: INTERNAL MEDICINE

## 2020-01-01 PROCEDURE — 94760 N-INVAS EAR/PLS OXIMETRY 1: CPT

## 2020-01-01 PROCEDURE — 85027 COMPLETE CBC AUTOMATED: CPT | Performed by: EMERGENCY MEDICINE

## 2020-01-01 PROCEDURE — 84484 ASSAY OF TROPONIN QUANT: CPT | Performed by: NURSE PRACTITIONER

## 2020-01-01 PROCEDURE — 82553 CREATINE MB FRACTION: CPT | Performed by: INTERNAL MEDICINE

## 2020-01-01 PROCEDURE — P9016 RBC LEUKOCYTES REDUCED: HCPCS

## 2020-01-01 PROCEDURE — 82948 REAGENT STRIP/BLOOD GLUCOSE: CPT

## 2020-01-01 PROCEDURE — 80320 DRUG SCREEN QUANTALCOHOLS: CPT | Performed by: EMERGENCY MEDICINE

## 2020-01-01 PROCEDURE — 82805 BLOOD GASES W/O2 SATURATION: CPT | Performed by: INTERNAL MEDICINE

## 2020-01-01 PROCEDURE — 86803 HEPATITIS C AB TEST: CPT | Performed by: NURSE PRACTITIONER

## 2020-01-01 PROCEDURE — 82533 TOTAL CORTISOL: CPT | Performed by: NURSE PRACTITIONER

## 2020-01-01 PROCEDURE — NC001 PR NO CHARGE: Performed by: NURSE PRACTITIONER

## 2020-01-01 PROCEDURE — 86920 COMPATIBILITY TEST SPIN: CPT

## 2020-01-01 PROCEDURE — 84100 ASSAY OF PHOSPHORUS: CPT | Performed by: INTERNAL MEDICINE

## 2020-01-01 PROCEDURE — 96361 HYDRATE IV INFUSION ADD-ON: CPT

## 2020-01-01 PROCEDURE — 96360 HYDRATION IV INFUSION INIT: CPT

## 2020-01-01 PROCEDURE — 82533 TOTAL CORTISOL: CPT | Performed by: INTERNAL MEDICINE

## 2020-01-01 PROCEDURE — 83735 ASSAY OF MAGNESIUM: CPT | Performed by: NURSE PRACTITIONER

## 2020-01-01 PROCEDURE — 82693 ASSAY OF ETHYLENE GLYCOL: CPT | Performed by: NURSE PRACTITIONER

## 2020-01-01 PROCEDURE — 84443 ASSAY THYROID STIM HORMONE: CPT | Performed by: EMERGENCY MEDICINE

## 2020-01-01 PROCEDURE — 93010 ELECTROCARDIOGRAM REPORT: CPT | Performed by: INTERNAL MEDICINE

## 2020-01-01 PROCEDURE — 90935 HEMODIALYSIS ONE EVALUATION: CPT | Performed by: INTERNAL MEDICINE

## 2020-01-01 PROCEDURE — 99255 IP/OBS CONSLTJ NEW/EST HI 80: CPT | Performed by: INTERNAL MEDICINE

## 2020-01-01 PROCEDURE — 86901 BLOOD TYPING SEROLOGIC RH(D): CPT | Performed by: INTERNAL MEDICINE

## 2020-01-01 PROCEDURE — 99291 CRITICAL CARE FIRST HOUR: CPT | Performed by: EMERGENCY MEDICINE

## 2020-01-01 PROCEDURE — 86901 BLOOD TYPING SEROLOGIC RH(D): CPT | Performed by: NURSE PRACTITIONER

## 2020-01-01 PROCEDURE — 80053 COMPREHEN METABOLIC PANEL: CPT | Performed by: EMERGENCY MEDICINE

## 2020-01-01 PROCEDURE — 99291 CRITICAL CARE FIRST HOUR: CPT | Performed by: INTERNAL MEDICINE

## 2020-01-01 PROCEDURE — 94002 VENT MGMT INPAT INIT DAY: CPT

## 2020-01-01 PROCEDURE — 82805 BLOOD GASES W/O2 SATURATION: CPT | Performed by: EMERGENCY MEDICINE

## 2020-01-01 PROCEDURE — 93306 TTE W/DOPPLER COMPLETE: CPT

## 2020-01-01 PROCEDURE — 83605 ASSAY OF LACTIC ACID: CPT | Performed by: NURSE PRACTITIONER

## 2020-01-01 PROCEDURE — 94003 VENT MGMT INPAT SUBQ DAY: CPT

## 2020-01-01 PROCEDURE — 80048 BASIC METABOLIC PNL TOTAL CA: CPT | Performed by: NURSE PRACTITIONER

## 2020-01-01 PROCEDURE — 95822 EEG COMA OR SLEEP ONLY: CPT | Performed by: PSYCHIATRY & NEUROLOGY

## 2020-01-01 PROCEDURE — 86900 BLOOD TYPING SEROLOGIC ABO: CPT | Performed by: INTERNAL MEDICINE

## 2020-01-01 PROCEDURE — C9113 INJ PANTOPRAZOLE SODIUM, VIA: HCPCS | Performed by: NURSE PRACTITIONER

## 2020-01-01 PROCEDURE — 02HV33Z INSERTION OF INFUSION DEVICE INTO SUPERIOR VENA CAVA, PERCUTANEOUS APPROACH: ICD-10-PCS | Performed by: INTERNAL MEDICINE

## 2020-01-01 PROCEDURE — 94640 AIRWAY INHALATION TREATMENT: CPT | Performed by: FAMILY MEDICINE

## 2020-01-01 PROCEDURE — 99232 SBSQ HOSP IP/OBS MODERATE 35: CPT | Performed by: PHYSICIAN ASSISTANT

## 2020-01-01 PROCEDURE — 86705 HEP B CORE ANTIBODY IGM: CPT | Performed by: NURSE PRACTITIONER

## 2020-01-01 PROCEDURE — 86900 BLOOD TYPING SEROLOGIC ABO: CPT | Performed by: NURSE PRACTITIONER

## 2020-01-01 PROCEDURE — 99231 SBSQ HOSP IP/OBS SF/LOW 25: CPT | Performed by: NURSE PRACTITIONER

## 2020-01-01 PROCEDURE — 99238 HOSP IP/OBS DSCHRG MGMT 30/<: CPT | Performed by: NURSE PRACTITIONER

## 2020-01-01 PROCEDURE — 86706 HEP B SURFACE ANTIBODY: CPT | Performed by: NURSE PRACTITIONER

## 2020-01-01 PROCEDURE — 94640 AIRWAY INHALATION TREATMENT: CPT

## 2020-01-01 PROCEDURE — 86850 RBC ANTIBODY SCREEN: CPT | Performed by: NURSE PRACTITIONER

## 2020-01-01 PROCEDURE — 99292 CRITICAL CARE ADDL 30 MIN: CPT | Performed by: EMERGENCY MEDICINE

## 2020-01-01 PROCEDURE — 92950 HEART/LUNG RESUSCITATION CPR: CPT

## 2020-01-01 PROCEDURE — 99292 CRITICAL CARE ADDL 30 MIN: CPT | Performed by: INTERNAL MEDICINE

## 2020-01-01 PROCEDURE — 96365 THER/PROPH/DIAG IV INF INIT: CPT

## 2020-01-01 PROCEDURE — 85007 BL SMEAR W/DIFF WBC COUNT: CPT | Performed by: EMERGENCY MEDICINE

## 2020-01-01 PROCEDURE — 99232 SBSQ HOSP IP/OBS MODERATE 35: CPT | Performed by: INTERNAL MEDICINE

## 2020-01-01 PROCEDURE — 5A1945Z RESPIRATORY VENTILATION, 24-96 CONSECUTIVE HOURS: ICD-10-PCS | Performed by: STUDENT IN AN ORGANIZED HEALTH CARE EDUCATION/TRAINING PROGRAM

## 2020-01-01 PROCEDURE — 82746 ASSAY OF FOLIC ACID SERUM: CPT | Performed by: INTERNAL MEDICINE

## 2020-01-01 PROCEDURE — 84145 PROCALCITONIN (PCT): CPT | Performed by: NURSE PRACTITIONER

## 2020-01-01 PROCEDURE — 96366 THER/PROPH/DIAG IV INF ADDON: CPT

## 2020-01-01 PROCEDURE — 82330 ASSAY OF CALCIUM: CPT | Performed by: NURSE PRACTITIONER

## 2020-01-01 PROCEDURE — 70450 CT HEAD/BRAIN W/O DYE: CPT

## 2020-01-01 PROCEDURE — 96375 TX/PRO/DX INJ NEW DRUG ADDON: CPT

## 2020-01-01 PROCEDURE — NC001 PR NO CHARGE: Performed by: EMERGENCY MEDICINE

## 2020-01-01 PROCEDURE — 85018 HEMOGLOBIN: CPT | Performed by: INTERNAL MEDICINE

## 2020-01-01 PROCEDURE — 30233N1 TRANSFUSION OF NONAUTOLOGOUS RED BLOOD CELLS INTO PERIPHERAL VEIN, PERCUTANEOUS APPROACH: ICD-10-PCS | Performed by: INTERNAL MEDICINE

## 2020-01-01 PROCEDURE — 80048 BASIC METABOLIC PNL TOTAL CA: CPT | Performed by: INTERNAL MEDICINE

## 2020-01-01 PROCEDURE — 85610 PROTHROMBIN TIME: CPT | Performed by: NURSE PRACTITIONER

## 2020-01-01 PROCEDURE — 82805 BLOOD GASES W/O2 SATURATION: CPT | Performed by: NURSE PRACTITIONER

## 2020-01-01 PROCEDURE — 83880 ASSAY OF NATRIURETIC PEPTIDE: CPT | Performed by: EMERGENCY MEDICINE

## 2020-01-01 PROCEDURE — 5A1D70Z PERFORMANCE OF URINARY FILTRATION, INTERMITTENT, LESS THAN 6 HOURS PER DAY: ICD-10-PCS | Performed by: INTERNAL MEDICINE

## 2020-01-01 PROCEDURE — 99285 EMERGENCY DEPT VISIT HI MDM: CPT

## 2020-01-01 PROCEDURE — 36620 INSERTION CATHETER ARTERY: CPT

## 2020-01-01 PROCEDURE — 99255 IP/OBS CONSLTJ NEW/EST HI 80: CPT | Performed by: PSYCHIATRY & NEUROLOGY

## 2020-01-01 PROCEDURE — 36556 INSERT NON-TUNNEL CV CATH: CPT | Performed by: INTERNAL MEDICINE

## 2020-01-01 PROCEDURE — 83735 ASSAY OF MAGNESIUM: CPT | Performed by: INTERNAL MEDICINE

## 2020-01-01 PROCEDURE — 82010 KETONE BODYS QUAN: CPT | Performed by: EMERGENCY MEDICINE

## 2020-01-01 PROCEDURE — 99233 SBSQ HOSP IP/OBS HIGH 50: CPT | Performed by: INTERNAL MEDICINE

## 2020-01-01 PROCEDURE — 85025 COMPLETE CBC W/AUTO DIFF WBC: CPT | Performed by: NURSE PRACTITIONER

## 2020-01-01 PROCEDURE — 80329 ANALGESICS NON-OPIOID 1 OR 2: CPT | Performed by: EMERGENCY MEDICINE

## 2020-01-01 PROCEDURE — 95816 EEG AWAKE AND DROWSY: CPT

## 2020-01-01 PROCEDURE — 84443 ASSAY THYROID STIM HORMONE: CPT | Performed by: NURSE PRACTITIONER

## 2020-01-01 PROCEDURE — 99239 HOSP IP/OBS DSCHRG MGMT >30: CPT | Performed by: STUDENT IN AN ORGANIZED HEALTH CARE EDUCATION/TRAINING PROGRAM

## 2020-01-01 PROCEDURE — 80053 COMPREHEN METABOLIC PANEL: CPT | Performed by: NURSE PRACTITIONER

## 2020-01-01 PROCEDURE — NC001 PR NO CHARGE: Performed by: INTERNAL MEDICINE

## 2020-01-01 PROCEDURE — 36415 COLL VENOUS BLD VENIPUNCTURE: CPT | Performed by: EMERGENCY MEDICINE

## 2020-01-01 PROCEDURE — 82607 VITAMIN B-12: CPT | Performed by: INTERNAL MEDICINE

## 2020-01-01 PROCEDURE — 83605 ASSAY OF LACTIC ACID: CPT | Performed by: EMERGENCY MEDICINE

## 2020-01-01 PROCEDURE — 96376 TX/PRO/DX INJ SAME DRUG ADON: CPT

## 2020-01-01 PROCEDURE — 82272 OCCULT BLD FECES 1-3 TESTS: CPT | Performed by: NURSE PRACTITIONER

## 2020-01-01 PROCEDURE — 93005 ELECTROCARDIOGRAM TRACING: CPT

## 2020-01-01 PROCEDURE — 99213 OFFICE O/P EST LOW 20 MIN: CPT | Performed by: FAMILY MEDICINE

## 2020-01-01 PROCEDURE — 83930 ASSAY OF BLOOD OSMOLALITY: CPT | Performed by: EMERGENCY MEDICINE

## 2020-01-01 PROCEDURE — 80320 DRUG SCREEN QUANTALCOHOLS: CPT | Performed by: NURSE PRACTITIONER

## 2020-01-01 PROCEDURE — 92950 HEART/LUNG RESUSCITATION CPR: CPT | Performed by: EMERGENCY MEDICINE

## 2020-01-01 PROCEDURE — 80307 DRUG TEST PRSMV CHEM ANLYZR: CPT | Performed by: EMERGENCY MEDICINE

## 2020-01-01 PROCEDURE — 87340 HEPATITIS B SURFACE AG IA: CPT | Performed by: NURSE PRACTITIONER

## 2020-01-01 PROCEDURE — 96368 THER/DIAG CONCURRENT INF: CPT

## 2020-01-01 PROCEDURE — 82550 ASSAY OF CK (CPK): CPT | Performed by: INTERNAL MEDICINE

## 2020-01-01 RX ORDER — ALBUTEROL SULFATE 90 UG/1
2 AEROSOL, METERED RESPIRATORY (INHALATION) EVERY 6 HOURS PRN
Qty: 1 INHALER | Refills: 5 | Status: SHIPPED | OUTPATIENT
Start: 2020-01-01

## 2020-01-01 RX ORDER — CHLORHEXIDINE GLUCONATE 0.12 MG/ML
15 RINSE ORAL EVERY 12 HOURS SCHEDULED
Status: DISCONTINUED | OUTPATIENT
Start: 2020-01-01 | End: 2020-01-01

## 2020-01-01 RX ORDER — AZITHROMYCIN 250 MG/1
TABLET, FILM COATED ORAL
Qty: 6 TABLET | Refills: 0 | Status: SHIPPED | OUTPATIENT
Start: 2020-01-01 | End: 2020-01-01

## 2020-01-01 RX ORDER — DEXTROSE MONOHYDRATE 25 G/50ML
25 INJECTION, SOLUTION INTRAVENOUS ONCE
Status: COMPLETED | OUTPATIENT
Start: 2020-01-01 | End: 2020-01-01

## 2020-01-01 RX ORDER — SODIUM CHLORIDE 9 MG/ML
250 INJECTION, SOLUTION INTRAVENOUS CONTINUOUS
Status: DISCONTINUED | OUTPATIENT
Start: 2020-01-01 | End: 2020-01-01 | Stop reason: HOSPADM

## 2020-01-01 RX ORDER — ACETAMINOPHEN 160 MG/5ML
650 SUSPENSION, ORAL (FINAL DOSE FORM) ORAL EVERY 6 HOURS PRN
Status: DISCONTINUED | OUTPATIENT
Start: 2020-01-01 | End: 2020-01-01 | Stop reason: HOSPADM

## 2020-01-01 RX ORDER — DEXTROSE MONOHYDRATE 50 MG/ML
50 INJECTION, SOLUTION INTRAVENOUS CONTINUOUS
Status: DISCONTINUED | OUTPATIENT
Start: 2020-01-01 | End: 2020-01-01

## 2020-01-01 RX ORDER — SODIUM CHLORIDE 9 MG/ML
1000 INJECTION, SOLUTION INTRAVENOUS CONTINUOUS
Status: DISPENSED | OUTPATIENT
Start: 2020-01-01 | End: 2020-01-01

## 2020-01-01 RX ORDER — SODIUM CHLORIDE FOR INHALATION 0.9 %
3 VIAL, NEBULIZER (ML) INHALATION
Status: DISCONTINUED | OUTPATIENT
Start: 2020-01-01 | End: 2020-01-01

## 2020-01-01 RX ORDER — LORAZEPAM 2 MG/ML
2 INJECTION INTRAMUSCULAR EVERY 4 HOURS PRN
Status: DISCONTINUED | OUTPATIENT
Start: 2020-01-01 | End: 2020-01-01

## 2020-01-01 RX ORDER — SODIUM CHLORIDE 9 MG/ML
500 INJECTION, SOLUTION INTRAVENOUS CONTINUOUS
Status: DISCONTINUED | OUTPATIENT
Start: 2020-01-01 | End: 2020-01-01 | Stop reason: HOSPADM

## 2020-01-01 RX ORDER — PROPOFOL 10 MG/ML
INJECTION, EMULSION INTRAVENOUS
Status: COMPLETED
Start: 2020-01-01 | End: 2020-01-01

## 2020-01-01 RX ORDER — PANTOPRAZOLE SODIUM 40 MG/1
40 INJECTION, POWDER, FOR SOLUTION INTRAVENOUS
Status: DISCONTINUED | OUTPATIENT
Start: 2020-01-01 | End: 2020-01-01

## 2020-01-01 RX ORDER — SODIUM CHLORIDE 9 MG/ML
500 INJECTION, SOLUTION INTRAVENOUS CONTINUOUS
Status: DISCONTINUED | OUTPATIENT
Start: 2020-01-01 | End: 2020-01-01

## 2020-01-01 RX ORDER — PROPOFOL 10 MG/ML
5-50 INJECTION, EMULSION INTRAVENOUS
Status: DISCONTINUED | OUTPATIENT
Start: 2020-01-01 | End: 2020-01-01

## 2020-01-01 RX ORDER — HYDRALAZINE HYDROCHLORIDE 20 MG/ML
20 INJECTION INTRAMUSCULAR; INTRAVENOUS EVERY 6 HOURS PRN
Status: DISCONTINUED | OUTPATIENT
Start: 2020-01-01 | End: 2020-01-01

## 2020-01-01 RX ORDER — DEXTROSE MONOHYDRATE 25 G/50ML
50 INJECTION, SOLUTION INTRAVENOUS ONCE
Status: COMPLETED | OUTPATIENT
Start: 2020-01-01 | End: 2020-01-01

## 2020-01-01 RX ORDER — PREDNISONE 20 MG/1
40 TABLET ORAL DAILY
Qty: 10 TABLET | Refills: 0 | Status: SHIPPED | OUTPATIENT
Start: 2020-01-01 | End: 2020-01-01

## 2020-01-01 RX ORDER — HYDRALAZINE HYDROCHLORIDE 20 MG/ML
5 INJECTION INTRAMUSCULAR; INTRAVENOUS ONCE
Status: COMPLETED | OUTPATIENT
Start: 2020-01-01 | End: 2020-01-01

## 2020-01-01 RX ORDER — CALCIUM GLUCONATE 20 MG/ML
2 INJECTION, SOLUTION INTRAVENOUS ONCE
Status: COMPLETED | OUTPATIENT
Start: 2020-01-01 | End: 2020-01-01

## 2020-01-01 RX ORDER — DEXTROSE AND SODIUM CHLORIDE 5; .9 G/100ML; G/100ML
250 INJECTION, SOLUTION INTRAVENOUS CONTINUOUS
Status: DISCONTINUED | OUTPATIENT
Start: 2020-01-01 | End: 2020-01-01

## 2020-01-01 RX ORDER — FUROSEMIDE 10 MG/ML
80 INJECTION INTRAMUSCULAR; INTRAVENOUS ONCE
Status: COMPLETED | OUTPATIENT
Start: 2020-01-01 | End: 2020-01-01

## 2020-01-01 RX ORDER — LORAZEPAM 2 MG/ML
2 INJECTION INTRAMUSCULAR ONCE
Status: COMPLETED | OUTPATIENT
Start: 2020-01-01 | End: 2020-01-01

## 2020-01-01 RX ORDER — SODIUM CHLORIDE 9 MG/ML
250 INJECTION, SOLUTION INTRAVENOUS CONTINUOUS
Status: DISCONTINUED | OUTPATIENT
Start: 2020-01-01 | End: 2020-01-01

## 2020-01-01 RX ORDER — METOPROLOL TARTRATE 5 MG/5ML
5 INJECTION INTRAVENOUS EVERY 6 HOURS PRN
Status: DISCONTINUED | OUTPATIENT
Start: 2020-01-01 | End: 2020-01-01

## 2020-01-01 RX ORDER — HEPARIN SODIUM 5000 [USP'U]/ML
5000 INJECTION, SOLUTION INTRAVENOUS; SUBCUTANEOUS EVERY 8 HOURS SCHEDULED
Status: DISCONTINUED | OUTPATIENT
Start: 2020-01-01 | End: 2020-01-01

## 2020-01-01 RX ORDER — IPRATROPIUM BROMIDE AND ALBUTEROL SULFATE 2.5; .5 MG/3ML; MG/3ML
3 SOLUTION RESPIRATORY (INHALATION) ONCE
Status: COMPLETED | OUTPATIENT
Start: 2020-01-01 | End: 2020-01-01

## 2020-01-01 RX ORDER — DEXTROSE AND SODIUM CHLORIDE 5; .9 G/100ML; G/100ML
250 INJECTION, SOLUTION INTRAVENOUS CONTINUOUS
Status: DISCONTINUED | OUTPATIENT
Start: 2020-01-01 | End: 2020-01-01 | Stop reason: HOSPADM

## 2020-01-01 RX ORDER — SODIUM CHLORIDE 9 MG/ML
1000 INJECTION, SOLUTION INTRAVENOUS CONTINUOUS
Status: DISCONTINUED | OUTPATIENT
Start: 2020-01-01 | End: 2020-01-01

## 2020-01-01 RX ORDER — CEFEPIME HYDROCHLORIDE 2 G/50ML
2000 INJECTION, SOLUTION INTRAVENOUS ONCE
Status: COMPLETED | OUTPATIENT
Start: 2020-01-01 | End: 2020-01-01

## 2020-01-01 RX ORDER — LEVALBUTEROL 1.25 MG/.5ML
1.25 SOLUTION, CONCENTRATE RESPIRATORY (INHALATION)
Status: DISCONTINUED | OUTPATIENT
Start: 2020-01-01 | End: 2020-01-01

## 2020-01-01 RX ADMIN — PANTOPRAZOLE SODIUM 40 MG: 40 INJECTION, POWDER, FOR SOLUTION INTRAVENOUS at 09:49

## 2020-01-01 RX ADMIN — HYDRALAZINE HYDROCHLORIDE 5 MG: 20 INJECTION INTRAMUSCULAR; INTRAVENOUS at 06:30

## 2020-01-01 RX ADMIN — FUROSEMIDE 80 MG: 10 INJECTION, SOLUTION INTRAMUSCULAR; INTRAVENOUS at 11:37

## 2020-01-01 RX ADMIN — Medication 5 MG/HR: at 19:29

## 2020-01-01 RX ADMIN — PROPOFOL 30 MCG/KG/MIN: 10 INJECTION, EMULSION INTRAVENOUS at 08:38

## 2020-01-01 RX ADMIN — METRONIDAZOLE 500 MG: 500 INJECTION, SOLUTION INTRAVENOUS at 08:52

## 2020-01-01 RX ADMIN — LEVETIRACETAM 500 MG: 100 INJECTION, SOLUTION INTRAVENOUS at 02:04

## 2020-01-01 RX ADMIN — CHLORHEXIDINE GLUCONATE 0.12% ORAL RINSE 15 ML: 1.2 LIQUID ORAL at 20:00

## 2020-01-01 RX ADMIN — LORAZEPAM 2 MG: 2 INJECTION INTRAMUSCULAR; INTRAVENOUS at 12:34

## 2020-01-01 RX ADMIN — HEPARIN SODIUM 5000 UNITS: 5000 INJECTION INTRAVENOUS; SUBCUTANEOUS at 05:40

## 2020-01-01 RX ADMIN — FOMEPIZOLE 2000 MG: 1 INJECTION, SOLUTION INTRAVENOUS at 21:25

## 2020-01-01 RX ADMIN — HEPARIN SODIUM 5000 UNITS: 5000 INJECTION INTRAVENOUS; SUBCUTANEOUS at 05:03

## 2020-01-01 RX ADMIN — VANCOMYCIN 2000 MG: 1 INJECTION, SOLUTION INTRAVENOUS at 21:28

## 2020-01-01 RX ADMIN — LEVETIRACETAM 1000 MG: 100 INJECTION, SOLUTION INTRAVENOUS at 08:49

## 2020-01-01 RX ADMIN — SODIUM BICARBONATE 125 ML/HR: 84 INJECTION, SOLUTION INTRAVENOUS at 07:34

## 2020-01-01 RX ADMIN — HYDRALAZINE HYDROCHLORIDE 20 MG: 20 INJECTION INTRAMUSCULAR; INTRAVENOUS at 17:24

## 2020-01-01 RX ADMIN — METRONIDAZOLE 500 MG: 500 INJECTION, SOLUTION INTRAVENOUS at 07:33

## 2020-01-01 RX ADMIN — LEVETIRACETAM 500 MG: 100 INJECTION, SOLUTION INTRAVENOUS at 08:04

## 2020-01-01 RX ADMIN — METOPROLOL TARTRATE 5 MG: 5 INJECTION INTRAVENOUS at 15:32

## 2020-01-01 RX ADMIN — HEPARIN SODIUM 5000 UNITS: 5000 INJECTION INTRAVENOUS; SUBCUTANEOUS at 22:16

## 2020-01-01 RX ADMIN — SODIUM CHLORIDE 10 UNITS: 9 INJECTION, SOLUTION INTRAMUSCULAR; INTRAVENOUS; SUBCUTANEOUS at 01:10

## 2020-01-01 RX ADMIN — CEFEPIME HYDROCHLORIDE 1000 MG: 1 INJECTION, POWDER, FOR SOLUTION INTRAMUSCULAR; INTRAVENOUS at 10:27

## 2020-01-01 RX ADMIN — FOMEPIZOLE 2000 MG: 1 INJECTION, SOLUTION INTRAVENOUS at 13:20

## 2020-01-01 RX ADMIN — Medication 20 MG/HR: at 19:40

## 2020-01-01 RX ADMIN — METRONIDAZOLE 500 MG: 500 INJECTION, SOLUTION INTRAVENOUS at 00:04

## 2020-01-01 RX ADMIN — SODIUM BICARBONATE 50 MEQ: 84 INJECTION, SOLUTION INTRAVENOUS at 16:50

## 2020-01-01 RX ADMIN — SODIUM BICARBONATE 100 ML/HR: 84 INJECTION, SOLUTION INTRAVENOUS at 21:55

## 2020-01-01 RX ADMIN — PANTOPRAZOLE SODIUM 40 MG: 40 INJECTION, POWDER, FOR SOLUTION INTRAVENOUS at 08:52

## 2020-01-01 RX ADMIN — HEPARIN SODIUM 5000 UNITS: 5000 INJECTION INTRAVENOUS; SUBCUTANEOUS at 13:43

## 2020-01-01 RX ADMIN — SODIUM CHLORIDE 1000 ML/HR: 0.9 INJECTION, SOLUTION INTRAVENOUS at 18:14

## 2020-01-01 RX ADMIN — METRONIDAZOLE 500 MG: 500 INJECTION, SOLUTION INTRAVENOUS at 01:07

## 2020-01-01 RX ADMIN — HEPARIN SODIUM 5000 UNITS: 5000 INJECTION INTRAVENOUS; SUBCUTANEOUS at 01:06

## 2020-01-01 RX ADMIN — Medication 125 ML/HR: at 17:43

## 2020-01-01 RX ADMIN — PERFLUTREN 0.4 ML/MIN: 6.52 INJECTION, SUSPENSION INTRAVENOUS at 09:07

## 2020-01-01 RX ADMIN — PHENOBARBITAL SODIUM 260 MG: 65 INJECTION INTRAMUSCULAR; INTRAVENOUS at 22:36

## 2020-01-01 RX ADMIN — CEFEPIME HYDROCHLORIDE 1000 MG: 1 INJECTION, POWDER, FOR SOLUTION INTRAMUSCULAR; INTRAVENOUS at 08:05

## 2020-01-01 RX ADMIN — SODIUM CHLORIDE 1000 ML/HR: 0.9 INJECTION, SOLUTION INTRAVENOUS at 19:35

## 2020-01-01 RX ADMIN — HYDRALAZINE HYDROCHLORIDE 20 MG: 20 INJECTION INTRAMUSCULAR; INTRAVENOUS at 10:25

## 2020-01-01 RX ADMIN — PROPOFOL 15 MCG/KG/MIN: 10 INJECTION, EMULSION INTRAVENOUS at 05:41

## 2020-01-01 RX ADMIN — HEPARIN SODIUM 5000 UNITS: 5000 INJECTION INTRAVENOUS; SUBCUTANEOUS at 15:17

## 2020-01-01 RX ADMIN — HEPARIN SODIUM 5000 UNITS: 5000 INJECTION INTRAVENOUS; SUBCUTANEOUS at 21:16

## 2020-01-01 RX ADMIN — CALCIUM GLUCONATE 2 G: 20 INJECTION, SOLUTION INTRAVENOUS at 01:06

## 2020-01-01 RX ADMIN — CEFEPIME HYDROCHLORIDE 2000 MG: 2 INJECTION, SOLUTION INTRAVENOUS at 21:06

## 2020-01-01 RX ADMIN — LORAZEPAM 2 MG: 2 INJECTION INTRAMUSCULAR; INTRAVENOUS at 22:15

## 2020-01-01 RX ADMIN — FOMEPIZOLE 1400 MG: 1 INJECTION, SOLUTION INTRAVENOUS at 00:55

## 2020-01-01 RX ADMIN — CHLORHEXIDINE GLUCONATE 0.12% ORAL RINSE 15 ML: 1.2 LIQUID ORAL at 07:32

## 2020-01-01 RX ADMIN — FUROSEMIDE 80 MG: 10 INJECTION, SOLUTION INTRAMUSCULAR; INTRAVENOUS at 15:27

## 2020-01-01 RX ADMIN — METOPROLOL TARTRATE 25 MG: 25 TABLET ORAL at 10:12

## 2020-01-01 RX ADMIN — Medication 13.4 UNITS/HR: at 18:49

## 2020-01-01 RX ADMIN — CHLORHEXIDINE GLUCONATE 0.12% ORAL RINSE 15 ML: 1.2 LIQUID ORAL at 08:51

## 2020-01-01 RX ADMIN — DEXTROSE MONOHYDRATE 50 ML: 500 INJECTION PARENTERAL at 01:06

## 2020-01-01 RX ADMIN — CHLORHEXIDINE GLUCONATE 0.12% ORAL RINSE 15 ML: 1.2 LIQUID ORAL at 08:08

## 2020-01-01 RX ADMIN — Medication 5 MG/HR: at 18:32

## 2020-01-01 RX ADMIN — PANTOPRAZOLE SODIUM 40 MG: 40 INJECTION, POWDER, FOR SOLUTION INTRAVENOUS at 08:08

## 2020-01-01 RX ADMIN — PROPOFOL 10 MCG/KG/MIN: 10 INJECTION, EMULSION INTRAVENOUS at 01:10

## 2020-01-01 RX ADMIN — PROPOFOL 5 MCG/KG/MIN: 10 INJECTION, EMULSION INTRAVENOUS at 22:43

## 2020-01-01 RX ADMIN — Medication 125 ML/HR: at 12:32

## 2020-01-01 RX ADMIN — Medication 5 MG/HR: at 12:29

## 2020-01-01 RX ADMIN — CHLORHEXIDINE GLUCONATE 0.12% ORAL RINSE 15 ML: 1.2 LIQUID ORAL at 21:16

## 2020-01-01 RX ADMIN — DEXTROSE AND SODIUM CHLORIDE 250 ML/HR: 5; .9 INJECTION, SOLUTION INTRAVENOUS at 21:05

## 2020-01-01 RX ADMIN — METRONIDAZOLE 500 MG: 500 INJECTION, SOLUTION INTRAVENOUS at 17:10

## 2020-01-01 RX ADMIN — CEFEPIME HYDROCHLORIDE 1000 MG: 1 INJECTION, POWDER, FOR SOLUTION INTRAMUSCULAR; INTRAVENOUS at 20:00

## 2020-01-01 RX ADMIN — IPRATROPIUM BROMIDE AND ALBUTEROL SULFATE 3 ML: 2.5; .5 SOLUTION RESPIRATORY (INHALATION) at 16:22

## 2020-01-01 RX ADMIN — ISODIUM CHLORIDE 3 ML: 0.03 SOLUTION RESPIRATORY (INHALATION) at 07:48

## 2020-01-01 RX ADMIN — ACETAMINOPHEN 650 MG: 650 SUSPENSION ORAL at 10:12

## 2020-01-01 RX ADMIN — LEVALBUTEROL 1.25 MG: 1.25 SOLUTION, CONCENTRATE RESPIRATORY (INHALATION) at 07:47

## 2020-01-01 RX ADMIN — DEXTROSE MONOHYDRATE 25 ML: 500 INJECTION PARENTERAL at 02:30

## 2020-01-01 RX ADMIN — HEPARIN SODIUM 5000 UNITS: 5000 INJECTION INTRAVENOUS; SUBCUTANEOUS at 05:19

## 2020-01-01 RX ADMIN — CHLORHEXIDINE GLUCONATE 0.12% ORAL RINSE 15 ML: 1.2 LIQUID ORAL at 01:06

## 2020-01-01 RX ADMIN — SODIUM CHLORIDE 500 ML/HR: 0.9 INJECTION, SOLUTION INTRAVENOUS at 21:09

## 2020-01-01 RX ADMIN — SODIUM BICARBONATE 50 MEQ: 84 INJECTION, SOLUTION INTRAVENOUS at 21:18

## 2020-01-14 PROBLEM — N17.9 AKI (ACUTE KIDNEY INJURY) (HCC): Status: RESOLVED | Noted: 2018-06-01 | Resolved: 2020-01-01

## 2020-01-14 PROBLEM — J44.1 COPD EXACERBATION (HCC): Status: ACTIVE | Noted: 2020-01-01

## 2020-01-14 PROBLEM — N49.2 CELLULITIS OF SCROTUM: Status: RESOLVED | Noted: 2018-06-01 | Resolved: 2020-01-01

## 2020-01-14 PROBLEM — K59.00 CONSTIPATION: Status: RESOLVED | Noted: 2019-01-01 | Resolved: 2020-01-01

## 2020-01-14 NOTE — PROGRESS NOTES
Assessment/Plan:    COPD exacerbation ([facility] )  Reported history of asthma  Given smoking history, complaint of productive cough with sputum for past 2 weeks I feel this is a COPD exacerbation    - DuoNeb given in office, symptoms improved shortly after   - short burst steroids, azithromycin and a short trial of Dulera given   - will order PFTs on follow-up visit once at baseline   - will likely initiate LAMA in near future           Diagnoses and all orders for this visit:    COPD exacerbation ([facility] )  -     ipratropium-albuterol (DUO-NEB) 0 5-2 5 mg/3 mL inhalation solution 3 mL  -     azithromycin (ZITHROMAX) 250 mg tablet; Take 2 tablets today then 1 tablet daily x 4 days  -     predniSONE 20 mg tablet; Take 2 tablets (40 mg total) by mouth daily for 5 days  -     albuterol (PROVENTIL HFA,VENTOLIN HFA) 90 mcg/act inhaler; Inhale 2 puffs every 6 (six) hours as needed for wheezing or shortness of breath  -     mometasone-formoterol (DULERA) 200-5 MCG/ACT inhaler; Inhale 2 puffs 2 (two) times a day Rinse mouth after use  Other orders  -     Cancel: Ambulatory referral to Gastroenterology; Future  -     Cancel: influenza vaccine, [id number], quadrivalent, recombinant, PF, 0 5 mL, for patients 18 yr+ (FLUBLOK)  -     Mini neb          Subjective:      Patient ID: Jade Patel is a 64 y o  male  HPI    68-year-old male presents to clinic for sick visit, complaining of increased short of breath and increased mucus production for the past 7 days, has been using his rescue inhaler 4-6 times daily and has been without medications for the past 2 days  Denies any sick contacts or recent travel he is a current smoker uninterested in cessation at this time      The following portions of the patient's history were reviewed and updated as appropriate: allergies, current medications, past family history, past medical history, past social history, past surgical history and problem list     Review of Systems   Constitutional: Positive for fatigue  Negative for activity change, appetite change, chills, diaphoresis and fever  HENT: Negative for congestion, ear pain, hearing loss, postnasal drip, rhinorrhea, sneezing, sore throat and tinnitus  Eyes: Negative for pain  Respiratory: Positive for cough, shortness of breath and wheezing  Negative for apnea, choking and chest tightness  Cardiovascular: Negative for chest pain, palpitations and leg swelling  Gastrointestinal: Negative for abdominal distention, abdominal pain, constipation, diarrhea, nausea and vomiting  Genitourinary: Negative for decreased urine volume and dysuria  Musculoskeletal: Negative for back pain  Skin: Negative for rash  Neurological: Negative for dizziness, tremors and syncope  Psychiatric/Behavioral: Negative for agitation and suicidal ideas  Objective:      /62 (BP Location: Right arm, Patient Position: Sitting, Cuff Size: Large)   Pulse 96   Temp (!) 97 4 °F (36 3 °C)   Resp 20   Wt 103 kg (226 lb)   SpO2 97%   BMI 33 37 kg/m²          Physical Exam   Constitutional: He is oriented to person, place, and time  He appears well-developed and well-nourished  No distress  Unkempt appearance   HENT:   Head: Normocephalic and atraumatic  Right Ear: External ear normal    Left Ear: External ear normal    Mouth/Throat: Oropharynx is clear and moist  No oropharyngeal exudate  Eyes: Pupils are equal, round, and reactive to light  Conjunctivae are normal  No scleral icterus  Neck: Normal range of motion  Neck supple  No tracheal deviation present  No thyromegaly present  Cardiovascular: Normal rate, regular rhythm and normal heart sounds  No murmur heard  Pulmonary/Chest: He is in respiratory distress  He has wheezes  Symptoms improved status post do not   Abdominal: Soft  Bowel sounds are normal  He exhibits no distension  There is no rebound  Musculoskeletal: Normal range of motion  He exhibits no edema  Neurological: He is alert and oriented to person, place, and time  Skin: Skin is warm  He is not diaphoretic  Psychiatric: He has a normal mood and affect  Vitals reviewed

## 2020-01-14 NOTE — ASSESSMENT & PLAN NOTE
Reported history of asthma  Given smoking history, complaint of productive cough with sputum for past 2 weeks I feel this is a COPD exacerbation    - DuoNeb given in office, symptoms improved shortly after   - short burst steroids, azithromycin and a short trial of Dulera given   - will order PFTs on follow-up visit once at baseline   - will likely initiate LAMA in near future

## 2020-01-14 NOTE — PROGRESS NOTES
Mini neb  Performed by: Cinthia Figueroa RN  Authorized by:  Alicia Perez MD     Treatment 1:   Pre-Procedure     Symptoms:  Wheezing and shortness of breath    SP02:  97    Medication Administered:  Duoneb - Albuterol 2 5 mg/Atrovent 0 5 mg

## 2020-01-21 NOTE — LETTER
Attn: Britney      This is to confirm that I am calling from [address] [place] [address]  We have an unidentified patient, with the only possible link to his identification being his [employer] Card found on a key ring  If you are able to help disclose this account information in any way we would appreciate your assistance         Thank you,       Jennifer Leonard 148  Phone: [phone]  Fax: [phone]

## 2020-01-21 NOTE — ED NOTES
Patient transported to CT on cardiac monitor and pulse ox with this RN and respiratory      Judith Donis, [facility]  01/21/20 [id number]

## 2020-01-21 NOTE — RESPIRATORY THERAPY NOTE
RT Ventilator Management Note  Omicron Omicron [employer] 82 y o  male MRN: [id number]  Unit/Bed#: ED 01 Encounter: [id number]      Daily Screen     No data found in the last 10 encounters  Physical Exam:      Received pt from EMS with CPR in progress  Pt has 8 0 oett from field  B/S equal and bilat  After ROSC, pt placed on A/C with settings per flow sheet  Dr Dionte Villar aware of Peak pressures  ABG drawn  Will cont to monitor

## 2020-01-21 NOTE — ED NOTES
When leaving CT was unable to feel pulse or hear Apical HR and BP cuff wasn't reading  Compressions started  Respiratory giving breaths with ambu bag  PEA on monitor  Pulse present when returned back to room       Mega Miller RN  01/21/20 Gina Florian

## 2020-01-21 NOTE — ED PROVIDER NOTES
History  Chief Complaint   Patient presents with    Cardiac Arrest     Pt arrived via EMS, pt was found in parking lot unresponsive pulseless, apneic, unknown downtime  Compressions initated with lucus, pt intubated by EMS, 3 rounds of epi given pre hospital       HPI    This is a elderly gentleman that was [place] a local park by a innocent by standard  EMS and police were called and patient was found to be in full cardiopulmonary arrest   Patient was intubated without difficulty IO was placed a left shoulder and patient was given multiple doses of epinephrine  Initial rhythm was asystole  Mick device was applied  Initial end-tidal CO2 was 30 as per EMS  Upon arrival patient was found to have a pulse so upon arrival patient's ROSC was confirmed  None       No past medical history on file  No past surgical history on file  No family history on file  I have reviewed and agree with the history as documented  Social History     Tobacco Use    Smoking status: Not on file   Substance Use Topics    Alcohol use: Not on file    Drug use: Not on file        Review of Systems   Unable to perform ROS: Patient unresponsive       Physical Exam  Physical Exam   Constitutional: He is intubated  HENT:   Head: Normocephalic and atraumatic  Not macrocephalic and not microcephalic  Head is without raccoon's eyes and without Nair's sign  Right Ear: Tympanic membrane and external ear normal    Left Ear: Tympanic membrane and external ear normal    Nose: No rhinorrhea, nose lacerations, sinus tenderness or nasal deformity  Mouth/Throat: Mucous membranes are pale  Eyes: Lids are normal  Lids are everted and swept, no foreign bodies found  Right pupil is not reactive  Left pupil is not reactive  Pupils were noneactive at 5 mm  Cardiovascular: Intact distal pulses  Tachycardia present  Pulmonary/Chest: Breath sounds normal  He is intubated     Respiratory effort was assisted by bag-valve ventilation  Abdominal: He exhibits distension  Neurological: He is unresponsive  GCS eye subscore is 1  GCS verbal subscore is 1  GCS motor subscore is 1  Skin: Capillary refill takes more than 3 seconds  There is pallor  Nursing note and vitals reviewed        Vital Signs  ED Triage Vitals   Temperature Pulse Respirations Blood Pressure SpO2   01/21/20 1651 01/21/20 1641 01/21/20 1645 01/21/20 1641 01/21/20 1641   (!) 94 °F (34 4 °C) 99 20 109/79 100 %      Temp Source Heart Rate Source Patient Position - Orthostatic VS BP Location FiO2 (%)   01/21/20 1651 01/21/20 1645 01/21/20 1645 01/21/20 1645 --   Rectal Monitor Lying Left arm       Pain Score       --                  Vitals:    01/21/20 1700 01/21/20 1845 01/21/20 2059 01/21/20 2200   BP: 112/66 130/73 120/61 113/62   Pulse: 72 68 66 66   Patient Position - Orthostatic VS: Lying Lying Lying          Visual Acuity  Visual Acuity      Most Recent Value   L Pupil Size (mm)  3   R Pupil Size (mm)  3          ED Medications  Medications   insulin regular (HumuLIN R,NovoLIN R) 1 Units/mL in sodium chloride 0 9 % 100 mL infusion (7 Units/hr Intravenous Rate/Dose Change 1/21/20 2115)   sodium chloride 0 9 % infusion ( Intravenous Canceled Entry 1/21/20 2112)     Followed by   sodium chloride 0 9 % infusion (500 mL/hr Intravenous New Bag 1/21/20 2109)     Followed by   sodium chloride 0 9 % infusion (has no administration in time range)   dextrose 5 % and sodium chloride 0 9 % infusion (250 mL/hr Intravenous New Bag 1/21/20 2105)   vancomycin (VANCOCIN) 2,000 mg in sodium chloride 0 9 % 500 mL IVPB (2,000 mg Intravenous New Bag 1/21/20 2128)   sodium bicarbonate 150 mEq in dextrose 5 % 900 mL infusion (100 mL/hr Intravenous New Bag 1/21/20 2155)   PHENobarbital 65 mg/mL 260 mg in sodium chloride 0 9 % 100 mL IVPB (has no administration in time range)   sodium bicarbonate 8 4 % injection 50 mEq (50 mEq Intravenous Given 1/21/20 1650)   cefepime (MAXIPIME) IVPB (premix) 2,000 mg (0 mg Intravenous Stopped 1/21/20 2116)   fomepizole (ANTIZOL) 2,000 mg in sodium chloride 0 9 % 100 mL IVPB (2,000 mg Intravenous New Bag 1/21/20 2125)   sodium bicarbonate 8 4 % injection 50 mEq (50 mEq Intravenous Given 1/21/20 2118)   LORazepam (ATIVAN) 2 mg/mL injection 2 mg (2 mg Intravenous Given 1/21/20 2215)       Diagnostic Studies  Results Reviewed     Procedure Component Value Units Date/Time    Ethylene glycol [[id number]]     Lab Status:  No result Specimen:  Blood     Methanol [[id number]]     Lab Status:  No result Specimen:  Blood     Rapid drug screen, urine [[id number]]  (Normal) Collected:  01/21/20 1929    Lab Status:  Final result Specimen:  Urine, Catheter Updated:  01/21/20 2146     Amph/Meth UR Negative     Barbiturate Ur Negative     Benzodiazepine Urine Negative     Cocaine Urine Negative     Methadone Urine Negative     Opiate Urine Negative     PCP Ur Negative     THC Urine Negative    Narrative:       FOR MEDICAL PURPOSES ONLY  IF CONFIRMATION NEEDED PLEASE CONTACT THE LAB WITHIN 5 DAYS  Drug Screen Cutoff Levels:  AMPHETAMINE/METHAMPHETAMINES  1000 ng/mL  BARBITURATES     200 ng/mL  BENZODIAZEPINES     200 ng/mL  COCAINE      300 ng/mL  METHADONE      300 ng/mL  OPIATES      300 ng/mL  PHENCYCLIDINE     25 ng/mL  THC       50 ng/mL      Osmolality-"If this is regarding a toxic alcohol, please STOP and consult medical  for further guidance " [[id number]] Collected:  01/21/20 1928    Lab Status: In process Specimen:  Blood from Arm, Left Updated:  01/21/20 2145    TSH [[id number]] Collected:  01/21/20 1648    Lab Status:   In process Specimen:  Blood from Arm, Right Updated:  01/21/20 2143    Fingerstick Glucose (POCT) [[id number]]  (Abnormal) Collected:  01/21/20 2104    Lab Status:  Final result Updated:  01/21/20 2105     POC Glucose 192 mg/dl     Fingerstick Glucose (POCT) [[id number]]  (Abnormal) Collected:  01/21/20 1953    Lab Status:  Final result Updated:  01/21/20 2101     POC Glucose 297 mg/dl     Blood gas, arterial [[id number]]  (Abnormal) Collected:  01/21/20 2012    Lab Status:  Final result Specimen:  Blood, Arterial from Radial, Right Updated:  01/21/20 2023     pH, Arterial 7 040     pCO2, Arterial 59 0 mm Hg      pO2, Arterial 293 0 mm Hg      HCO3, Arterial 16 0 mmol/L      Base Excess, Arterial -13 8 mmol/L      O2 Content, Arterial 10 9 mL/dL      O2 HGB,Arterial  95 9 %      SOURCE Radial, Right     GORGE TEST No    Beta Hydroxybutyrate [[id number]]  (Normal) Collected:  01/21/20 1928    Lab Status:  Final result Specimen:  Blood from Arm, Left Updated:  01/21/20 2015     BETA-HYDROXYBUTYRATE 0 1 mmol/L     Lactic acid, plasma x2 [[id number]]  (Normal) Collected:  01/21/20 1928    Lab Status:  Final result Specimen:  Blood from Arm, Left Updated:  01/21/20 1949     LACTIC ACID 1 6 mmol/L     Narrative:       Result may be elevated if tourniquet was used during collection      Blood gas, arterial [[id number]] Updated:  01/21/20 1933    Lab Status:  No result Specimen:  Blood, Arterial from Radial, Left     Fingerstick Glucose (POCT) [[id number]]  (Abnormal) Collected:  01/21/20 1851    Lab Status:  Final result Updated:  01/21/20 1853     POC Glucose 339 mg/dl     NT-BNP PRO [[id number]]  (Abnormal) Collected:  01/21/20 1648    Lab Status:  Final result Specimen:  Blood from Arm, Right Updated:  01/21/20 1745     NT-proBNP 12,200 pg/mL     Troponin I [[id number]]  (Abnormal) Collected:  01/21/20 1648    Lab Status:  Final result Specimen:  Blood from Arm, Right Updated:  01/21/20 1718     Troponin I 0 06 ng/mL     Comprehensive metabolic panel [[id number]]  (Abnormal) Collected:  01/21/20 1648    Lab Status:  Final result Specimen:  Blood from Arm, Right Updated:  01/21/20 1716     Sodium 139 mmol/L      Potassium 6 6 mmol/L      Chloride 113 mmol/L      CO2 12 mmol/L      ANION GAP 14 mmol/L      BUN 81 mg/dL      Creatinine 3 04 mg/dL      Glucose 364 mg/dL      Calcium 7 9 mg/dL      AST 22 U/L      ALT 27 U/L      Alkaline Phosphatase 126 U/L      Total Protein 6 5 g/dL      Albumin 3 6 g/dL      Total Bilirubin 0 30 mg/dL      eGFR 11 ml/min/1 73sq m     Narrative:       [place] guidelines for Chronic Kidney Disease (CKD):     Stage 1 with normal or high GFR (GFR > 90 mL/min/1 73 square meters)    Stage 2 Mild CKD (GFR = 60-89 mL/min/1 73 square meters)    Stage 3A Moderate CKD (GFR = 45-59 mL/min/1 73 square meters)    Stage 3B Moderate CKD (GFR = 30-44 mL/min/1 73 square meters)    Stage 4 Severe CKD (GFR = 15-29 mL/min/1 73 square meters)    Stage 5 End Stage CKD (GFR <15 mL/min/1 73 square meters)  Note: GFR calculation is accurate only with a steady state creatinine    Acetaminophen level-"If concentration is detectable, please discuss with medical  on call " [[id number]]  (Abnormal) Collected:  01/21/20 1648    Lab Status:  Final result Specimen:  Blood from Arm, Right Updated:  01/21/20 1714     Acetaminophen Level <20 ug/mL     Salicylate level [[id number]]  (Abnormal) Collected:  01/21/20 1648    Lab Status:  Final result Specimen:  Blood from Arm, Right Updated:  64/21/87 5517     Salicylate Lvl <3 2 mg/dL     Ethanol [[id number]]  (Normal) Collected:  01/21/20 1648    Lab Status:  Final result Specimen:  Blood from Arm, Right Updated:  01/21/20 1714     Ethanol Lvl <10 mg/dL     Lactic acid, plasma x2 [[id number]]     Lab Status:  No result Specimen:  Blood     CBC and differential [[id number]]  (Abnormal) Collected:  01/21/20 1648    Lab Status:  Final result Specimen:  Blood from Arm, Right Updated:  01/21/20 1702     WBC 6 30 Thousand/uL      RBC 2 44 Million/uL      Hemoglobin 7 6 g/dL      Hematocrit 26 4 %       fL      MCH 31 3 pg      MCHC 29 0 g/dL      RDW 17 5 %      MPV 9 7 fL      Platelets 476 Thousands/uL                  CT head without contrast   Final Result by Bill Reyna MD (01/21 1925)         No early signs of hypoxic ischemic injury secondary to cardiac arrest   Close follow-up imaging recommended  Workstation performed: [id number]         XR chest 1 view portable    (Results Pending)              Procedures  ECG 12 Lead Documentation Only  Date/Time: 1/21/2020 4:45 PM  Performed by: Tania Watson III, DO  Authorized by: Tania Watson III, DO     Indications / Diagnosis:  S/p cardiac arrest  Comments:      I personally reviewed this EKG was performed the patient January 21, 2020  EKG was completed at 4:57 p m  And interpreted by me at 4:55 p m   Normal sinus rhythm with fusion complexes ventricular rate of 73 beats per minute  P r n  Oval of 150 milliseconds  The QRS duration of 90 milliseconds  Please note there is T-wave flattening in anterior lateral leads  And low voltage  ECG 12 Lead Documentation Only  Date/Time: 1/21/2020 6:26 PM  Performed by: Toney Hatchet, DO  Authorized by: Tania Watson III, DO     Indications / Diagnosis:  Post cardiac arrest  ECG reviewed by me, the ED Provider: yes    Patient location:  ED  Comments:      I personally reviewed EKG that was performed the patient January 21, 2020  EKG was completed at 6:25 p m  And interpreted by me at 6:26 p m  Normal sinus rhythm with a ventricular rate of 70 beats per minute and a p r n  Oval that is prolonged at 232 milliseconds  QRS duration 96 milliseconds  The QT intervals are 442 milliseconds respectively with low-voltage anterior lateral leads  Unchanged from prior tracings    CriticalCare Time  Performed by: Toney Hatchet, DO  Authorized by: Tania Watson III, DO     Critical care provider statement:     Critical care time (minutes):  75    Critical care start time:  1/21/2020 5:00 PM    Critical care end time:  1/21/2020 6:31 PM    Critical care time was exclusive of:  Separately billable procedures and treating other patients    Critical care was necessary to treat or prevent imminent or life-threatening deterioration of the following conditions:  Circulatory failure, cardiac failure, CNS failure or compromise, dehydration, metabolic crisis, respiratory failure and renal failure  General Procedure  Date/Time: 1/21/2020 8:12 PM  Performed by: Leeann Winters III, DO  Authorized by: Leeann Winters III, DO     Comments:      Via direct laryngoscopy I placed an OG tube on the 2nd attempt  ED Course  ED Course as of Jan 21 2215   Tue Jan 21, 2020   1643 History and physical completed  Orders placed  This is a elderly individual that is homeless was found by by standard and full cardiopulmonary arrest asystole multiple rounds of epinephrine were given end-tidal CO2 was 30 patient was intubated without difficulty by EMS  Once the patient arrived here patient did have bounding radial pulses bedside ultrasound performed by me noted that there is cardiac motion without any significant effusion  1701 Reviewed ABG results with respiratory therapist at the bedside  Patient has a pH less than 6 80 with a pCO2 of 66 with a PO2 of 208 with a bicarb that was undertaken oval   Patient is currently has a tidal volume of 500 with a rate of 16 and FiO2 is 100% with very high peak pressures a 50 given the patient's ABG will proceed with increasing the patient's rate to 20        1806 Patient arrived back from CT scan in full cardiopulmonary arrest      1806 After 2 cycles of CPR supervised by me and performed by me in the staff we have return of spontaneous circulation with a blood pressure 659 systolic with a heart rate of 110 beats per minute  Given the fact the patient went to respiratory arrest in light of profound acidosis in additional amp of bicarb was given  Will repeat EKG  4361 Case discussed with Tiffani Sen PA-C, critical care PA on-call for the hospitalist service    Given the fact the patient has been resuscitated multiple times and is a cardiac arrest he feels that the ICU attending should be contacted to see the patient is a candidate for cooling  2007 Reassessed the patient after receiving the CT scan results  Patient's temperature is 93° rectal   Trace heme-positive stool  Heart rate of 68 O2 saturation 100% blood pressure is 100 9-61 and was recycled during my evaluation and 130/80  Patient remains on assist control the people 5 tidal volume of 500 with a rate of 20  ABG drawn  I place the OG tube via direct laryngoscopy  2042 Call placed to PACS, noted the pH of 7 0540 with a bicarb of 16 will increase the rate to 22 from 20 on the ventilator  2056 Case discussed at length with Dr Zenobia Feldman, reviewed history and physical   Was concerned the patient has profound acidosis unknown with the patient's alcohol is concern for toxic alcohols advised to start a 1st dose of omeprazole, send off a serum osmoles and to start vancomycin and cefepime  Patient be transferred to [facility]  2156 Case d/w Dr Noe Orozco,  on-call who performed a bedside evaluation  Given the patient's profound metabolic acidosis with normal lactic levels and the patient is now having some fasciculation is at the bedside there is a clinical concern the patient may have a underlying baseline AKA leading to this presentation  Methanol and ethylene glycol orders were placed  Advised to start phenobarb and Ativan for possible alcohol withdrawal   Formal toxicological consultation placed  MDM  Number of Diagnoses or Management Options  Acute renal failure (ARF) ([facility] [address] ):   Cardiac arrest ([facility] [address] ):   DKA (diabetic ketoacidoses) ([facility] [address] ):   Hypothermia:   Metabolic acidosis:   Respiratory failure [facility]):   Diagnosis management comments:  This is a unfortunate elderly male no identification on him who is known to be homeless by local law enforcement presents in full cardiopulmonary arrest   There was unknown down time but patient was found unresponsive AD did not advise shock EMS resuscitated the patient's but the patient [facility] intubated and gave 3 rounds epinephrine  When the patient arrived the emergency department patient did have ROSC AT   Reynaldo Khan 112 4:41 P M  Baseline labs were ordered and noted the patient was profoundly acidotic the 1st ABG noted that patient had a pH less than 6 80 and the pCO2 was incalculable  Unknown where the patient is a an alcoholic  After the patient returned from CT scan patient went back into cardiac arrest patient was resuscitated and had ROCS for 2nd time  Initial differential diagnosis was pointing to the following:  Gap metabolic acidosis is specifically MUDPILES  Due to the profound acidosis patient was started on a bicarb drip  CT of the head did not show any evidence of cerebral edema on the CT scan but I evaluated the CT scan myself and realize that the patient imaging may have showed loss of effacement between the gyri concerning for mild cerebral edema  Patient has not been making any purposeful movements during the ER visit with the exception of opening his eyes twice did have an intact gag reflex when I replaced the OG tube via direct laryngoscopy  Case discussed at length with the ICU attending Dr Alan Mora and toxicology attending Dr Lita Romero           Amount and/or Complexity of Data Reviewed  Clinical lab tests: ordered and reviewed  Tests in the radiology section of CPT®: ordered and reviewed  Tests in the medicine section of CPT®: ordered and reviewed    Risk of Complications, Morbidity, and/or Mortality  Presenting problems: high  Diagnostic procedures: moderate  Management options: high          Disposition  Final diagnoses:   Cardiac arrest ([address] )   Metabolic acidosis   Respiratory failure ([address] )   DKA (diabetic ketoacidoses) ([facility] [address] )   Acute renal failure (ARF) ([facility] [address] )   Hypothermia     Time reflects when diagnosis was documented in both MDM as applicable and the Disposition within this note     Time User Action Codes Description Comment    1/21/2020  8:57 PM Fraga Stare Add [I46 9] Cardiac arrest ([facility] [address] )     1/21/2020  8:58 PM Fraga Stare Add [M78 9] Metabolic acidosis     7/51/7610  8:58 PM Fraga Stare Add [J96 90] Respiratory failure ([facility] [address] )     1/21/2020  8:58 PM Fraga Stare Add [E11 10] DKA (diabetic ketoacidoses) ([facility] [address] )     1/21/2020  8:58 PM Fraga Stare Add [N17 9] Acute renal failure (ARF) ([facility] [address] )     1/21/2020  9:01 PM Susy Oviedo, [address]  XXXA] Hypothermia       ED Disposition     ED Disposition Condition Date/Time Comment    Transfer to Another Facility-In Network  Tue Jan 21, 2020  8:57 PM [facility] [place] [facility] should be transferred out to [facility]  Follow-up Information    None         Patient's Medications    No medications on file     No discharge procedures on file      ED Provider  Electronically Signed by           Tania Watson III, DO  01/21/20 3873

## 2020-01-22 PROBLEM — G93.40 ENCEPHALOPATHY ACUTE: Status: ACTIVE | Noted: 2020-01-01

## 2020-01-22 PROBLEM — R79.89 AZOTEMIA: Status: ACTIVE | Noted: 2020-01-01

## 2020-01-22 PROBLEM — R73.9 HYPERGLYCEMIA: Status: ACTIVE | Noted: 2020-01-01

## 2020-01-22 NOTE — H&P
H&P- Omicron Omicron Four [place] And Twenty-Two 1/21/1868, 82 y o  male MRN: [id number]    Unit/Bed#: ICU 12 Encounter: [id number]    Primary Care Provider: No primary care provider on file  Date and time admitted to hospital: 1/21/2020 11:30 PM        * Cardiac arrest [facility])  Assessment & Plan  · The patient arrived as a transfer from [address] [place] [address] after he was found down in a park and pulseless  Given his need for critical care management, he is admitted to the ICU  This will require greater then a 2 midnight stay, therefore the patient will be placed in inpatient status  · We will trend his trop for now  · We will obtain a ECHO to evaluate his overall heart function  · He had an initial temp of 92 and was asystolic   We will defer TTM for now    Acute respiratory failure ([facility] [address] )  Assessment & Plan  · Will continue vent management for now with a goal to maintain his oxygen saturations greater then 90%  · His CXR looks like he may have a developing right sided infiltrate- possible aspiration- will place him on cefepime and flagyl  · Will check an ABG and adjust his vent settings accordingly    Acute renal failure (HCC)  Assessment & Plan  · Hi baseline renal function is unknown  · We will monitor his renal indices and urine output closely  · Given that he was found down this raises the possibility of rhabdo as a contributing factor  · Will aim for a urine output > 999 cc/hr    Metabolic acidosis  Assessment & Plan  · This is likely multifactorial related to his cardiac arrest, renal failure  · Will continue the bicarb infusion for now    Hyperglycemia  Assessment & Plan  · It is unknown whether the patient has diabetes but his blood sugar was elevated on admission  · Will place him on a continuous insulin infusion for now to maintain his blood sugar between 140 and 180    Hyperkalemia  Assessment & Plan  · Possibly from rhabdo and or his renal failure  · Will recheck his level and treat accordingly  · May need dialysis if we are unable to correct with medical therapy    Hypocalcemia  Assessment & Plan  · Will recheck his level and aim to keep his ionized calcium > 1    Hypothermia  Assessment & Plan  · Will allow permissive hypothermia given his cardiac arrest    Encephalopathy acute  Assessment & Plan  · Possibly anoxic given unknown downtime  · There was also some evidence of myoclonus with spontaneous eye opening and muscle twitching   · Will place on keppra for now  · Will obtain an EEG in the AM    Anemia  Assessment & Plan  · The patient was found to have an initial hgb of 7 6, this dropped to 6 5 in the setting of fluid administration  · Will will transfuse now to keep his hgb > 7  · We will monitor for bleeding  · Baseline hgb is unknown    -------------------------------------------------------------------------------------------------------------  Chief Complaint: cardiac arrest    History of Present Illness   HX and PE limited by: patient unresponsive  Omicron Omicron Four [place] And Twenty-Two is a 82 y o  male who presents as a transfer from [facility] after he was found in a park down by bystanders  EMS was called and the patient was found to be asystolic and in cardiac arrest   CPR was initiated and the patient received several rounds of ACLS medications  On his arrival to the emergency department and at [facility] he was found to have return of spontaneous circulation  The patient was noted to have an initial temperature of 92°  Additionally he was found to have hyperkalemia and evidence of renal failure  In addition the patient was found to be hyperglycemic  No history is known of the patient prior to this admission  All information is obtained from review of the patient's medical records       History obtained from chart review  -------------------------------------------------------------------------------------------------------------  Dispo:  Admit to Critical Care Code Status: Level 1 - Full Code  --------------------------------------------------------------------------------------------------------------  Review of Systems    Review of systems was unable to be performed secondary to Patient unresponsive    Physical Exam   Constitutional: He appears well-developed and well-nourished  HENT:   Head: Normocephalic and atraumatic  Eyes: Pupils are equal, round, and reactive to light  Neck: Neck supple  Cardiovascular: Normal rate and regular rhythm  Pulmonary/Chest: He has rales  Abdominal: Soft  Bowel sounds are normal  He exhibits distension  There is no tenderness  Abdominal wall edema   Musculoskeletal: He exhibits edema  Lymphadenopathy:     He has no cervical adenopathy  Neurological:   Posturing to painful stimuli  Some evidence of possible myoclonus   Skin: Skin is warm and dry      --------------------------------------------------------------------------------------------------------------  Vitals:   Vitals:    01/21/20 2343   SpO2: 100%     Temp  Min: 92 3 °F (33 5 °C)  Max: 94 °F (34 4 °C)        There is no height or weight on file to calculate BMI      Laboratory and Diagnostics:  Results from last 7 days   Lab Units 01/21/20  2355 01/21/20  1648   WBC Thousand/uL 6 99 6 30   HEMOGLOBIN g/dL 6 5* 7 6*   HEMATOCRIT % 22 2* 26 4*   PLATELETS Thousands/uL 190 223   NEUTROS PCT % 83*  --    MONOS PCT % 12  --    MONO PCT %  --  11*     Results from last 7 days   Lab Units 01/21/20  1648   SODIUM mmol/L 139   POTASSIUM mmol/L 6 6*   CHLORIDE mmol/L 113*   CO2 mmol/L 12*   ANION GAP mmol/L 14   BUN mg/dL 81*   CREATININE mg/dL 3 04*   CALCIUM mg/dL 7 9*   GLUCOSE RANDOM mg/dL 364*   ALT U/L 27   AST U/L 22   ALK PHOS U/L 126*   ALBUMIN g/dL 3 6   TOTAL BILIRUBIN mg/dL 0 30          Results from last 7 days   Lab Units 01/21/20  2355   INR  1 20*      Results from last 7 days   Lab Units 01/21/20  2355 01/21/20  1648   TROPONIN I ng/mL 0 06* 0 06* Results from last 7 days   Lab Units 01/21/20  2355 01/21/20  1928   LACTIC ACID mmol/L 1 3 1 6     ABG:  Results from last 7 days   Lab Units 01/21/20 2012   PH ART  7 040*   PCO2 ART mm Hg 59 0*   PO2 ART mm Hg 293 0*   HCO3 ART mmol/L 16 0*   BASE EXC ART mmol/L -13 8*   ABG SOURCE  Radial, Right     VBG:  Results from last 7 days   Lab Units 01/21/20 2012   ABG SOURCE  Radial, Right           Micro:        EKG:  Normal sinus rhythm  Imaging: I have personally reviewed pertinent reports  and I have personally reviewed pertinent films in PACS      Historical Information   History reviewed  No pertinent past medical history  History reviewed  No pertinent surgical history  Social History   Social History     Substance and Sexual Activity   Alcohol Use Not on file     Social History     Substance and Sexual Activity   Drug Use Not on file     Social History     Tobacco Use   Smoking Status Not on file     Exercise History:   Family History:   History reviewed  No pertinent family history    Family history unknown and I have reviewed this patient's family history and commented on sigificant items within the HPI      Medications:  Current Facility-Administered Medications   Medication Dose Route Frequency    cefepime (MAXIPIME) 1 g/50 mL dextrose IVPB  1,000 mg Intravenous Q12H    chlorhexidine (PERIDEX) 0 12 % oral rinse 15 mL  15 mL Swish & Spit Q12H [address]    heparin (porcine) subcutaneous injection 5,000 Units  5,000 Units Subcutaneous Q8H [address]    levETIRAcetam (KEPPRA) 500 mg in sodium chloride 0 9 % 100 mL IVPB  500 mg Intravenous Q12H ERIN    metroNIDAZOLE (FLAGYL) IVPB (premix) 500 mg  500 mg Intravenous Q8H     Home medications:  None     Allergies:  Not on File    ------------------------------------------------------------------------------------------------------------  Advance Directive and Living Will:      Power of :    POLST: ------------------------------------------------------------------------------------------------------------  Anticipated Length of Stay is > 2 midnights    Counseling / Coordination of Care  Total Critical Care time spent Forty minutes excluding procedures, teaching and family updates  JOURDAN Walls        Portions of the record may have been created with voice recognition software  Occasional wrong word or "sound a like" substitutions may have occurred due to the inherent limitations of voice recognition software    Read the chart carefully and recognize, using context, where substitutions have occurred

## 2020-01-22 NOTE — ASSESSMENT & PLAN NOTE
· The patient was found to have an initial hgb of 7 6, this dropped to 6 5 in the setting of fluid administration  · Will will transfuse now to keep his hgb > 7  · We will monitor for bleeding  · Baseline hgb is unknown

## 2020-01-22 NOTE — PLAN OF CARE
Problem: Prexisting or High Potential for Compromised Skin Integrity  Goal: Skin integrity is maintained or improved  Description  INTERVENTIONS:  - Identify patients at risk for skin breakdown  - Assess and monitor skin integrity  - Assess and monitor nutrition and hydration status  - Monitor labs   - Assess for incontinence   - Turn and reposition patient  - Assist with mobility/ambulation  - Relieve pressure over bony prominences  - Avoid friction and shearing  - Provide appropriate hygiene as needed including keeping skin clean and dry  - Evaluate need for skin moisturizer/barrier cream  - Collaborate with interdisciplinary team   - Patient/family teaching  - Consider wound care consult   Outcome: Progressing     Problem: PAIN - ADULT  Goal: Verbalizes/displays adequate comfort level or baseline comfort level  Description  Interventions:  - Encourage patient to monitor pain and request assistance  - Assess pain using appropriate pain scale  - Administer analgesics based on type and severity of pain and evaluate response  - Implement non-pharmacological measures as appropriate and evaluate response  - Consider cultural and social influences on pain and pain management  - Notify physician/advanced practitioner if interventions unsuccessful or patient reports new pain  Outcome: Progressing     Problem: INFECTION - ADULT  Goal: Absence or prevention of progression during hospitalization  Description  INTERVENTIONS:  - Assess and monitor for signs and symptoms of infection  - Monitor lab/diagnostic results  - Monitor all insertion sites, i e  indwelling lines, tubes, and drains  - Monitor endotracheal if appropriate and nasal secretions for changes in amount and color  - [place] appropriate cooling/warming therapies per order  - Administer medications as ordered  - Instruct and encourage patient and family to use good hand hygiene technique  - Identify and instruct in appropriate isolation precautions for identified infection/condition  Outcome: Progressing  Goal: Absence of fever/infection during neutropenic period  Description  INTERVENTIONS:  - Monitor WBC    Outcome: Progressing     Problem: SAFETY ADULT  Goal: Patient will remain free of falls  Description  INTERVENTIONS:  - Assess patient frequently for physical needs  -  Identify cognitive and physical deficits and behaviors that affect risk of falls    -  [place] fall precautions as indicated by assessment   - Educate patient/family on patient safety including physical limitations  - Instruct patient to call for assistance with activity based on assessment  - Modify environment to reduce risk of injury  - Consider OT/PT consult to assist with strengthening/mobility  Outcome: Progressing  Goal: Maintain or return to baseline ADL function  Description  INTERVENTIONS:  -  Assess patient's ability to carry out ADLs; assess patient's baseline for ADL function and identify physical deficits which impact ability to perform ADLs (bathing, care of mouth/teeth, toileting, grooming, dressing, etc )  - Assess/evaluate cause of self-care deficits   - Assess range of motion  - Assess patient's mobility; develop plan if impaired  - Assess patient's need for assistive devices and provide as appropriate  - Encourage maximum independence but intervene and supervise when necessary  - Involve family in performance of ADLs  - Assess for home care needs following discharge   - Consider OT consult to assist with ADL evaluation and planning for discharge  - Provide patient education as appropriate  Outcome: Progressing  Goal: Maintain or return mobility status to optimal level  Description  INTERVENTIONS:  - Assess patient's baseline mobility status (ambulation, transfers, stairs, etc )    - Identify cognitive and physical deficits and behaviors that affect mobility  - Identify mobility aids required to assist with transfers and/or ambulation (gait belt, sit-to-stand, lift, walker, cane, etc )  - [place] fall precautions as indicated by assessment  - Record patient progress and toleration of activity level on Mobility SBAR; progress patient to next Phase/Stage  - Instruct patient to call for assistance with activity based on assessment  - Consider rehabilitation consult to assist with strengthening/weightbearing, etc   Outcome: Progressing     Problem: DISCHARGE PLANNING  Goal: Discharge to home or other facility with appropriate resources  Description  INTERVENTIONS:  - Identify barriers to discharge w/patient and caregiver  - Arrange for needed discharge resources and transportation as appropriate  - Identify discharge learning needs (meds, wound care, etc )  - Arrange for interpretive services to assist at discharge as needed  - Refer to Case Management Department for coordinating discharge planning if the patient needs post-hospital services based on physician/advanced practitioner order or complex needs related to functional status, cognitive ability, or social support system  Outcome: Progressing     Problem: Knowledge Deficit  Goal: Patient/family/caregiver demonstrates understanding of disease process, treatment plan, medications, and discharge instructions  Description  Complete learning assessment and assess knowledge base  Interventions:  - Provide teaching at level of understanding  - Provide teaching via preferred learning methods  Outcome: Progressing     Problem: Potential for Falls  Goal: Patient will remain free of falls  Description  INTERVENTIONS:  - Assess patient frequently for physical needs  -  Identify cognitive and physical deficits and behaviors that affect risk of falls    -  [place] fall precautions as indicated by assessment   - Educate patient/family on patient safety including physical limitations  - Instruct patient to call for assistance with activity based on assessment  - Modify environment to reduce risk of injury  - Consider OT/PT consult to assist with strengthening/mobility  Outcome: Progressing

## 2020-01-22 NOTE — EMTALA/ACUTE CARE TRANSFER
[facility] EMERGENCY DEPARTMENT  [address] [facility] [address]  [phone]  Dept: [phone]      EMTALA TRANSFER CONSENT    NAME Yanick Trejo [address] [place] And Twenty-Two                                         1868                              MRN [id number]    I have been informed of my rights regarding examination, treatment, and transfer   by Dr Bethel River III, DO    Benefits:   POSSIBLE NEUROLOGY EVALUATION, CRITICAL CARE SERVICES USING POSSIBILITY OF INDUCED HYPOTHERMIA  Risks:  VEHICLE CRASH      Consent for Transfer:  I acknowledge that my medical condition has been evaluated and explained to me by the emergency department physician or other qualified medical person and/or my attending physician, who has recommended that I be transferred to the service of   Dr Nestor Morgan at  [address]  The above potential benefits of such transfer, the potential risks associated with such transfer, and the probable risks of not being transferred have been explained to me, and I fully understand them  The doctor has explained that, in my case, the benefits of transfer outweigh the risks  I agree to be transferred  I authorize the performance of emergency medical procedures and treatments upon me in both transit and upon arrival at the receiving facility  Additionally, I authorize the release of any and all medical records to the receiving facility and request they be transported with me, if possible  I understand that the safest mode of transportation during a medical emergency is an ambulance and that the Hospital advocates the use of this mode of transport  Risks of traveling to the receiving facility by car, including absence of medical control, life sustaining equipment, such as oxygen, and medical personnel has been explained to me and I fully understand them      (ANI CORRECT BOX BELOW)  [  ]  I consent to the stated transfer and to be transported by ambulance/helicopter  [  ]  I consent to the stated transfer, but refuse transportation by ambulance and accept full responsibility for my transportation by car  I understand the risks of non-ambulance transfers and I exonerate the Hospital and its staff from any deterioration in my condition that results from this refusal     X___________________________________________    DATE  20  TIME________  Signature of patient or legally responsible individual signing on patient behalf           RELATIONSHIP TO PATIENT_________________________          Provider Certification    NAME Omicron Omicron [facility] [place] [facility]                                         1868                              MRN [id number]    A medical screening exam was performed on the above named patient  Based on the examination:    Condition Necessitating Transfer The primary encounter diagnosis was Cardiac arrest ([facility] [address] )  Diagnoses of Metabolic acidosis, Respiratory failure ([address] ), DKA (diabetic ketoacidoses) ([address] ), and Acute renal failure (ARF) ([facility] [address] ) were also pertinent to this visit  Patient Condition:      Reason for Transfer:      Transfer Requirements: Facility     · Space available and qualified personnel available for treatment as acknowledged by    · Agreed to accept transfer and to provide appropriate medical treatment as acknowledged by          · Appropriate medical records of the examination and treatment of the patient are provided at the time of transfer   [facility], [address] _______  · Transfer will be performed by qualified personnel from    and appropriate transfer equipment as required, including the use of necessary and appropriate life support measures      Provider Certification: I have examined the patient and explained the following risks and benefits of being transferred/refusing transfer to the patient/family:         Based on these reasonable risks and benefits to the patient and/or the unborn child(nuno), and based upon the information available at the time of the patients examination, I certify that the medical benefits reasonably to be expected from the provision of appropriate medical treatments at another medical facility outweigh the increasing risks, if any, to the individuals medical condition, and in the case of labor to the unborn child, from effecting the transfer      X____________________________________________ DATE 01/21/20        TIME_______      ORIGINAL - SEND TO MEDICAL RECORDS   COPY - SEND WITH PATIENT DURING TRANSFER

## 2020-01-22 NOTE — UTILIZATION REVIEW
Initial Clinical Review    Transfer from [facility]  ED    Admission: Date/Time/Statement: Inpatient Admission Orders (From admission, onward)     Ordered        01/21/20 2343  Inpatient Admission  Once                   Orders Placed This Encounter   Procedures    Inpatient Admission     Standing Status:   Standing     Number of Occurrences:   1     Order Specific Question:   Admitting Physician     Answer:   Alicia Lloyd [[id number]]     Order Specific Question:   Level of Care     Answer:   Critical Care [15]     Order Specific Question:   Estimated length of stay     Answer:   More than 2 Midnights     Order Specific Question:   Certification     Answer:   I certify that inpatient services are medically necessary for this patient for a duration of greater than two midnights  See H&P and MD Progress Notes for additional information about the patient's course of treatment  Assessment/Plan:    Unknown  Age  Male  Initially  [facility]  [address] [facility] [place] being found [place]  [place] by  [employer]  EMS  Found  Patient asystolic and in cardiac  Arrest   CPR  Initiated  And  Patient received several rounds of  ACLS  Medication  On  [address]  ED, he w as found to have  Return of spontaneous circulation  Initial temp  Was   92 degrees  Labs  Revealed hyperkalemia,   Low  Hemoglobin,  hyperglycemia   And renal failure  Transferred to [facility] for  Critical care management  PMH  Unknown  CXR  Shows developing right sided infiltrate, possibly  Aspiration  Admit  IP  ICU LOC  With Cardiac  Arrest,  Acute respiratory failure, acute renal failure, metabolic acidosis, hyperglycemia, hypocalcemia, hyperkalemia, hypothermia and acute encephalopathy  And  Plan is  Monitor  Labs,  AXEL,  Vent management,   Sodium bicarb  Drip,   Watch urine output   And insulin drip        Per   toxicology  Consult:     Diagnosis  For  Presentation   Broad, likely  Multifactorial, including ethanol withdrawal, seizures with postictal phase, overdose, primary cardiac or pulmonary etiology, myxedema coma, gastrointestinal bleeding or other source of anemia, trauma, infectious process, etc    With ethanol withdrawal being included in the diagnosis as he may be associated with his tachycardia and observed fasciculations, patient should be treated with benzodiazepines, propofol or phenobarbital for sedation  Recommend  Transfusion for anemia in setting of cardiac arrest            Additional Vital Signs:   20 0745    76  19      114/52  70 mmHg  98 %       01/22/20 0730    78  10Abnormal       122/52  72 mmHg  99 %       01/22/20 0721  97 1 °F (36 2 °C)Abnormal                      01/22/20 0700    80  18      128/58  78 mmHg  100 %       01/22/20 0600    76  23Abnormal       126/58  78 mmHg  100 %       01/22/20 0500    72  12      120/54  72 mmHg  98 %       01/22/20 0400  97 2 °F (36 2 °C)Abnormal   70  10Abnormal       122/56  76 mmHg  99 %       01/22/20 0315  96 3 °F (35 7 °C)Abnormal   70  12  125/57    110/56  72 mmHg  98 %       01/22/20 0300    66  12      116/58  74 mmHg  97 %       01/22/20 0235                100 %       01/22/20 0233  96 3 °F (35 7 °C)Abnormal   66  12      100/50  64 mmHg  97 %       01/22/20 0230    66  12      106/52  66 mmHg  98 %       01/22/20 0220  96 3 °F (35 7 °C)Abnormal   69  12  115/56               01/22/20 0219  96 3 °F (35 7 °C)Abnormal   68  12      114/54  72 mmHg  97 %       01/22/20 0200    70  12      118/54  72 mmHg  98 %       01/22/20 0100    72  12      140/60  82 mmHg  99 %       01/22/20 0000    70  12  111/69  87      100 %       01/21/20 2350  91 8 °F (33 2 °C)Abnormal               100 %  Ventilator           Pertinent Labs/Diagnostic Test Results:   EKG     Normal sinus rhythm with a ventricular rate of 70 beats per minute and a p r n   Oval that is prolonged at 232 milliseconds  QRS duration 96 milliseconds  The QT intervals are 442 milliseconds respectively with low-voltage anterior lateral leads  Unchanged from prior tracings  CXR  ( 1/22)    Pulmonary vascular congestion is again demonstrated  Munira Rich may be new effusion or consolidation on the right  Ct  Head  ( 1/21)      No early signs of hypoxic ischemic injury secondary to cardiac arrest   Close follow-up imaging recommended    Results from last 7 days   Lab Units 01/22/20  0455 01/21/20  2355 01/21/20  1648   WBC Thousand/uL 6 06 6 99 6 30   HEMOGLOBIN g/dL 6 9* 6 5* 7 6*   HEMATOCRIT % 22 9* 22 2* 26 4*   PLATELETS Thousands/uL 167 190 223   NEUTROS ABS Thousands/µL 4 99 5 77  --    TOTAL NEUT ABS Thousand/uL  --   --  4 16         Results from last 7 days   Lab Units 01/22/20  0455 01/21/20  2355 01/21/20  1648   SODIUM mmol/L 141 143 139   POTASSIUM mmol/L 6 6* 6 9* 6 6*   CHLORIDE mmol/L 113* 114* 113*   CO2 mmol/L 18* 18* 12*   ANION GAP mmol/L 10 11 14   BUN mg/dL 93* 86* 81*   CREATININE mg/dL 3 07* 3 04* 3 04*   EGFR ml/min/1 73sq m 11 11 11*   CALCIUM mg/dL 7 1* 6 9* 7 9*   CALCIUM, IONIZED mmol/L 1 02* 1 04*  --    MAGNESIUM mg/dL 2 1 2 1  --      Results from last 7 days   Lab Units 01/21/20  2355 01/21/20  1648   AST U/L 43 22   ALT U/L 39 27   ALK PHOS U/L 129* 126*   TOTAL PROTEIN g/dL 6 0* 6 5   ALBUMIN g/dL 2 8* 3 6   TOTAL BILIRUBIN mg/dL 0 33 0 30     Results from last 7 days   Lab Units 01/22/20  0811 01/22/20  0615 01/22/20  0544 01/22/20  0200 01/21/20 2357 01/21/20 2218 01/21/20  2104 01/21/20  1953 01/21/20  1851   POC GLUCOSE mg/dl 78 66 47* 61* 81 134 192* 297* 339*     Results from last 7 days   Lab Units 01/22/20  0455 01/21/20  2355 01/21/20  1648   GLUCOSE RANDOM mg/dL 47* 89 364*             BETA-HYDROXYBUTYRATE   Date Value Ref Range Status   01/21/2020 0 1 <0 6 mmol/L Final      Results from last 7 days   Lab Units 01/22/20  8338 01/22/20  0451 01/21/20  2012   [address] ART  7 175* 7 117* 7 040*   PCO2 ART mm Hg 48 5* 48 7* 59 0*   PO2 ART mm Hg 98 3 202 0* 293 0*   HCO3 ART mmol/L 17 5* 15 4* 16 0*   BASE EXC ART mmol/L -10 3 -13 0 -13 8*   O2 CONTENT ART mL/dL 10 5* 10 0* 10 9*   O2 HGB, ARTERIAL % 96 7 98 7* 95 9   ABG SOURCE  Radial, Right Radial, Right Radial, Right             Results from last 7 days   Lab Units 01/22/20  0455   CK TOTAL U/L 418*   CK MB INDEX % 3 1*   CK MB ng/mL 12 9*     Results from last 7 days   Lab Units 01/22/20  0455 01/22/20  0039 01/21/20  2355 01/21/20  1648   TROPONIN I ng/mL 0 07* 0 07* 0 06* 0 06*         Results from last 7 days   Lab Units 01/21/20  2355   PROTIME seconds 15 4*   INR  1 20*     Results from last 7 days   Lab Units 01/21/20  2355 01/21/20  1648   TSH 3RD GENERATON uIU/mL 2 041 5 490*     Results from last 7 days   Lab Units 01/21/20  2355   PROCALCITONIN ng/ml 0 23     Results from last 7 days   Lab Units 01/21/20  2355 01/21/20  1928   LACTIC ACID mmol/L 1 3 1 6             Results from last 7 days   Lab Units 01/21/20  1648   NT-PRO BNP pg/mL 12,200*           Results from last 7 days   Lab Units 01/21/20  1929   AMPH/METH  Negative   BARBITURATE UR  Negative   BENZODIAZEPINE UR  Negative   COCAINE UR  Negative   METHADONE URINE  Negative   OPIATE UR  Negative   PCP UR  Negative   THC UR  Negative     Results from last 7 days   Lab Units 01/21/20  1648   ETHANOL LVL mg/dL <10   ACETAMINOPHEN LVL ug/mL <32*   SALICYLATE LVL mg/dL <0 7*                     Results from last 7 days   Lab Units 01/21/20  1648   TOTAL COUNTED  100              History reviewed  No pertinent past medical history    Present on Admission:   Cardiac arrest ([facility] )   Metabolic acidosis   Acute renal failure (HCC)   Acute respiratory failure (HCC)   Hypothermia   Hyperkalemia   Hypocalcemia   Hyperglycemia   Anemia      Admitting Diagnosis: Cardiac arrest ([facility] ) [I46 9]  Age/Sex: 82 y o  male  Admission Orders:  Scheduled Medications:    Medications:  cefepime 1,000 mg Intravenous Q12H   chlorhexidine 15 mL Swish & Spit Q12H [address]   heparin (porcine) 5,000 Units Subcutaneous Q8H [address]   levETIRAcetam 1,000 mg Intravenous Q12H ERIN   metroNIDAZOLE 500 mg Intravenous Q8H   pantoprazole 40 mg Intravenous Q24H [address]     Continuous IV Infusions:    propofol 5-50 mcg/kg/min Intravenous Titrated   sodium bicarbonate infusion 125 mL/hr Intravenous Continuous   Insulin drip     D/c   1/21  @   1844  PRN Meds:    acetaminophen 650 mg Oral Q6H PRN   LORazepam 2 mg Intravenous Q4H PRN       IP CONSULT TO CASE MANAGEMENT  IP CONSULT TO NEPHROLOGY  IP CONSULT TO NEUROLOGY     NPO  Fall precautions  Neuro checks  Q 1 hr  2 DE  Dysphagia  eval  EEG    Network Utilization Review Department  [email or web address]  ATTENTION: Please call with any questions or concerns to [phone] and carefully listen to the prompts so that you are directed to the right person  All voicemails are confidential   Shahnaz Gong all requests for admission clinical reviews, approved or denied determinations and any other requests to dedicated fax number below belonging to the campus where the patient is receiving treatment   List of dedicated fax numbers for the Facilities:  [address] DENIALS (Administrative/Medical Necessity) [phone]   [address] (Maternity/NICU/Pediatrics) [phone]   Mary Mancia [phone]   Melvi Valerio [phone]   Mir Escudero [phone]   Mary Bedolla [phone]   [address] [place] [address] [phone]   [facility]  [phone]   [address] [facility] [address]  [address] [address] [address] [facility] [phone]

## 2020-01-22 NOTE — CONSULTS
Consultation - Nephrology   Omicron Omicron [place] 82 y o  male MRN: [id number]  Unit/Bed#: ICU 12 Encounter: [id number]    ASSESSMENT and PLAN:  1  DILCIA, present on admission, in setting of cardiac arrest/probable ATN  -b/l sCr unknown  -admit sCr 3 04, now up to 3 33 without significant urine output  Patient oliguric and still hyperkalemic   -avoid IV dye/nephrotoxins/NSAIDs  -will initiate HD after HD catheter placement by ICU team  Plan for 2 hr, Qb 250ml/m, Qd 500ml/m, UF goal 1L as tolerated in light of anasarca/volume overload, 2K, 2 5 calcium, Temp 36 5C  -Goal is to maintain SBP > 120, MAP > 65 for renal perfusion  -if patient is unable to tolerate HD, would transition to CRRT for volume management as well as electrolyte management    -monitor serial labs    2  Hyperkalemia - K 6 6 on admission, without improvement over the past 24 hrs  Inadequate response to IV lasix/medical management  Recommend HD initiation  Will plan for 2K bath  May need CRRT for rebound hyperkalemia  3  Non elevated anion gap metabolic acidosis - bicarb 19, on bicarbonate gtt  No significant improvement  Lactate ok  Will correct on HD  Dialysate bicarb bath 35      4  Azotemia with possible uremia - monitor BUN on HD  Plan for Qb 250ml/min in light of hyperkalemia and need for correction  Plan for 2 hr HD session  5  Anemia - Hgb had dropped into the 6s, now 7 5 s/p 1u blood transfusion  Monitor CBC  Transfuse prn Hgb < 7    6  Elevated troponin ? D/t demand ischemia     SUMMARY OF RECOMMENDATIONS:  Potassium remains elevated  Recommend RRT initiation for hyperkalemia  No significant response to 80mg IV lasix dose  This patient unable to consent for self and he has family to obtain consent from, 2 physician consent has been obtained  This form has been signed by myself as well as the critical care attending  Consent is in the chart      HISTORY OF PRESENT ILLNESS:  Requesting Physician: Michael Han DO Meeta  Reason for Consult: YUVAL, hyperkalemia    Omicron Omicron [place] Twenty[place] is a 15 y o  year old male who was admitted to [facility] after being found down  A renal consultation is requested today for assistance in the management of YUVAL, hyperkalemia  I am unable to elicit HPI or review of systems as patient is intubated and sedated  Per record review, patient [place] a park by bystanders on initially sent to the [facility] emergency room  He then developed cardiac arrest with crossed  Was noted to be hypothermic  He also was found to have Yuval and hyperkalemia  He was transferred to [address] [address] for higher level of care  He is on a bicarbonate drip  Blood pressure acceptable  No significant UOP noted  K persistently elevated s/p cardiac arrest      PAST MEDICAL HISTORY: Unable to obtain due to obtundation  History reviewed  No pertinent past medical history  PAST SURGICAL HISTORY: Unable to obtain due to obtundation  History reviewed  No pertinent surgical history  ALLERGIES: Unable to obtain due to obtundation  Not on File     SOCIAL HISTORY: Unable to obtain due to obtundation  Social History     Substance and Sexual Activity   Alcohol Use Not on file     Social History     Substance and Sexual Activity   Drug Use Not on file     Social History     Tobacco Use   Smoking Status Not on file       FAMILY HISTORY: Unable to obtain due to obtundation  History reviewed  No pertinent family history      MEDICATIONS:    Current Facility-Administered Medications:     acetaminophen (TYLENOL) oral suspension 650 mg, 650 mg, Oral, Q6H PRN, JOURDAN Sánchez    cefepime (MAXIPIME) 1 g/50 mL dextrose IVPB, 1,000 mg, Intravenous, Q12H, JOURDAN Hutton, Last Rate: 100 mL/hr at 01/22/20 0805, 1,000 mg at 01/22/20 0805    chlorhexidine (PERIDEX) 0 12 % oral rinse 15 mL, 15 mL, Swish & Spit, Q12H [address], JOURDAN Hutton, 15 mL at 01/22/20 0732    furosemide (LASIX) injection 80 mg, 80 mg, Intravenous, Once, JOURDAN Ruelas    heparin (porcine) subcutaneous injection 5,000 Units, 5,000 Units, Subcutaneous, Q8H [address], 5,000 Units at 01/22/20 0503 **AND** [CANCELED] Platelet count, , , Once, JOURDAN Hazel    levETIRAcetam (KEPPRA) 1,000 mg in sodium chloride 0 9 % 100 mL IVPB, 1,000 mg, Intravenous, Q12H [address], JOURDAN Sánchez, Last Rate: 400 mL/hr at 01/22/20 0849, 1,000 mg at 01/22/20 0849    LORazepam (ATIVAN) 2 mg/mL injection 2 mg, 2 mg, Intravenous, Q4H PRN, JOURDAN Sánchez    metroNIDAZOLE (FLAGYL) IVPB (premix) 500 mg, 500 mg, Intravenous, Q8H, JOURDAN Hazel, Last Rate: 200 mL/hr at 01/22/20 0733, 500 mg at 01/22/20 0733    pantoprazole (PROTONIX) injection 40 mg, 40 mg, Intravenous, Q24H [address], JOURDAN [place], 40 mg at 01/22/20 0949    propofol (DIPRIVAN) 1000 mg in 100 mL infusion (premix), 5-50 mcg/kg/min, Intravenous, Titrated, JOURDAN Hazel, Last Rate: 23 9 mL/hr at 01/22/20 0838, 30 mcg/kg/min at 01/22/20 0838    sodium bicarbonate 150 mEq in dextrose 5 % 1,000 mL infusion, 125 mL/hr, Intravenous, Continuous, JOURDAN Sánchez, Last Rate: 125 mL/hr at 01/22/20 0734, 125 mL/hr at 01/22/20 0734    REVIEW OF SYSTEMS:  More than 10 systems were reviewed  No other pertinent positive findings other than those mentioned in HPI      PHYSICAL EXAM:  Current Weight: Weight - Scale: 136 kg (299 lb 6 2 oz)  First Weight: Weight - Scale: 133 kg (293 lb 10 4 oz)  Vitals:    01/22/20 0830 01/22/20 0900 01/22/20 1000 01/22/20 1015   BP:    138/62   BP Location:       Pulse: 98 78 76 77   Resp: (!) 23 22 (!) 25 20   Temp:  (!) 97 3 °F (36 3 °C)  (!) 97 2 °F (36 2 °C)   TempSrc:  Temporal     SpO2: 99% 98% 97% 97%   Weight:           Intake/Output Summary (Last 24 hours) at 1/22/2020 1116  Last data filed at 1/22/2020 0600  Gross per 24 hour   Intake 600 ml   Output 45 ml   Net 555 ml Physical Exam   Constitutional: He appears well-developed and well-nourished  No distress  obese   HENT:   Head: Normocephalic and atraumatic  Mouth/Throat: No oropharyngeal exudate  Eyes: Right eye exhibits no discharge  Left eye exhibits no discharge  No scleral icterus  Neck: Neck supple  No thyromegaly present  Cardiovascular: Normal rate, regular rhythm and normal heart sounds  Pulmonary/Chest: Effort normal and breath sounds normal  He has no wheezes  He has no rales  Abdominal: Soft  Bowel sounds are normal  He exhibits no distension  There is no tenderness  Musculoskeletal: Normal range of motion  He exhibits no edema  Neurological: He is alert  awake   Skin: Skin is warm and dry  No rash noted  He is not diaphoretic  Psychiatric: He has a normal mood and affect  His behavior is normal    Vitals reviewed        Invasive Devices:   Urethral Catheter Double-lumen (Active)   Amt returned on insertion(mL) 32 mL 1/21/2020  7:10 PM   Reasons to continue Urinary Catheter  Accurate I&O assessment in critically ill patients (48 hr  max) 1/22/2020  4:00 AM   Goal for Removal Voiding trial when ambulation improves 1/22/2020  4:00 AM   Site Assessment Clean;Skin intact 1/22/2020  4:00 AM   Collection Container Standard drainage bag 1/22/2020  4:00 AM   Securement Method Securing device (Describe) 1/22/2020  4:00 AM   Irrigant Normal saline 1/21/2020  7:10 PM   Output (mL) 10 mL 1/22/2020  6:00 AM     Lab Results:   Results from last 7 days   Lab Units 01/22/20  0455 01/21/20  2355 01/21/20  1648   WBC Thousand/uL 6 06 6 99 6 30   HEMOGLOBIN g/dL 6 9* 6 5* 7 6*   HEMATOCRIT % 22 9* 22 2* 26 4*   PLATELETS Thousands/uL 167 190 223   POTASSIUM mmol/L 6 6* 6 9* 6 6*   CHLORIDE mmol/L 113* 114* 113*   CO2 mmol/L 18* 18* 12*   BUN mg/dL 93* 86* 81*   CREATININE mg/dL 3 07* 3 04* 3 04*   CALCIUM mg/dL 7 1* 6 9* 7 9*   MAGNESIUM mg/dL 2 1 2 1  --    ALK PHOS U/L  --  129* 126*   ALT U/L  --  39 27   AST U/L  --  43 22

## 2020-01-22 NOTE — PROGRESS NOTES
HEMODIALYSIS PROCEDURE NOTE  The patient was seen and examined on hemodialysis  This is his first HD treatment  HD initiated for hyperkalemia  Time: 2 hours  Sodium: 140 Blood flow: 250ml/min   Dialyzer: F160 Potassium: 2 Dialysate flow: 500ml/min   Access: RIJ temp HD line Bicarbonate: 35 Ultrafiltration goal: 1L   Medications on HD: bicarbonate gtt     +ETT, seizure like movement with EEG leads in place over scalp, +anasarca, RRR, no murmur, distended abdomen with abdominal wall edema, +cortés

## 2020-01-22 NOTE — ASSESSMENT & PLAN NOTE
· Possibly anoxic given unknown downtime  · There was also some evidence of myoclonus with spontaneous eye opening and muscle twitching   · Will place on keppra for now  · Will obtain an EEG in the AM

## 2020-01-22 NOTE — CONSULTS
Consultation - Medical Toxicology  Omicron Omicron [place] And Twenty[place]Two 82 y o  male MRN: [id number]  Unit/Bed#: ED 01 Encounter: [id number]      Reason for Consult / Principal Problem:  Acidosis  Inpatient consult to Toxicology  Consult performed by: Sanjana Mckinley DO  Consult ordered by: Steph Peters III, DO        01/21/20      ASSESSMENT:  Patient of unknown age with metabolic acidosis and encephalopathy  1  Acute metabolic acidosis with elevated anion gap, respiratory acidosis and acidemia  2  Cardiac arrest  3  Acute renal failure  4  Hypothermia  5  Anemia   6  Elevated BNP  7  Hyperglycemia  8  Hypocalcemia  9  Hyperkalemia  10  Elevated troponin  11  Unresponsive  12  Acute respiratory failure    RECOMMENDATIONS:  Please admit to Medical ICU as already planned and continue supportive care  I was consulted primarily for concern toxic alcohols  I suspect there is an additional process occurring behind the metabolic acidosis other than toxic alcohol ingestion since vital signs and lactate are normal   However, given the compilation acute metabolic acidosis with hypocalcemia and acute renal failure, the diagnosis should be excluded  Patient was profoundly acidotic requiring supplemental sodium bicarbonate and empiric fomepizole, limiting feasibility of trending serum bicarbonate  Therefore, the only way to evaluate at this point would be to send off an ethylene glycol and methanol concentration  Please send off ethylene glycol concentration methanol concentration  These lab values may take up to 24 hours to results from health at work labs  Patient should be treated with fomepizole in the interim, with initial dose of 15 milligrams/kilogram IV over 30 minutes, followed by 10 milligrams/kilogram Q30 minutes until toxic alcohols are resulted  If patient is dialyzed , dosing should be increased to 10 milligrams/kilogram Q4 hours    Resolving acidosis while treated with omeprazole as expected and does not exclude the diagnosis  Serum osmolality gap should be low in this setting and an elevated osmolality gap does not include or exclude the diagnosis  Patient may require dialysis for his acute renal failure and fluid overload/electrolyte abnormalities  Further decision per admitting team   However, if acid-base status not markedly improving with improved renal function and adequate urinary output, dialysis would be recommended for consideration of end-organ injury from toxic alcohol ingestion  Differential diagnosis for presentation is broad and likely multifactorial, including ethanol withdrawal, seizures with postictal phase, overdose, primary cardiac or pulmonary etiology, myxedema coma, gastrointestinal bleeding or other source of anemia, trauma, infectious process, etc  With ethanol withdrawal being included in the diagnosis as he may be associated with his tachycardia and observed fasciculations, patient should be treated with benzodiazepines, propofol or phenobarbital for sedation  Please consider transfusion for anemia in the setting of cardiac arrest      Further management per primary team          For further questions, please call [place] [facility] or [facility] [address] [place] to reach the medical  on call  Please see additional teaching note below:        Hx and PE limited by:  Respiratory failure and unresponsiveness    HPI: Omicron Omicron [place] And [facility]Two is a 15 y o  year old male who presents with unresponsiveness  Patient was [place] unresponsive in [place]mic [place] a park and coded in the field but had return of spontaneous circulation upon arrival to the emergency department  He was intubated and ventilated found to have a profound metabolic acidosis  Additionally, he had acute renal injury  Acetaminophen, salicylate and ethanol concentrations were negative      Review of Systems   Unable to perform ROS: Acuity of condition       Historical Information   No past medical history on file  No past surgical history on file  Social History   Social History     Substance and Sexual Activity   Alcohol Use Not on file     Social History     Substance and Sexual Activity   Drug Use Not on file     Social History     Tobacco Use   Smoking Status Not on file     No family history on file  Prior to Admission medications    Not on File       Current Facility-Administered Medications   Medication Dose Route Frequency    dextrose 5 % and sodium chloride 0 9 % infusion  250 mL/hr Intravenous Continuous    insulin regular (HumuLIN R,NovoLIN R) 1 Units/mL in sodium chloride 0 9 % 100 mL infusion  0 1-30 Units/hr Intravenous Continuous    LORazepam (ATIVAN) 2 mg/mL injection 2 mg  2 mg Intravenous Once    PHENobarbital 65 mg/mL 260 mg in sodium chloride 0 9 % 100 mL IVPB  260 mg Intravenous Once    sodium bicarbonate 150 mEq in dextrose 5 % 900 mL infusion  100 mL/hr Intravenous Continuous    sodium chloride 0 9 % infusion  500 mL/hr Intravenous Continuous    Followed by   Viviana Doss ON 1/22/2020] sodium chloride 0 9 % infusion  250 mL/hr Intravenous Continuous    vancomycin (VANCOCIN) 2,000 mg in sodium chloride 0 9 % 500 mL IVPB  15 mg/kg Intravenous Once       Allergies not on file    Objective       Intake/Output Summary (Last 24 hours) at 1/21/2020 2156  Last data filed at 1/21/2020 2116  Gross per 24 hour   Intake 1050 ml   Output 32 ml   Net 1018 ml       Invasive Devices:   Peripheral IV 01/21/20 Right Antecubital (Active)   Site Assessment Clean;Dry; Intact 1/21/2020  4:46 PM   Dressing Type Transparent 1/21/2020  4:46 PM   Line Status Blood return noted; Flushed 1/21/2020  4:46 PM   Dressing Status Clean;Dry; Intact 1/21/2020  4:46 PM       Peripheral IV 01/21/20 Left Forearm (Active)   Site Assessment Clean;Dry; Intact 1/21/2020  7:53 PM   Dressing Type Transparent 1/21/2020  7:53 PM   Line Status Blood return noted; Flushed 1/21/2020  7:53 PM   Dressing Status Clean;Dry; Intact 1/21/2020  7:53 PM       NG/OG/Enteral Tube Orogastric Center mouth (Active)   Placement Reverification Auscultation 1/21/2020  8:15 PM   External Tube Length (cm) 70 cm 1/21/2020  8:15 PM   Status Suction-low continuous 1/21/2020  8:15 PM   Drainage Appearance Bile;Brown 1/21/2020  8:15 PM       Urethral Catheter Double-lumen (Active)   Amt returned on insertion(mL) 32 mL 1/21/2020  7:10 PM   Reasons to continue Urinary Catheter  Accurate I&O assessment in critically ill patients (48 hr  max) 1/21/2020  7:10 PM   Site Assessment Clean;Skin intact 1/21/2020  7:10 PM   Securement Method Securing device (Describe) 1/21/2020  7:10 PM   Irrigant Normal saline 1/21/2020  7:10 PM       ETT  Cuffed 8 mm (Active)   Secured at (cm) 23 1/21/2020  5:03 PM   Measured from Lips 1/21/2020  5:03 PM   Secured Location Right 1/21/2020  5:03 PM   Secured by [employer] 1/21/2020  5:03 PM   Site Condition Dry 1/21/2020  5:03 PM       Intraosseous Line 01/21/20 Femur (Active)       Vitals   Vitals:    01/21/20 1700 01/21/20 1845 01/21/20 1910 01/21/20 2059   BP: 112/66 130/73  120/61   TempSrc:   Rectal    Pulse: 72 68  66   Resp: 20 20     Patient Position - Orthostatic VS: Lying Lying  Lying   Temp:   (!) 92 3 °F (33 5 °C)        Physical Exam   Constitutional: He appears distressed  He is intubated  HENT:   Head: Normocephalic and atraumatic  Eyes: Pupils are equal, round, and reactive to light  Conjunctivae are normal    Neck: Normal range of motion  Cardiovascular: Regular rhythm  Tachycardia present  Pulmonary/Chest: He is intubated  Abdominal: Soft  He exhibits distension  Musculoskeletal: He exhibits edema (diffuse)  Neurological: He is unresponsive  He displays tremor  He exhibits normal muscle tone  Sedated  Localizing pain  Facial fasciculations  Skin: Skin is dry  There is pallor  Nursing note and vitals reviewed        EKG, Pathology, and Other Studies: I have personally reviewed pertinent reports  and EKG with normal sinus rhythm, QRS of 96 milliseconds,  milliseconds, isolated T-wave inversion in aVL  Lab Results: I have personally reviewed pertinent reports  Labs:   Results for [facility] Christian Treadwell (MRN [id number]) as of 1/21/2020 22:03   Ref   Range 1/21/2020 16:48 1/21/2020 18:51 1/21/2020 19:28 1/21/2020 19:29 1/21/2020 19:53 1/21/2020 20:12 1/21/2020 21:04   GORGE TEST Unknown      No    pH, Arterial Latest Ref Range: 7 350 - 7 450       7 040 (LL)    pCO2, Arterial Latest Ref Range: 35 0 - 45 0 mm Hg      59 0 (HH)    pO2, Arterial Latest Ref Range: 80 0 - 105 0 mm Hg      293 0 (H)    HCO3, Arterial Latest Ref Range: 22 0 - 26 0 mmol/L      16 0 (L)    Base Excess, Arterial Latest Ref Range: -2 1 - 2 1 mmol/L      -13 8 (L)    O2 Content, Arterial Latest Ref Range: 16 0 - 23 0 mL/dL      10 9 (L)    O2 HGB,Arterial Latest Ref Range: 95 0 - 98 0 %      95 9    ABG SOURCE Unknown      Radial, Right    POC Glucose Latest Ref Range: 65 - 140 mg/dl  339 (H)   297 (H)  192 (H)   Sodium Latest Ref Range: 137 - 147 mmol/L 139         Potassium Latest Ref Range: 3 6 - 5 0 mmol/L 6 6 (HH)         Chloride Latest Ref Range: 97 - 108 mmol/L 113 (H)         CO2 Latest Ref Range: 22 - 30 mmol/L 12 (L)         Anion Gap Latest Ref Range: 5 - 14 mmol/L 14         BUN Latest Ref Range: 5 - 25 mg/dL 81 (H)         Creatinine Latest Ref Range: 0 70 - 1 50 mg/dL 3 04 (H)         Glucose, Random Latest Ref Range: 70 - 99 mg/dL 364 (H)         Calcium Latest Ref Range: 8 4 - 10 2 mg/dL 7 9 (L)         AST Latest Ref Range: 17 - 59 U/L 22         ALT Latest Ref Range: 9 - 52 U/L 27         Alkaline Phosphatase Latest Ref Range: 43 - 122 U/L 126 (H)         Total Protein Latest Ref Range: 5 9 - 8 4 g/dL 6 5         Albumin Latest Ref Range: 3 0 - 5 2 g/dL 3 6         TOTAL BILIRUBIN Latest Ref Range: <1 30 mg/dL 0 30         eGFR Latest Ref Range: >60 ml/min/1 73sq m 11 (L)         Troponin I Latest Ref Range: 0 00 - 0 03 ng/mL 0 06 (H)         NT-proBNP Latest Ref Range: 0 - 299 pg/mL 12,200 (H)         LACTIC ACID Latest Ref Range: 0 7 - 2 0 mmol/L   1 6       WBC Latest Ref Range: 4 31 - 10 16 Thousand/uL 6 30         Red Blood Cell Count Latest Ref Range: 4 50 - 5 90 Million/uL 2 44 (L)         Hemoglobin Latest Ref Range: 13 5 - 17 5 g/dL 7 6 (L)         HCT Latest Ref Range: 41 0 - 53 0 % 26 4 (L)         MCV Latest Ref Range: 80 - 100 fL 108 (H)         MCH Latest Ref Range: 26 8 - 34 3 pg 31 3         MCHC Latest Ref Range: 31 4 - 37 4 g/dL 29 0 (L)         RDW Latest Ref Range: <15 3 % 17 5 (H)         Platelet Count Latest Ref Range: 150 - 450 Thousands/uL 223         MPV Latest Ref Range: 8 9 - 12 7 fL 9 7         Platelet Estimate Latest Ref Range: Adequate  Adequate         Segs Relative Latest Ref Range: 45 - 65 % 66 (H)         Abs Neutrophils Latest Ref Range: 1 80 - 7 80 Thousand/uL 4 16         Lymphocytes % Latest Ref Range: 25 - 45 % 23 (L)         Monocytes Latest Ref Range: 1 - 10 % 11 (H)         Lymphocytes Absolute Latest Ref Range: 0 50 - 4 00 Thousand/uL 1 45         Absolute Monocytes Latest Ref Range: 0 20 - 0 90 Thousand/uL 0 69         Total Counted Unknown 100         RBC Morphology Unknown abnormal         Froylan Cells Unknown Present         Anisocytosis Unknown Present         Hypochromia Unknown Present         Poikilocytes Unknown Present         ACETAMINOPHEN LEVEL Latest Ref Range: 10 - 20 ug/mL <28 (L)         SALICYLATE LEVEL Latest Ref Range: 10 - 30 mg/dL <1 0 (L)         MEDICAL ALCOHOL Latest Ref Range: 0 - 10 mg/dL <10         AMPH/METH Latest Ref Range: Negative     Negative      BARBITURATE URINE Latest Ref Range: Negative     Negative      BENZODIAZEPINE URINE Latest Ref Range: Negative     Negative      THC URINE Latest Ref Range: Negative     Negative      COCAINE URINE Latest Ref Range: Negative Negative      METHADONE URINE Latest Ref Range: Negative     Negative      OPIATE URINE Latest Ref Range: Negative     Negative      PHENCYCLIDINE URINE Latest Ref Range: Negative     Negative            Imaging Studies: I have personally reviewed pertinent reports          Minutes of critical care time 59  -Critical care time was exclusive of seperately billable procedures and teaching time    -Critical care was necessary to treat or prevent imminent or life-threatening deterioration of the following condition: cardiac failure, circulatory failure, CNS failure/comprimise, dehydration, metabolic crisis, renal failure, respiratory failure, sepsis, shock, toxidrome, trauma   -Critical care time was spent personally by me on the following activities as well as the above as per the course and rest of chart: obtaining history from patient/surrogate, developement of a treatment plan, discussions with primary provider(s), evaluation of patient's response to the treatment, examination of the patient, recommending treatements and interventions, re-evaluation of the patient's condition, review of old charts, recommending/reviewing laboratory studies, recommending/reviewing of radiographic studies

## 2020-01-22 NOTE — ASSESSMENT & PLAN NOTE
· Will continue vent management for now with a goal to maintain his oxygen saturations greater then 90%  · His CXR looks like he may have a developing right sided infiltrate- possible aspiration- will place him on cefepime and flagyl  · Will check an ABG and adjust his vent settings accordingly

## 2020-01-22 NOTE — CONSULTS
Consultation - Neurology   Omicron Omicron Four Morrison And Twenty-Two 82 y o  male MRN: [id number]  Unit/Bed#: ICU 15 Encounter: [id number]    Assessment/Plan   The patient presented to [facility] after he was found down in a park by bystanders, per record review EMS was called and patient was found to be in asystole and cardiac arrest   CPR was initiated patient received several rounds of ACLS/medications  The patient had return of spontaneous circulation, the patient was transferred to [address] [place] [address]  Neurology was asked to see in regards to his myoclonus  The routine EEG was completed and showed unreactive burst suppression pattern, indicates severe diffuse cerebral dysfunction, frequent twitching are consistent with post hypoxic myoclonus  No electrographic seizures were seen  Neurological examination as below  Cardiac Arrest - etiology unclear  Suspected anoxic brain injury  Myoclonus - no reported seizures on EEG  Possible aspiration  DILCIA  Hyperkalemia   Hypothermia    -  Continued supportive care, ICU following closely - correcting metabolic and infectious etiologies, continued acute respiratory vent care  -  Nephrology following  -  Continued serial examination, will review with attending repeat imaging of brain to look for signs of hypoxic injury   -  Reviewed EEG findings  -  Neurology will continue to follow and advise, please see attending attestation for further details       History of Present Illness     Reason for Consult / Principal Problem:  Seizure post cardiac arrest/ cardiac arrest    HPI: Omicron Omicron Four Morrison And Twenty-Two is a 89 y o male who presented to transferred from [facility] after he was found down in a park by bystanders, per record review EMS was called and patient was found to be in asystole and cardiac arrest   CPR was initiated and patient received several rounds of  ACLS / medications    On arrival to emergency department [place] he was noted to have a return of spontaneous circulation  The patient was noted to have initial temperature of 92°, he was found hypokalemic and in acute renal failure, as well as hyperglycemic  No known hx or medications prior to admission, all information was obtained from the chart  It was reported that the patient had evidence of myoclonus, with spontaneous eye movement and muscle twitching, for which patient was started on Keppra an EEG was requested and neurology was asked to evaluate  The ICU is managing him for post cardiac arrest, they requested a echo to evaluate his heart function  Acute respiratory failure, it appears that he may be developing right-sided infiltrate possible aspiration he was placed on cefepime and flagyl  He was noted to multiple metabolic derangements - he was noted to be in acute renal failure, to have hyperglycemia, hyperkalemia, hypercalcemia, hypothermia, for which the medical team is managing  He is encephalopathic, there is a concern for possible anoxic injury, given his unknown down time, as well as myoclonic jerking to rule out seizure active  Ct of head - no early sings of hypoxic ischemic injury secondary to cardiac arrest      Neurology was asked to evaluate  Inpatient consult to Neurology  Consult performed by: Keren De La O PA-C  Consult ordered by: JOURDAN Desir        Review of Systems   Unable to perform ROS: Other      Unable secondary to mental status  Mental status change    Historical Information   History reviewed  No pertinent past medical history  History reviewed  No pertinent surgical history  Social History   Social History     Substance and Sexual Activity   Alcohol Use Not on file     Social History     Substance and Sexual Activity   Drug Use Not on file     Social History     Tobacco Use   Smoking Status Not on file     Family History: History reviewed  No pertinent family history      Meds/Allergies   all current active meds have been reviewed, current meds:   Current Facility-Administered Medications   Medication Dose Route Frequency    acetaminophen (TYLENOL) oral suspension 650 mg  650 mg Oral Q6H PRN    cefepime (MAXIPIME) 1 g/50 mL dextrose IVPB  1,000 mg Intravenous Q12H    chlorhexidine (PERIDEX) 0 12 % oral rinse 15 mL  15 mL Swish & Spit Q12H [address]    heparin (porcine) subcutaneous injection 5,000 Units  5,000 Units Subcutaneous Q8H [address]    levETIRAcetam (KEPPRA) 1,000 mg in sodium chloride 0 9 % 100 mL IVPB  1,000 mg Intravenous Q12H [address]    LORazepam (ATIVAN) 2 mg/mL injection 2 mg  2 mg Intravenous Q4H PRN    metroNIDAZOLE (FLAGYL) IVPB (premix) 500 mg  500 mg Intravenous Q8H    pantoprazole (PROTONIX) injection 40 mg  40 mg Intravenous Q24H ERIN    propofol (DIPRIVAN) 1000 mg in 100 mL infusion (premix)  5-50 mcg/kg/min Intravenous Titrated    sodium bicarbonate 150 mEq in dextrose 5 % 1,000 mL infusion  125 mL/hr Intravenous Continuous    and PTA meds:   None     Not on File    Objective   Vitals:Blood pressure 138/62, pulse 77, temperature (!) 97 2 °F (36 2 °C), resp  rate 20, weight 136 kg (299 lb 6 2 oz), SpO2 97 %  ,There is no height or weight on file to calculate BMI  Intake/Output Summary (Last 24 hours) at 1/22/2020 1125  Last data filed at 1/22/2020 0600  Gross per 24 hour   Intake 600 ml   Output 45 ml   Net 555 ml     Invasive Devices:    Invasive Devices     Peripheral Intravenous Line            Peripheral IV 01/21/20 Left Forearm less than 1 day    Peripheral IV 01/21/20 Right Antecubital less than 1 day    Peripheral IV 01/22/20 Left Antecubital less than 1 day          Arterial Line            Arterial Line 01/22/20 Right Radial less than 1 day          Drain            NG/OG/Enteral Tube Orogastric Center mouth less than 1 day    Urethral Catheter Double-lumen less than 1 day          Airway            ETT  Cuffed 8 mm less than 1 day              Physical Exam   Constitutional:   Ill appearing in ICU setting, on vent, intubated   HENT:   Intubated and on vent   Eyes:   Pupils are equal non reactive - gaze midline   Neck: No thyromegaly present  Cardiovascular: Normal rate  Pulmonary/Chest:   On vent, intubated  Abdominal: He exhibits distension  Musculoskeletal:   + myoclonic jerking of upper extremities noted  Neurological:   Reflex Scores:       Tricep reflexes are 1+ on the right side and 1+ on the left side  Bicep reflexes are 1+ on the right side and 1+ on the left side  Brachioradialis reflexes are 1+ on the right side and 1+ on the left side  Patellar reflexes are 1+ on the right side and 1+ on the left side  Vitals reviewed  Neurologic Exam     Mental Status   Not following commands, not answering questions, eyes closed during entire examination, no tracking to examiner      Cranial Nerves   Eyes midline, no roving eye movements, no dysconjugate gaze noted  No blink to threat bilaterally  Pupils are equal non reactive    No corneal reflex bilaterally  No reported gag per nursing  Motor Exam No withdrawal of uppers extremities, no movement seen  ? Extensor movements in uppers    No withdrawal of lowers noted, no movement observed     Tone appears slight decreased in uppers bilaterally  Sensory Exam   No grimace to testing in uppers or lowers  Gait, Coordination, and Reflexes     Reflexes   Right brachioradialis: 1+  Left brachioradialis: 1+  Right biceps: 1+  Left biceps: 1+  Right triceps: 1+  Left triceps: 1+  Right patellar: 1+  Left patellar: 1+  Right plantar: equivocal  Left plantar: equivocal  Right ankle clonus: absent  Left ankle clonus: absent+ myoclonic jerking noted of the upper extremities during the entire examination, intermittent  Lab Results:   I have personally reviewed pertinent reports    , CBC:   Results from last 7 days   Lab Units 01/22/20  0455 01/21/20  2355 01/21/20  1648   WBC Thousand/uL 6 06 6 99 6 30   RBC Million/uL 2 19* 2 05* 2 44*   HEMOGLOBIN g/dL 6 9* 6 5* 7 6*   HEMATOCRIT % 22 9* 22 2* 26 4*   MCV fL 105* 108* 108*   PLATELETS Thousands/uL 167 190 223   , BMP/CMP:   Results from last 7 days   Lab Units 01/22/20  0455 01/21/20  2355 01/21/20  1648   SODIUM mmol/L 141 143 139   POTASSIUM mmol/L 6 6* 6 9* 6 6*   CHLORIDE mmol/L 113* 114* 113*   CO2 mmol/L 18* 18* 12*   BUN mg/dL 93* 86* 81*   CREATININE mg/dL 3 07* 3 04* 3 04*   CALCIUM mg/dL 7 1* 6 9* 7 9*   AST U/L  --  43 22   ALT U/L  --  39 27   ALK PHOS U/L  --  129* 126*   EGFR ml/min/1 73sq m 11 11 11*   , Vitamin B12:   , HgBA1C:   , TSH:   Results from last 7 days   Lab Units 01/21/20  2355 01/21/20  1648   TSH 3RD GENERATON uIU/mL 2 041 5 490*   , Coagulation:   Results from last 7 days   Lab Units 01/21/20  2355   INR  1 20*   , Lipid Profile:   , Ammonia:   , Urinalysis:       Invalid input(s): URIBILINOGEN, Drug Screen:   Results from last 7 days   Lab Units 01/21/20  1929   BARBITURATE UR  Negative   BENZODIAZEPINE UR  Negative   THC UR  Negative   COCAINE UR  Negative   METHADONE URINE  Negative   OPIATE UR  Negative   PCP UR  Negative   , Medication Drug Levels:       Invalid input(s): CARBAMAZEPINE,  PHENOBARB, LACOSAMIDE, OXCARBAZEPINE     Per radiology interpretation -    "  Procedure: Xr Chest 1 View Portable    Result Date: 1/22/2020  Narrative: CHEST INDICATION:   post intubaiton  COMPARISON:  None EXAM PERFORMED/VIEWS:  XR CHEST PORTABLE FINDINGS:  Endotracheal tube tip is 3 cm proximal to the crystal  Cardiomediastinal silhouette appears unremarkable  Moderate pulmonary vascular congestion is present  No pneumothorax or pleural effusion  Osseous structures appear within normal limits for patient age  Impression: Moderate pulmonary vascular congestion  Workstation performed: [id number]     Procedure: Ct Head Without Contrast    Result Date: 1/21/2020  Narrative: CT BRAIN - WITHOUT CONTRAST INDICATION:   Altered mental status  COMPARISON:  None  TECHNIQUE:  CT examination of the brain was performed  In addition to axial images, coronal 2D reformatted images were created and submitted for interpretation  Radiation dose length product (DLP) for this visit:  1010 mGy-cm   This examination, like all CT scans performed in the [facility], was performed utilizing techniques to minimize radiation dose exposure, including the use of iterative reconstruction and automated exposure control  IMAGE QUALITY:  Diagnostic  FINDINGS: PARENCHYMA:  Gray-white matter differentiation preserved  No evidence of acute vascular territorial infarction or intracranial hemorrhage  No sulcal effacement  Intact basal cisterns  VENTRICLES AND EXTRA-AXIAL SPACES:  No hydrocephalus or extra-axial collection  VISUALIZED ORBITS AND PARANASAL SINUSES:  Intact globes  No retrobulbar hematoma  Secretions in the posterior nasal cavity  No visualized paranasal sinus disease CALVARIUM AND EXTRACRANIAL SOFT TISSUES:  No acute calvarial fracture  Impression: No early signs of hypoxic ischemic injury secondary to cardiac arrest   Close follow-up imaging recommended  Workstation performed: [id number]     Procedure: Xr Chest Portable Icu    Result Date: 1/22/2020  Narrative: CHEST INDICATION:   hypoxia  COMPARISON:  1/21/2020 EXAM PERFORMED/VIEWS:  XR CHEST PORTABLE ICU FINDINGS: The cardiac silhouette is enlarged  Endotracheal tube is noted 3 5 cm above the crystal  Nasogastric tube is present below the diaphragm  There is asymmetric density noted on the right as compared to left which may be related to some layering effusion or new airspace consolidation  There is pulmonary vascular congestion noted which is again noted  Atelectasis versus small infiltrate is seen at the left lung base  There is no pneumothorax  Osseous structures appear within normal limits for patient age  Impression: Pulmonary vascular congestion is again demonstrated  There may be new effusion or consolidation on the right  Workstation performed: [id number]   "    EEG was completed -     "  EEG Interpretation: This Routine EEG recorded in an intubated and ventilated patient is abnormal  Unreactive burst suppression pattern indicates severe diffuse cerebral dysfunction of nonspecific etiology  Frequent twitching movements, mainly involving shoulders, were seen during bursts of activity, but without any change in the background and occurring at varying times during the burst of activity  These twitches are consistent with post-hypoxic myoclonus  No electrographic seizures were seen       If clinically warranted, a repeat EEG in 24 hours could be considered to evaluate for evolution of EEG findings   "     Imaging Studies: I have personally reviewed pertinent reports  EKG, Pathology, and Other Studies: I have personally reviewed pertinent reports        Code Status: Level 1 - Full Code

## 2020-01-22 NOTE — SOCIAL WORK
Return call from [address] [place] [address] [phone]  - pts cousin who lives in [place] - pts ex wife reached out to her - she is attempting to call family, the patient has a sister, Tony Partida, his brother, Ruby Gastelum passed away  She confirms he has two brothers, Vince Brush, who is under the age of 25 and Ashok Valentino and they will attempt to reach Ruby Gastelum  [employer] [phone] called back and left CM a voice message, she identified herself as being the pts wife and , for the past 6 years  Unsure if she and the pt are legally   CM attempted to call back and left a VM

## 2020-01-22 NOTE — ASSESSMENT & PLAN NOTE
· It is unknown whether the patient has diabetes but his blood sugar was elevated on admission  · Will place him on a continuous insulin infusion for now to maintain his blood sugar between 140 and 180

## 2020-01-22 NOTE — PLAN OF CARE
Patient having 1st  two hour Hemodialysis treatment today, Dr Rickey Solano present at start  Goal for treatment is 1 liter

## 2020-01-22 NOTE — SOCIAL WORK
Vesna Goldsmith  [address]  Male, 64 y  o , 1960  MRN: [id number]    The [place] police department was able to identify the patient as above person via [address]  Cm brought up his Medical Record - only emergency contact listed is a Tracy Juan [phone]  He is listed as legally seperated - so may still be   Last CM note is from Marty Franklin at [facility] reporting that there were concerns for homelessness  Multiple encounters for [address] [address] from 2015/2016 - CM called back Ranjith Lewis [phone] with [facility] and he reports this name is familiar to him and he will work with his team to identify further if they know him  Left a VM for Judith Lucas with [address] [facility] [address] to do the same  CM attempting to locate next of kin for Mr Ranjith Parikh

## 2020-01-22 NOTE — SOCIAL WORK
Sherri Spencer found yesterday - CM consulted to assist with identifying this patient:     Called [employer] [phone] - the officer found a welfare call for around 1500/1600, the number is #[phone], he reports it is for an unidentified male found down in a [employer] parking lot  Coordinate [address] being [address]  Google searched and found [employer] on [address] - called the [employer], they were unaware of the events, provided them with a physical description of the patient, however, it does not sound familiar to them  Called the [employer] - provided a physical description to them, and they are also not familiar with anyone with that description  They also report that they did not have any no-shows for their men who typically stay with them last night  Called Delgado Sanchez with [address] [facility] [address] to provide a description, she will reach out to [facility] (Cm also left a Vm for Karla at [phone]) and they will check together to see if they may know this patient  At the time, the only belongings that may be able to link this patient to an identity is his [employer] Card, called [employer] at [phone], discussed the situation with them and faxed over a request for account information [phone]  CM following

## 2020-01-22 NOTE — ASSESSMENT & PLAN NOTE
· Hi baseline renal function is unknown  · We will monitor his renal indices and urine output closely  · Given that he was found down this raises the possibility of rhabdo as a contributing factor  · Will aim for a urine output > 100 cc/hr

## 2020-01-22 NOTE — SOCIAL WORK
Call back from [employer] -     Card found on pt is associated with a Tania Buerger, called [employer], provided the address given by Tom, they confirm a Tania Buerger lives there but the patients physical description does not match that of the patient

## 2020-01-22 NOTE — ASSESSMENT & PLAN NOTE
· Possibly from rhabdo and or his renal failure  · Will recheck his level and treat accordingly  · May need dialysis if we are unable to correct with medical therapy

## 2020-01-22 NOTE — SOCIAL WORK
[facility] out to see the patient - they are unfamiliar with this person  Officer Santiago Perez from [address] [place] [address] out to fingerprint the patient and will let us know if they have a match  [phone]  Called [facility] to also notify them of the situation  If this patient passes away - PLEASE CALL THE CORONERS OFFICE IMMEDIATELY AND DOCUMENT THEY NEED TO BE CALLED TO ASSIST  Cm following

## 2020-01-22 NOTE — ASSESSMENT & PLAN NOTE
· This is likely multifactorial related to his cardiac arrest, renal failure  · Will continue the bicarb infusion for now

## 2020-01-22 NOTE — ASSESSMENT & PLAN NOTE
· The patient arrived as a transfer from [facility] after he was found down in a park and pulseless  Given his need for critical care management, he is admitted to the ICU  This will require greater then a 2 midnight stay, therefore the patient will be placed in inpatient status  · We will trend his trop for now  · We will obtain a ECHO to evaluate his overall heart function  · He had an initial temp of 92 and was asystolic   We will defer TTM for now

## 2020-01-22 NOTE — PLAN OF CARE
Problem: Prexisting or High Potential for Compromised Skin Integrity  Goal: Skin integrity is maintained or improved  Description  INTERVENTIONS:  - Identify patients at risk for skin breakdown  - Assess and monitor skin integrity  - Assess and monitor nutrition and hydration status  - Monitor labs   - Assess for incontinence   - Turn and reposition patient  - Assist with mobility/ambulation  - Relieve pressure over bony prominences  - Avoid friction and shearing  - Provide appropriate hygiene as needed including keeping skin clean and dry  - Evaluate need for skin moisturizer/barrier cream  - Collaborate with interdisciplinary team   - Patient/family teaching  - Consider wound care consult   Outcome: Progressing     Problem: PAIN - ADULT  Goal: Verbalizes/displays adequate comfort level or baseline comfort level  Description  Interventions:  - Encourage patient to monitor pain and request assistance  - Assess pain using appropriate pain scale  - Administer analgesics based on type and severity of pain and evaluate response  - Implement non-pharmacological measures as appropriate and evaluate response  - Consider cultural and social influences on pain and pain management  - Notify physician/advanced practitioner if interventions unsuccessful or patient reports new pain  Outcome: Progressing     Problem: INFECTION - ADULT  Goal: Absence or prevention of progression during hospitalization  Description  INTERVENTIONS:  - Assess and monitor for signs and symptoms of infection  - Monitor lab/diagnostic results  - Monitor all insertion sites, i e  indwelling lines, tubes, and drains  - Monitor endotracheal if appropriate and nasal secretions for changes in amount and color  - [place] appropriate cooling/warming therapies per order  - Administer medications as ordered  - Instruct and encourage patient and family to use good hand hygiene technique  - Identify and instruct in appropriate isolation precautions for identified infection/condition  Outcome: Progressing  Goal: Absence of fever/infection during neutropenic period  Description  INTERVENTIONS:  - Monitor WBC    Outcome: Progressing     Problem: SAFETY ADULT  Goal: Patient will remain free of falls  Description  INTERVENTIONS:  - Assess patient frequently for physical needs  -  Identify cognitive and physical deficits and behaviors that affect risk of falls    -  [place] fall precautions as indicated by assessment   - Educate patient/family on patient safety including physical limitations  - Instruct patient to call for assistance with activity based on assessment  - Modify environment to reduce risk of injury  - Consider OT/PT consult to assist with strengthening/mobility  Outcome: Progressing  Goal: Maintain or return to baseline ADL function  Description  INTERVENTIONS:  -  Assess patient's ability to carry out ADLs; assess patient's baseline for ADL function and identify physical deficits which impact ability to perform ADLs (bathing, care of mouth/teeth, toileting, grooming, dressing, etc )  - Assess/evaluate cause of self-care deficits   - Assess range of motion  - Assess patient's mobility; develop plan if impaired  - Assess patient's need for assistive devices and provide as appropriate  - Encourage maximum independence but intervene and supervise when necessary  - Involve family in performance of ADLs  - Assess for home care needs following discharge   - Consider OT consult to assist with ADL evaluation and planning for discharge  - Provide patient education as appropriate  Outcome: Progressing  Goal: Maintain or return mobility status to optimal level  Description  INTERVENTIONS:  - Assess patient's baseline mobility status (ambulation, transfers, stairs, etc )    - Identify cognitive and physical deficits and behaviors that affect mobility  - Identify mobility aids required to assist with transfers and/or ambulation (gait belt, sit-to-stand, lift, walker, cane, etc )  - [place] fall precautions as indicated by assessment  - Record patient progress and toleration of activity level on Mobility SBAR; progress patient to next Phase/Stage  - Instruct patient to call for assistance with activity based on assessment  - Consider rehabilitation consult to assist with strengthening/weightbearing, etc   Outcome: Progressing     Problem: DISCHARGE PLANNING  Goal: Discharge to home or other facility with appropriate resources  Description  INTERVENTIONS:  - Identify barriers to discharge w/patient and caregiver  - Arrange for needed discharge resources and transportation as appropriate  - Identify discharge learning needs (meds, wound care, etc )  - Arrange for interpretive services to assist at discharge as needed  - Refer to Case Management Department for coordinating discharge planning if the patient needs post-hospital services based on physician/advanced practitioner order or complex needs related to functional status, cognitive ability, or social support system  Outcome: Progressing     Problem: Knowledge Deficit  Goal: Patient/family/caregiver demonstrates understanding of disease process, treatment plan, medications, and discharge instructions  Description  Complete learning assessment and assess knowledge base    Interventions:  - Provide teaching at level of understanding  - Provide teaching via preferred learning methods  Outcome: Progressing

## 2020-01-22 NOTE — PROCEDURES
Central Line Insertion  Date/Time: 1/22/2020 2:50 PM  Performed by: JOURDAN Redman  Authorized by: JOURDAN Redman     Patient location:  ICU  Other Assisting Provider: Yes (comment) (Ivonne Chambers)    Consent:     Consent obtained:  Written (pt is currently Jeffrey List)    Consent given by:  Healthcare agent (Dr Zhang Rosas and Dr Bonnie Forrest)    Alternatives discussed:  No treatment  Universal protocol:     Patient identity confirmed: Anonymous protocol, patient vented/unresponsive  Pre-procedure details:     Hand hygiene: Hand hygiene performed prior to insertion      Sterile barrier technique: All elements of maximal sterile technique followed      Skin preparation:  ChloraPrep    Skin preparation agent: Skin preparation agent completely dried prior to procedure    Indications:     Central line indications: dialysis    Anesthesia (see MAR for exact dosages): Anesthesia method:  Local infiltration    Local anesthetic:  Lidocaine 1% w/o epi  Procedure details:     Location:  Right internal jugular    Vessel type: vein      Laterality:  Right    Approach: percutaneous technique used      Patient position:  Flat    Catheter type:  Double lumen    Catheter size:  12 Fr    Landmarks identified: yes      Ultrasound guidance: yes      Sterile ultrasound techniques: Sterile gel and sterile probe covers were used      Number of attempts:  1    Successful placement: yes    Post-procedure details:     Post-procedure:  Dressing applied and line sutured    Assessment:  Blood return through all ports, free fluid flow, no pneumothorax on x-ray and placement verified by x-ray    Post-procedure complications: none      Patient tolerance of procedure: Tolerated well, no immediate complications  Comments:      US guidance utilized, wire verified in IJ vessel prior to dilation of line

## 2020-01-22 NOTE — ED NOTES
Unsuccessful placement of NG tube insertion, paged respiratory to maneuver ETT   Dr Constantin Kraus made aware of same      Carla Araiza RN  01/21/20 2001

## 2020-01-22 NOTE — SOCIAL WORK
Consult received to help identify a decision maker      Per [place], decisional hierarchy is as follows, in decreasing order of authority:     1st - Current spouse (unless one of the parties has filed for divorce), PLUS any and all children from previous union(s) - Fernanda Morales (seperated for the past 6 years but legally ) [phone] AND Ana Kc [phone]       2nd - All adult child(nuno) from any union - no other adult children - only [address]      3rd - Parent(s), either by blood or legal adoption - unknown if they are alive or competent       4th - Adult sibling(s), either by blood or legal adoption - Yudith Magdaleno     5th - Adult grandchild(nuno) from any union - none     6th - Any adult who is familiar with pt's wishes, desires, concerns, and/or medical history - Lulú Casas - cousin in [address] [facility]  01/22/20 5:07 PM

## 2020-01-23 PROBLEM — E83.39 HYPERPHOSPHATEMIA: Status: ACTIVE | Noted: 2020-01-01

## 2020-01-23 NOTE — PROGRESS NOTES
Received a phone call from Trip Orozco, half-sister of the patient, inquiring about the status of the patient  She was unsure as to why we were reaching out to her rather than the patients son  ST DAYANA NUNEZ confirmed that the patient does have an adult son by the name of Kevin Maharaj, currently residing in [facility]  He can be reached at [phone]  He is currently unable to come the hospital being that he recently lost his license from a DUI  The soonest he would be able to come would be Saturday, in necessary  Also confirmed an estranged wife, "Shu Reynolds"  Suman Valentín , first cousin of patient, arrived on unit  Zhao Lugo stated that he received a phone call from other family members informing him of the situation  Claimed he had not seen the patient in over two years  Informed Zhao Lugo of the series of events that we were aware of leading up to today  Zhao Lugo said he was aware of ETOH use, but did not seem to think there was drug use involved  Stated there is a family history of a-fib  Zhao Lugo also stated that given the circumstances, he understood the grim outlook of return to normal function of the patient  All questions were answered  Prior to leaving, he left his phone number [phone] and said we could reach out to him as necessary

## 2020-01-23 NOTE — PLAN OF CARE
Problem: Prexisting or High Potential for Compromised Skin Integrity  Goal: Skin integrity is maintained or improved  Description  INTERVENTIONS:  - Identify patients at risk for skin breakdown  - Assess and monitor skin integrity  - Assess and monitor nutrition and hydration status  - Monitor labs   - Assess for incontinence   - Turn and reposition patient  - Assist with mobility/ambulation  - Relieve pressure over bony prominences  - Avoid friction and shearing  - Provide appropriate hygiene as needed including keeping skin clean and dry  - Evaluate need for skin moisturizer/barrier cream  - Collaborate with interdisciplinary team   - Patient/family teaching  - Consider wound care consult   Outcome: Progressing     Problem: PAIN - ADULT  Goal: Verbalizes/displays adequate comfort level or baseline comfort level  Description  Interventions:  - Encourage patient to monitor pain and request assistance  - Assess pain using appropriate pain scale  - Administer analgesics based on type and severity of pain and evaluate response  - Implement non-pharmacological measures as appropriate and evaluate response  - Consider cultural and social influences on pain and pain management  - Notify physician/advanced practitioner if interventions unsuccessful or patient reports new pain  Outcome: Progressing     Problem: INFECTION - ADULT  Goal: Absence or prevention of progression during hospitalization  Description  INTERVENTIONS:  - Assess and monitor for signs and symptoms of infection  - Monitor lab/diagnostic results  - Monitor all insertion sites, i e  indwelling lines, tubes, and drains  - Monitor endotracheal if appropriate and nasal secretions for changes in amount and color  - Graham appropriate cooling/warming therapies per order  - Administer medications as ordered  - Instruct and encourage patient and family to use good hand hygiene technique  - Identify and instruct in appropriate isolation precautions for identified infection/condition  Outcome: Progressing  Goal: Absence of fever/infection during neutropenic period  Description  INTERVENTIONS:  - Monitor WBC    Outcome: Progressing     Problem: SAFETY ADULT  Goal: Patient will remain free of falls  Description  INTERVENTIONS:  - Assess patient frequently for physical needs  -  Identify cognitive and physical deficits and behaviors that affect risk of falls    -  [place] fall precautions as indicated by assessment   - Educate patient/family on patient safety including physical limitations  - Instruct patient to call for assistance with activity based on assessment  - Modify environment to reduce risk of injury  - Consider OT/PT consult to assist with strengthening/mobility  Outcome: Progressing  Goal: Maintain or return to baseline ADL function  Description  INTERVENTIONS:  -  Assess patient's ability to carry out ADLs; assess patient's baseline for ADL function and identify physical deficits which impact ability to perform ADLs (bathing, care of mouth/teeth, toileting, grooming, dressing, etc )  - Assess/evaluate cause of self-care deficits   - Assess range of motion  - Assess patient's mobility; develop plan if impaired  - Assess patient's need for assistive devices and provide as appropriate  - Encourage maximum independence but intervene and supervise when necessary  - Involve family in performance of ADLs  - Assess for home care needs following discharge   - Consider OT consult to assist with ADL evaluation and planning for discharge  - Provide patient education as appropriate  Outcome: Progressing  Goal: Maintain or return mobility status to optimal level  Description  INTERVENTIONS:  - Assess patient's baseline mobility status (ambulation, transfers, stairs, etc )    - Identify cognitive and physical deficits and behaviors that affect mobility  - Identify mobility aids required to assist with transfers and/or ambulation (gait belt, sit-to-stand, lift, walker, cane, etc )  - [place] fall precautions as indicated by assessment  - Record patient progress and toleration of activity level on Mobility SBAR; progress patient to next Phase/Stage  - Instruct patient to call for assistance with activity based on assessment  - Consider rehabilitation consult to assist with strengthening/weightbearing, etc   Outcome: Progressing     Problem: Potential for Falls  Goal: Patient will remain free of falls  Description  INTERVENTIONS:  - Assess patient frequently for physical needs  -  Identify cognitive and physical deficits and behaviors that affect risk of falls    -  [place] fall precautions as indicated by assessment   - Educate patient/family on patient safety including physical limitations  - Instruct patient to call for assistance with activity based on assessment  - Modify environment to reduce risk of injury  - Consider OT/PT consult to assist with strengthening/mobility  Outcome: Progressing     Problem: METABOLIC, FLUID AND ELECTROLYTES - ADULT  Goal: Electrolytes maintained within normal limits  Description  INTERVENTIONS:  - Monitor labs and assess patient for signs and symptoms of electrolyte imbalances  - Administer electrolyte replacement as ordered  - Monitor response to electrolyte replacements, including repeat lab results as appropriate  - Instruct patient on fluid and nutrition as appropriate  Outcome: Progressing  Goal: Fluid balance maintained  Description  INTERVENTIONS:  - Monitor labs   - Monitor I/O and WT  - Instruct patient on fluid and nutrition as appropriate  - Assess for signs & symptoms of volume excess or deficit  Outcome: Progressing

## 2020-01-23 NOTE — ASSESSMENT & PLAN NOTE
· This is likely multifactorial related to his cardiac arrest, renal failure  · Improved, bicarb infusion discontinued

## 2020-01-23 NOTE — PROGRESS NOTES
Progress Note - Neurology   Omicron Omicron [place] [facility] 82 y o  male [phone]  Unit/Bed#: ICU 12/ICU 12    Assessment:  Patient found down, unknown duration, in asystole and was resusitated by EMS with ROSC Patient was hypothermic  Upper body myoclonic jerking that was prevalent yesterday though has diminished, though myoclonic lid and eye movements continue  Exam is grossly unchanged from yesterday other than reflexive withdrawal to noxious stimuli in BLEs  Though it is still early, prognosis for meaningful recovery continues to be poor  Will continue serial exams for now and consider repeat [facility] tomorrow  Results:  CTH negative for acute intracranial abnormality  EEG: No electrographic seizures  Myoclonus seen with bursts of activity without any changes in the background  Twitches were noted to be consisted with post-hypoxic myoclonus  Subjective:   Patient is intubated  Not sedated  ICU CRNP able to illicit weak gag this morning, uncertain if corneal's were present  History reviewed  No pertinent past medical history  History reviewed  No pertinent surgical history  History reviewed  No pertinent family history    Social History     Socioeconomic History    Marital status: Single     Spouse name: None    Number of children: None    Years of education: None    Highest education level: None   Occupational History    None   Social Needs    Financial resource strain: None    Food insecurity:     Worry: None     Inability: None    Transportation needs:     Medical: None     Non-medical: None   Tobacco Use    Smoking status: None   Substance and Sexual Activity    Alcohol use: None    Drug use: None    Sexual activity: None   Lifestyle    Physical activity:     Days per week: None     Minutes per session: None    Stress: None   Relationships    Social connections:     Talks on phone: None     Gets together: None     Attends Judaism service: None     Active member of club or organization: None     Attends meetings of clubs or organizations: None     Relationship status: None    Intimate partner violence:     Fear of current or ex partner: None     Emotionally abused: None     Physically abused: None     Forced sexual activity: None   Other Topics Concern    None   Social History Narrative    None       Medications: Reviewed in detail by me  ROS: Review of Systems Unable to be obtained due to AMS/intubated  Vitals: /64   Pulse 82   Temp 99 5 °F (37 5 °C) (Temporal)   Resp 21   Wt 132 kg (290 lb 9 1 oz)   SpO2 97%     Physical Exam:  Physical Exam   Constitutional: He appears well-developed  HENT:   Head: Normocephalic and atraumatic  Neck: Normal range of motion  Neck supple  Cardiovascular: Normal rate and regular rhythm  Pulmonary/Chest:   Intubated   Genitourinary:   Genitourinary Comments: Scrotal edema   Musculoskeletal: He exhibits edema (significant diffuse edema)  Neurological:   obtunded   Skin: Skin is warm and dry  Neurologic Exam     Mental Status   Obtunded  No command following     Cranial Nerves     CN VII   Facial expression full, symmetric  Dysconjugate gaze with L eye lower than R  Multidirectional nystagmus in mid position  Twitching movement of bilateral eyelids, occasionally appearing rhythmic  No gag  No corneal's  Motor Exam No AROM     Sensory Exam   Minimal response to noxious stimuli to feet, appearing reflexic  Gait, Coordination, and Reflexes Trace BUE bicep reflex       Labs: Reviewed in detail by me  Imaging: I have personally reviewed pertinent imaging and PACS reports  VTE Prophylaxis: Heparin    Total time spent today 25 minutes  Greater than 50% of total time was spent with the patient and / or family counseling and / or coordination of care   A description of the counseling / coordination of care: discussing case with RN, attending neurologist and ICU team

## 2020-01-23 NOTE — ASSESSMENT & PLAN NOTE
· His baseline renal function is unknown  · We will monitor his renal indices and urine output closely  · Will aim for a urine output > 100 cc/hr

## 2020-01-23 NOTE — QUICK NOTE
Family updated    Azra Winstonmohamud (wife) updated via phone on current events and condition  She states she would like to discuss with the pt's son Ruby Gastelum to verify medical decisions at this time  She did comment that the patient would not want life support of any kind  Ruby Gastelum (son) updated via phone  He has been estranged from his father for several years  Would like to discuss with Azra Lopez regarding medical decisions however he is likely to defer to her as decision maker  He did state pt is a  and if any help can be offered through the [place] he would like to see that happen

## 2020-01-23 NOTE — PROGRESS NOTES
Progress Note - Omicron Omicron Four [place] And Twenty-Two 1/21/1868, 82 y o  male MRN: [id number]    Unit/Bed#: ICU 12 Encounter: [id number]    Primary Care Provider: No primary care provider on file  Date and time admitted to hospital: 1/21/2020 11:30 PM        * Cardiac arrest [facility])  Assessment & Plan  · The patient arrived as a transfer from [facility] [place] after he was found down in a park and pulseless  Given his need for critical care management, he is admitted to the ICU  This will require greater then a 2 midnight stay, therefore the patient will be placed in inpatient status  · Troponin trend 0 06, 0 06, 0 07, 0 07  · Echo-EF-61%, mod hypokinesis of basal mid inferolateral walls, grade 2 diastolic dysfunction  · He had an initial temp of 92 and was asystolic, TTM deferred  · No neurologic recovery at this point    Acute respiratory failure (HCC)  Assessment & Plan  · Will continue vent management for now with a goal to maintain his oxygen saturations greater then 90%  · His CXR looks like he may have a developing right sided infiltrate- possible aspiration  · Continue cefepime and flagyl, day #2    Acute renal failure (HCC)  Assessment & Plan  · His baseline renal function is unknown  · We will monitor his renal indices and urine output closely  · Will aim for a urine output > 397 cc/hr    Metabolic acidosis  Assessment & Plan  · This is likely multifactorial related to his cardiac arrest, renal failure  · Improved, bicarb infusion discontinued      Hyperglycemia  Assessment & Plan  · It is unknown whether the patient has diabetes but his blood sugar was elevated on admission  · Insulin infusion has been discontinued and glucose levels have normalized    Hyperkalemia  Assessment & Plan  · Possibly from renal failure  · Will recheck his level and treat accordingly    Hypocalcemia  Assessment & Plan  · Will trend and replete as needed    Hypothermia  Assessment & Plan  · Will allow permissive hypothermia given his cardiac arrest    Azotemia  Assessment & Plan  Hemodialysis    Encephalopathy acute  Assessment & Plan  · Possibly anoxic given unknown downtime  · There was also some evidence of myoclonus with spontaneous eye opening and muscle twitching   · EEG shows diffuse slowing, post-hypoxic myoclonus activity, no electrographic seizures  · Keppra discontinued  · Propofol infusion initiated for significant myoclonus that triggers the vent    Anemia  Assessment & Plan  · The patient was found to have an initial hgb of 7 6, this dropped to 6 5 in the setting of fluid administration  · Will will transfuse now to keep his hgb > 7  · We will monitor for bleeding  · Baseline hgb is unknown    ----------------------------------------------------------------------------------------  HPI/24hr events: No events overnight  Disposition: Continue Critical Care   Code Status: Level 1 - Full Code  ---------------------------------------------------------------------------------------  SUBJECTIVE  N/A    Review of Systems  Review of systems was reviewed and negative unless stated above in HPI/24-hour events   ---------------------------------------------------------------------------------------  OBJECTIVE    Vitals   Vitals:    20 0200 20 0300 20 0400 20 0430   BP:       BP Location:       Pulse: 88 82 84    Resp: 12 22 14    Temp:   98 8 °F (37 1 °C)    TempSrc:   Temporal    SpO2: 95% 98% 96% 97%   Weight:         Temp (24hrs), Av 9 °F (36 6 °C), Min:97 1 °F (36 2 °C), Max:98 8 °F (37 1 °C)  Current: Temperature: 98 8 °F (37 1 °C)  Arterial Line BP: 124/56  Arterial Line MAP (mmHg): 78 mmHg  SpO2: SpO2: 97 %       Physical Exam   Constitutional: He appears well-developed and well-nourished  Unkempt, unresponsive, intubated   HENT:   Head: Normocephalic and atraumatic     Right Ear: External ear normal    Left Ear: External ear normal    Nose: Nose normal    Mouth/Throat: Oropharynx is clear and moist    Eyes: Pupils are equal, round, and reactive to light  Conjunctivae and EOM are normal    Neck: Neck supple  Thick neck   Cardiovascular: Normal rate, regular rhythm, normal heart sounds and intact distal pulses  Pulmonary/Chest: Effort normal  He has wheezes  Expiratory wheezing   Abdominal: Soft  Bowel sounds are normal    Large amounts of formed stool overnight  Musculoskeletal: He exhibits edema  anasarcic   Neurological:   No pupillary response, no corneal, no gag  +cough with deep suction  Myoclonus   Skin: Skin is warm and dry  Capillary refill takes 2 to 3 seconds  Psychiatric:   Unable to assess   Nursing note and vitals reviewed        Invasive/non-invasive ventilation settings   Respiratory    Lab Data (Last 4 hours)      01/23 0429            pH, Arterial       7 372           pCO2, Arterial       37 0           pO2, Arterial       88 9           HCO3, Arterial       21 0           Base Excess, Arterial       -3 8                O2/Vent Data       01/23 0430   Most Recent         Vent Mode AC/VC  AC/VC      Resp Rate (BPM) (BPM) 22  22      Vt (mL) (mL) 450  450      FIO2 (%) (%) 40  40      PEEP (cmH2O) (cmH2O) 5  5      MV 10 5  10 5                  Laboratory and Diagnostics:  Results from last 7 days   Lab Units 01/23/20 0429 01/22/20 1948 01/22/20 1255 01/22/20 0455 01/21/20 2355 01/21/20  1648   WBC Thousand/uL 8 04  --   --  6 06 6 99 6 30   HEMOGLOBIN g/dL 7 2* 7 2* 7 5* 6 9* 6 5* 7 6*   HEMATOCRIT % 23 4*  --   --  22 9* 22 2* 26 4*   PLATELETS Thousands/uL 164  --   --  167 190 223   NEUTROS PCT % 77*  --   --  83* 83*  --    MONOS PCT % 11  --   --  11 12  --    MONO PCT %  --   --   --   --   --  11*     Results from last 7 days   Lab Units 01/23/20 0429 01/22/20 1948 01/22/20  1254 01/22/20  0455 01/21/20  2355 01/21/20  1648   SODIUM mmol/L 142 142 142 141 143 139   POTASSIUM mmol/L 5 1 5 3 6 5* 6 6* 6 9* 6 6*   CHLORIDE mmol/L 109* 109* 111* 113* 114* 113*   CO2 mmol/L 24 23 19* 18* 18* 12*   ANION GAP mmol/L 9 10 12 10 11 14   BUN mg/dL 77 73* 92* 93* 86* 81*   CREATININE mg/dL 3 23 2 92* 3 33* 3 07* 3 04* 3 04*   CALCIUM mg/dL 7 0* 7 0* 7 2* 7 1* 6 9* 7 9*   GLUCOSE RANDOM mg/dL 110 112 106 47* 89 364*   ALT U/L  --   --   --   --  39 27   AST U/L  --   --   --   --  43 22   ALK PHOS U/L  --   --   --   --  129* 126*   ALBUMIN g/dL  --   --   --   --  2 8* 3 6   TOTAL BILIRUBIN mg/dL  --   --   --   --  0 33 0 30     Results from last 7 days   Lab Units 01/23/20  0429 01/22/20  0455 01/21/20  2355   MAGNESIUM mg/dL 1 9 2 1 2 1   PHOSPHORUS mg/dL 5 0*  --   --       Results from last 7 days   Lab Units 01/21/20  2355   INR  1 20*      Results from last 7 days   Lab Units 01/22/20  0455 01/22/20  0039 01/21/20  2355 01/21/20  1648   TROPONIN I ng/mL 0 07* 0 07* 0 06* 0 06*     Results from last 7 days   Lab Units 01/21/20  2355 01/21/20  1928   LACTIC ACID mmol/L 1 3 1 6     ABG:  Results from last 7 days   Lab Units 01/23/20  0429   PH ART  7 372   PCO2 ART mm Hg 37 0   PO2 ART mm Hg 88 9   HCO3 ART mmol/L 21 0*   BASE EXC ART mmol/L -3 8   ABG SOURCE  Line, Arterial     VBG:  Results from last 7 days   Lab Units 01/23/20  0429   ABG SOURCE  Line, Arterial     Results from last 7 days   Lab Units 01/21/20  2355   PROCALCITONIN ng/ml 0 23       Micro        EKG: NSR  Imaging: I have personally reviewed pertinent reports  Intake and Output  I/O       01/21 0701 - 01/22 0700 01/22 0701 - 01/23 0700    I V  (mL/kg)  2400 9 (17 7)    Blood 350     IV Piggyback 250 250    Total Intake(mL/kg) 600 (4 4) 2650 9 (19 5)    Urine (mL/kg/hr) 45 795 (0 2)    Other  1500    Stool  0    Total Output 45 2295    Net +555 +355 9          Unmeasured Stool Occurrence  3 x          Height and Weights      IBW: -88 kg  There is no height or weight on file to calculate BMI    Weight (last 2 days)     Date/Time   Weight    01/22/20 0600   136 (299 38)    01/21/20 2350 133 (293 65)                Nutrition       Diet Orders   (From admission, onward)             Start     Ordered    01/21/20 2347  Diet NPO; Sips with meds  Diet effective now     Question Answer Comment   Diet Type NPO    NPO Except: Sips with meds    RD to adjust diet per protocol?  No        01/21/20 2348                  Active Medications  Scheduled Meds:    Current Facility-Administered Medications:  acetaminophen 650 mg Oral Q6H PRN JOURDAN Sánchez    cefepime 1,000 mg Intravenous Q12H JOURDAN Walls Last Rate: Stopped (01/22/20 2044)   chlorhexidine 15 mL Swish & Spit Q12H [address], DENGNP    fomepizole (ANTIZOL) IVPB 10 mg/kg Intravenous Q12H Nigel Sicard, CRNP Last Rate: Stopped (01/23/20 0300)   Followed by        Adam Sánchez ON 1/25/2020] fomepizole (ANTIZOL) IVPB 15 mg/kg Intravenous Q12H Nicole JOURDAN Moulton    heparin (porcine) 5,000 Units Subcutaneous [facility] JOURDAN Walls    levalbuterol 1 25 mg Nebulization Q6H JOURDAN Mccallum    LORazepam 2 mg Intravenous Q4H PRN JOURDAN Sánchez    metroNIDAZOLE 500 mg Intravenous Q8H JOURDAN Walls Last Rate: Stopped (01/23/20 0057)   pantoprazole 40 mg Intravenous Q24H [address] Nicole K JOURDAN Jorgensen    propofol 5-50 mcg/kg/min Intravenous Titrated JOURDAN Mccallum Last Rate: 15 mcg/kg/min (01/23/20 0541)   sodium bicarbonate infusion 125 mL/hr Intravenous Continuous Nicole K JOURDAN Jorgensen Last Rate: Stopped (01/23/20 0200)     Continuous Infusions:    propofol 5-50 mcg/kg/min Last Rate: 15 mcg/kg/min (01/23/20 0541)   sodium bicarbonate infusion 125 mL/hr Last Rate: Stopped (01/23/20 0200)     PRN Meds:     acetaminophen 650 mg Q6H PRN   LORazepam 2 mg Q4H PRN     ---------------------------------------------------------------------------------------  Advance Directive and Living Will:      Power of :    POLST:    ---------------------------------------------------------------------------------------  Counseling / Coordination of Care  Total Critical Care time spent 35 minutes excluding procedures, teaching and family updates  JOURDAN Norris      Portions of the record may have been created with voice recognition software  Occasional wrong word or "sound a like" substitutions may have occurred due to the inherent limitations of voice recognition software    Read the chart carefully and recognize, using context, where substitutions have occurred

## 2020-01-23 NOTE — ASSESSMENT & PLAN NOTE
· Possibly anoxic given unknown downtime  · There was also some evidence of myoclonus with spontaneous eye opening and muscle twitching   · EEG shows diffuse slowing, post-hypoxic myoclonus activity, no electrographic seizures  · Keppra discontinued  · Propofol infusion initiated for significant myoclonus that triggers the vent

## 2020-01-23 NOTE — PROGRESS NOTES
Procedure Note - Nephrology   Omicron Omicron Four [place] And Twenty-Two 82 y o  male MRN: [id number]  Unit/Bed#: ICU 12 Encounter: [id number]    Procedure note-hemodialysis(15587)  Assessment / Plan:  1  DILCIA, present on admission, in setting of cardiac arrest/probable ATN  -b/l sCr unknown  -admit sCr 3 04, now up to 3 33 without significant urine output  Patient initiated on hemodialysis yesterday for hyperkalemia as well as oliguric renal failure  -urine output is improving today   -patient seen examined by me on hemodialysis for patient's 2nd treatment  Plan for 3 hr with 1 5 L UF goal   -avoid IV dye/nephrotoxins/NSAIDs  -Goal is to maintain SBP > 120, MAP > 65 for renal perfusion  -monitor serial labs  -will evaluate daily for RRT needs     2  Hyperkalemia - resolved s/p HD initiation  UOP improving  Monitor K     3  Non elevated anion gap metabolic acidosis -process with bicarbonate drip as well as correction with hemodialysis  Bicarb at goal      4  Azotemia with possible uremia - BUN improving with dialysis  Monitor this      5  Anemia - Hgb had dropped into the 6s, now 7 2 s/p 1u blood transfusion during admission  Monitor CBC  Transfuse prn Hgb < 7     6  Elevated troponin ? D/t demand ischemia     7  Hyperphosphatemia in the setting of acute kidney injury-expect improvement status post HD, monitor phos    Subjective:   Patient seen and examined by me on hemodialysis at approximately 10:36 a m     Blood pressure 165/65, UF oal  1 5 L, blood flow 300 milliliters/minute, dialysate flow 500 milliliters/minute  Unable to elicit HPI or review systems as patient is intubated and sedated  Objective:     Vitals: Blood pressure 166/64, pulse 88, temperature 99 6 °F (37 6 °C), temperature source Temporal, resp  rate 12, weight 132 kg (290 lb 9 1 oz), SpO2 98 %  ,There is no height or weight on file to calculate BMI  Temp (24hrs), Av 7 °F (37 1 °C), Min:98 °F (36 7 °C), Max:99 6 °F (37 6 °C)      Weight (last 2 days)     Date/Time   Weight    01/23/20 0559   132 (290 57)    01/22/20 0600   136 (299 38)    01/21/20 2350   133 (293 65)                Intake/Output Summary (Last 24 hours) at 1/23/2020 1207  Last data filed at 1/23/2020 1100  Gross per 24 hour   Intake 3025 34 ml   Output 3170 ml   Net -144 66 ml     I/O last 24 hours: In: 3025 3 [I V :2625 3; IV [id number]:116]  Out: 5418 [Urine:870; Emesis/NG output:800; Other:1500]        Physical Exam:   Physical Exam   Constitutional: He appears well-developed and well-nourished  No distress  obese   HENT:   Head: Normocephalic and atraumatic  Mouth/Throat: No oropharyngeal exudate  +ETT   Eyes: Right eye exhibits no discharge  Left eye exhibits no discharge  No scleral icterus  Neck: Neck supple  No thyromegaly present  Cardiovascular: Normal rate, regular rhythm and normal heart sounds  Pulmonary/Chest: Effort normal  He has no wheezes  He has no rales  Coarse BS b/l   Abdominal: Soft  He exhibits distension  There is no tenderness  Genitourinary:   Genitourinary Comments: +cortés   Musculoskeletal: He exhibits edema (+anasarca)  Neurological:   Intubated/sedated   Skin: Skin is warm and dry  No rash noted  He is not diaphoretic  Psychiatric:   Unable to assess as patient Intubated/sedated   Vitals reviewed        Invasive Devices     Central Venous Catheter Line            CVC Central Lines 01/22/20 Double less than 1 day          Peripheral Intravenous Line            Peripheral IV 01/21/20 Left Forearm 1 day    Peripheral IV 01/21/20 Right Antecubital 1 day    Peripheral IV 01/22/20 Left Antecubital 1 day          Arterial Line            Arterial Line 01/22/20 Right Radial 1 day          Drain            NG/OG/Enteral Tube Orogastric Center mouth 1 day    Urethral Catheter Double-lumen 1 day          Airway            ETT  Cuffed 8 mm 1 day                Medications:    Scheduled Meds:  Current Facility-Administered Medications:  acetaminophen 650 mg Oral Q6H PRN JOURDAN Sánchez    chlorhexidine 15 mL Swish & Spit Q12H [address] JOURDAN Gomez    heparin (porcine) 5,000 Units Subcutaneous [facility] JOURDAN Gomez    LORazepam 2 mg Intravenous Q4H PRN JOURDAN Sánchez    pantoprazole 40 mg Intravenous Q24H [address] JOURDAN Houston    propofol 5-50 mcg/kg/min Intravenous Titrated JOURDAN Sawant Last Rate: Stopped (01/23/20 0900)   sodium chloride 3 mL Nebulization Q6H Ottoniel Tim DO        PRN Meds:   acetaminophen    LORazepam    Continuous Infusions:  propofol 5-50 mcg/kg/min Last Rate: Stopped (01/23/20 0900)           LAB RESULTS:      Results from last 7 days   Lab Units 01/23/20  0429 01/22/20  1948 01/22/20  1255 01/22/20  1254 01/22/20  0455 01/21/20  2355 01/21/20  1648   WBC Thousand/uL 8 04  --   --   --  6 06 6 99 6 30   HEMOGLOBIN g/dL 7 2* 7 2* 7 5*  --  6 9* 6 5* 7 6*   HEMATOCRIT % 23 4*  --   --   --  22 9* 22 2* 26 4*   PLATELETS Thousands/uL 164  --   --   --  167 190 223   NEUTROS PCT % 77*  --   --   --  83* 83*  --    LYMPHS PCT % 12*  --   --   --  6* 5*  --    LYMPHO PCT %  --   --   --   --   --   --  23*   MONOS PCT % 11  --   --   --  11 12  --    MONO PCT %  --   --   --   --   --   --  11*   EOS PCT % 0  --   --   --  0 0  --    POTASSIUM mmol/L 5 1 5 3  --  6 5* 6 6* 6 9* 6 6*   CHLORIDE mmol/L 109* 109*  --  111* 113* 114* 113*   CO2 mmol/L 24 23  --  19* 18* 18* 12*   BUN mg/dL 77 73*  --  92* 93* 86* 81*   CREATININE mg/dL 3 23 2 92*  --  3 33* 3 07* 3 04* 3 04*   CALCIUM mg/dL 7 0* 7 0*  --  7 2* 7 1* 6 9* 7 9*   ALK PHOS U/L  --   --   --   --   --  129* 126*   ALT U/L  --   --   --   --   --  39 27   AST U/L  --   --   --   --   --  43 22   MAGNESIUM mg/dL 1 9  --   --   --  2 1 2 1  --    PHOSPHORUS mg/dL 5 0*  --   --   --   --   --   --        CUTURES:  No results found for: BLOODCX, URINECX              Portions of the record may have been created with voice recognition software  Occasional wrong word or "sound a like" substitutions may have occurred due to the inherent limitations of voice recognition software  Read the chart carefully and recognize, using context, where substitutions have occurred  If you have any questions, please contact the dictating provider

## 2020-01-23 NOTE — PROGRESS NOTES
Progress Note (Phone) - Medical Toxicology  Omicron Omicron Four [place] And Twenty-Two 82 y o  male MRN: [id number]  Unit/Bed#: ICU 12 Encounter: [id number]        Hx and PE limited by the dynamics of a phone consultation  I have not personally interviewed or evaluated the patient, but only advised based on the information provided to me  Primary provider is responsible for all clinical decisions  Pertinent history, physical exam and clinical findings and course discussed:      Reason for current consult: cardiac arrest, metabolic acidosis, r/o toxic alcohol       ASSESSMENT:  Patient of unknown age with metabolic acidosis and encephalopathy  1  Acute metabolic acidosis with elevated anion gap, respiratory acidosis and acidemia  2  Cardiac arrest  3  Acute renal failure  4  Hypothermia  5  Anemia   6  Elevated BNP  7  Hyperglycemia  8  Hypocalcemia  9  Hyperkalemia  10  Elevated troponin  11  Unresponsive  12  Acute respiratory failure    RECOMMENDATIONS:  Fomepizole may be stopped as his toxic alcohol levels have returns undetectable, meaning that if patient had an ingestion, all of the parent compound has been metabolized  There is nothing left to block  Continue supportive care and consider diaylsis as needed if patient continues to have significant acidosis and DILCIA  Subjective:   Patient remains unresponsive, intubated    Objective:     Physical Exam    Vitals: Blood pressure 166/64, pulse 84, temperature 98 4 °F (36 9 °C), temperature source Temporal, resp  rate 14, weight 132 kg (290 lb 9 1 oz), SpO2 96 %        Intake/Output Summary (Last 24 hours) at 1/23/2020 0913  Last data filed at 1/23/2020 0600  Gross per 24 hour   Intake 2675 34 ml   Output 3170 ml   Net -494 66 ml       Physical Exam: per primary team    Invasive Devices     Central Venous Catheter Line            CVC Central Lines 01/22/20 Double less than 1 day          Peripheral Intravenous Line            Peripheral IV 01/21/20 Left Forearm 1 day    Peripheral IV 01/21/20 Right Antecubital 1 day    Peripheral IV 01/22/20 Left Antecubital less than 1 day          Arterial Line            Arterial Line 01/22/20 Right Radial 1 day          Drain            NG/OG/Enteral Tube Orogastric Center mouth 1 day    Urethral Catheter Double-lumen 1 day          Airway            ETT  Cuffed 8 mm 1 day                Lab, Imaging and other studies: I have personally reviewed pertinent reports

## 2020-01-23 NOTE — QUICK NOTE
Discussed ongoing care with ICU team this afternoon, at this point I think etiology of patient's presentation less likely due to toxic alcohols  However given amount of exogenous bicarb administration and now hemodialysis which was needed due to hyperkalemia, we cannot rely on acid/base status to rule out toxic alcohols (particularly ethylene glycol in this case), and so I have recommended that fomepizole continue to be given  Dosing at this point is 10 mg/kg every 12 hours  Hemodialysis is a definitive treatment for ethylene glycol toxicity, however several sessions are sometimes needed depending on how high the level is, and so until we have levels back we cannot be certain of adequate treatment  Therefore fomepizole needs to be continued until lab results are available  Unfortunately lab specimens drawn at [facility] last night did not make it to [facility] for toxic alcohols testing, and so I recommended that these be redrawn and sent  We will continue to follow

## 2020-01-23 NOTE — PLAN OF CARE
Removing 1 5L as tolerated in a 3 hr dialysis tx per physician order, using 2K bath      Problem: METABOLIC, FLUID AND ELECTROLYTES - ADULT  Goal: Electrolytes maintained within normal limits  Description  INTERVENTIONS:  - Monitor labs and assess patient for signs and symptoms of electrolyte imbalances  - Administer electrolyte replacement as ordered  - Monitor response to electrolyte replacements, including repeat lab results as appropriate  - Instruct patient on fluid and nutrition as appropriate  Outcome: Progressing     Problem: METABOLIC, FLUID AND ELECTROLYTES - ADULT  Goal: Fluid balance maintained  Description  INTERVENTIONS:  - Monitor labs   - Monitor I/O and WT  - Instruct patient on fluid and nutrition as appropriate  - Assess for signs & symptoms of volume excess or deficit  Outcome: Progressing

## 2020-01-23 NOTE — ASSESSMENT & PLAN NOTE
· Will continue vent management for now with a goal to maintain his oxygen saturations greater then 90%  · His CXR looks like he may have a developing right sided infiltrate- possible aspiration  · Continue cefepime and flagyl, day #2

## 2020-01-23 NOTE — HEMODIALYSIS
Stable 3 hr dialysis tx  Pt sedated on vent  Adequate flow from catheter during tx  2003ml uf removed  AM bed wt 131 8kg, post 128 7kg, -3 1kg

## 2020-01-23 NOTE — ASSESSMENT & PLAN NOTE
· It is unknown whether the patient has diabetes but his blood sugar was elevated on admission  · Insulin infusion has been discontinued and glucose levels have normalized

## 2020-01-23 NOTE — ASSESSMENT & PLAN NOTE
· The patient arrived as a transfer from [facility] after he was found down in a park and pulseless  Given his need for critical care management, he is admitted to the ICU   This will require greater then a 2 midnight stay, therefore the patient will be placed in inpatient status  · Troponin trend 0 06, 0 06, 0 07, 0 07  · Echo-EF-61%, mod hypokinesis of basal mid inferolateral walls, grade 2 diastolic dysfunction  · He had an initial temp of 92 and was asystolic, TTM deferred  · No neurologic recovery at this point

## 2020-01-24 NOTE — PROGRESS NOTES
Progress Note - Nephrology   Eleni Soto 64 y o  male MRN: [id number]  Unit/Bed#: ICU 12 Encounter: [id number]      Assessment / Plan:  1  DILCIA, present on admission, in setting of cardiac arrest/probable ATN  -b/l sCr unknown  -admit sCr 3 04, peak up to 3 33  -Patient initiated on hemodialysis 20 for hyperkalemia as well as oliguric renal failure  -urine output is improving today   -will further augment this with 80mg IV lasix x 1  Will hold on dialysis today    -avoid IV dye/nephrotoxins/NSAIDs  -Goal is to maintain SBP > 120, MAP > 65 for renal perfusion  -monitor daily labs  -will evaluate daily for RRT needs pending goals of care determination  Neurology evaluating the patient       2  Hyperkalemia - resolved s/p HD initiation  UOP improving  Monitor K      3  Non elevated anion gap metabolic acidosis - resolved     4  Azotemia with possible uremia - BUN improving, last HD yesterday, 20     5  Anemia - Hgb had dropped into the 6s, now 7 2 s/p 1u blood transfusion during admission  Monitor CBC  Transfuse prn Hgb < 7     6  Elevated troponin ? D/t demand ischemia      7  Hyperphosphatemia in the setting of acute kidney injury-expect improvement status post HD, monitor phos, last phos 5        Subjective:   Unable to elicit HPI review of systems as patient is intubated and sedated  He is on 40% FiO2  Objective:     Vitals: Blood pressure (!) 186/64, pulse 82, temperature (!) 101 8 °F (38 8 °C), temperature source Temporal, resp  rate 17, weight 129 kg (284 lb 9 8 oz), SpO2 98 %  ,There is no height or weight on file to calculate BMI  Temp (24hrs), Av 2 °F (37 9 °C), Min:98 9 °F (37 2 °C), Max:101 8 °F (38 8 °C)      Weight (last 2 days)     Date/Time   Weight    20 0600   129 (284 61)    20 0559   132 (290 57)    20 0600   136 (299 38)                Intake/Output Summary (Last 24 hours) at 2020 1234  Last data filed at 2020 1147  Gross per 24 hour   Intake 930 ml   Output 3488 ml   Net -2558 ml     I/O last 24 hours: In: 1280 [I V :500; IV Piggyback:150; Feedings:630]  Out: 5752 [Urine:1885; Emesis/NG output:400; Other:2003]        Physical Exam:   Physical Exam   Constitutional: He appears well-developed and well-nourished  No distress  obese   HENT:   Head: Normocephalic and atraumatic  Mouth/Throat: No oropharyngeal exudate  +ETT   Eyes: Right eye exhibits no discharge  Left eye exhibits no discharge  No scleral icterus  Neck: Neck supple  No thyromegaly present  Cardiovascular: Normal rate, regular rhythm and normal heart sounds  Pulmonary/Chest: Effort normal  He has no wheezes  He has no rales  Coarse BS b/l   Abdominal: Soft  Bowel sounds are normal  He exhibits no distension  There is no tenderness  +rectal tube   Genitourinary:   Genitourinary Comments: +cortés   Musculoskeletal: He exhibits edema (+anasarca)  Neurological:   Intubated/sedated   Skin: Skin is warm and dry  No rash noted  He is not diaphoretic  Psychiatric:   Unable to elicit as patient Intubated/sedated   Vitals reviewed        Invasive Devices     Central Venous Catheter Line            CVC Central Lines 01/22/20 Double 1 day          Peripheral Intravenous Line            Peripheral IV 01/21/20 Left Forearm 2 days    Peripheral IV 01/21/20 Right Antecubital 2 days    Peripheral IV 01/22/20 Left Antecubital 2 days          Arterial Line            Arterial Line 01/22/20 Right Radial 2 days          Drain            NG/OG/Enteral Tube Orogastric Center mouth 2 days    Urethral Catheter Double-lumen 2 days    Rectal Tube With balloon less than 1 day          Airway            ETT  Cuffed 8 mm 2 days                Medications:    Scheduled Meds:  Current Facility-Administered Medications:  acetaminophen 650 mg Oral Q6H PRN JOURDAN Sánchez    chlorhexidine 15 mL Swish & Spit Q12H [address] [place] JOURDAN Rubio    heparin (porcine) 5,000 Units Subcutaneous [facility] Taiwo Ramy Ewa, CRNP    hydrALAZINE 20 mg Intravenous Q6H PRN Delfin Ahr, CRNP    metoprolol 5 mg Intravenous Q6H PRN Delfin Ahr, CRNP    metoprolol tartrate 25 mg Oral Q12H [address] Delfin Ahr, CRNP    pantoprazole 40 mg Intravenous Q24H [address] Nicole PEPPER Jorgensen, CRNP    propofol 5-50 mcg/kg/min Intravenous Titrated JOURDAN Wallace Last Rate: Stopped (01/23/20 0900)       PRN Meds:   acetaminophen    hydrALAZINE    metoprolol    Continuous Infusions:  propofol 5-50 mcg/kg/min Last Rate: Stopped (01/23/20 0900)           LAB RESULTS:      Results from last 7 days   Lab Units 01/24/20  0403 01/23/20  0429 01/22/20  1948 01/22/20  1255 01/22/20  1254 01/22/20  0455 01/21/20  2355 01/21/20  1648   WBC Thousand/uL 7 58 8 04  --   --   --  6 06 6 99 6 30   HEMOGLOBIN g/dL 7 4* 7 2* 7 2* 7 5*  --  6 9* 6 5* 7 6*   HEMATOCRIT % 24 1* 23 4*  --   --   --  22 9* 22 2* 26 4*   PLATELETS Thousands/uL 165 164  --   --   --  167 190 223   NEUTROS PCT % 77* 77*  --   --   --  83* 83*  --    LYMPHS PCT % 10* 12*  --   --   --  6* 5*  --    LYMPHO PCT %  --   --   --   --   --   --   --  23*   MONOS PCT % 12 11  --   --   --  11 12  --    MONO PCT %  --   --   --   --   --   --   --  11*   EOS PCT % 0 0  --   --   --  0 0  --    POTASSIUM mmol/L 4 2 5 1 5 3  --  6 5* 6 6* 6 9* 6 6*   CHLORIDE mmol/L 106 109* 109*  --  111* 113* 114* 113*   CO2 mmol/L 25 24 23  --  19* 18* 18* 12*   BUN mg/dL 60* 77 73*  --  92* 93* 86* 81*   CREATININE mg/dL 3 15* 3 23 2 92*  --  3 33* 3 07* 3 04* 3 04*   CALCIUM mg/dL 7 3* 7 0* 7 0*  --  7 2* 7 1* 6 9* 7 9*   ALK PHOS U/L  --   --   --   --   --   --  129* 126*   ALT U/L  --   --   --   --   --   --  39 27   AST U/L  --   --   --   --   --   --  43 22   MAGNESIUM mg/dL  --  1 9  --   --   --  2 1 2 1  --    PHOSPHORUS mg/dL  --  5 0*  --   --   --   --   --   --        CUTURES:  No results found for: Coleman Monteiro              Portions of the record may have been created with voice recognition software  Occasional wrong word or "sound a like" substitutions may have occurred due to the inherent limitations of voice recognition software  Read the chart carefully and recognize, using context, where substitutions have occurred  If you have any questions, please contact the dictating provider

## 2020-01-24 NOTE — PROGRESS NOTES
Progress Note - Neurology   Rj Vale 64 y o  male [id number]  Unit/Bed#: ICU 12/ICU 12    Assessment:  Rj Vale is a 64 y o  male who was found down for unknown duration, in asystole and was resuscitated by EMS with ROSC on 01/21/2020  Patient was hypothermic  Patient no longer has upper body myoclonic jerking, however myoclonic lid and eye movements continue, but less prominent today compared to yesterday's exam   Today, patient has no reflexive withdrawal to noxious stimuli in any extremity  Prognosis for meaningful recovery continues to be poor  We will continue serial exams for now  Plan:  - will wait on repeat CT head at this time  - continue serial exams  - family is discussing continuing treatment vs comfort care  - frequent neuro checks  Continue to monitor and notify Neurology for any acute changes  - Medical management and supportive care per ICU team   Correction of any metabolic or infectious disturbances  Subjective:   Patient is currently intubated, but not sedated  He is slightly over-breathing the vent  ICU CRNP was able to elicit a weak gag with deep suction, but normal gag reflex is not intact  Patient cannot follow any commands   Continues to have slight multi-directional myoclonus of eyelids and eyes  Unable to elicit corneal reflex, or withdrawal to noxious stimuli in any extremity  Slight extensor/decerebrate posturing with noxious stimuli in bilateral UEs  History reviewed  No pertinent past medical history  History reviewed  No pertinent surgical history  History reviewed  No pertinent family history    Social History     Socioeconomic History    Marital status: Single     Spouse name: None    Number of children: None    Years of education: None    Highest education level: None   Occupational History    None   Social Needs    Financial resource strain: None    Food insecurity:     Worry: None     Inability: None    Transportation needs: Medical: None     Non-medical: None   Tobacco Use    Smoking status: None   Substance and Sexual Activity    Alcohol use: None    Drug use: None    Sexual activity: None   Lifestyle    Physical activity:     Days per week: None     Minutes per session: None    Stress: None   Relationships    Social connections:     Talks on phone: None     Gets together: None     Attends Jew service: None     Active member of club or organization: None     Attends meetings of clubs or organizations: None     Relationship status: None    Intimate partner violence:     Fear of current or ex partner: None     Emotionally abused: None     Physically abused: None     Forced sexual activity: None   Other Topics Concern    None   Social History Narrative    None         Medications: All current active meds have been reviewed and current meds:  Scheduled Meds:  Current Facility-Administered Medications:  acetaminophen 650 mg Oral Q6H PRN JOURDAN Sánchez    chlorhexidine 15 mL Swish & Spit Q12H [address] JOURDAN Gonzalez    heparin (porcine) 5,000 Units Subcutaneous [facility] JOURDAN Razo    hydrALAZINE 20 mg Intravenous Q6H PRN JOURDAN Honeycutt    metoprolol 5 mg Intravenous Q6H PRN JOURDAN Honeycutt    metoprolol tartrate 25 mg Oral Q12H [address] ValentinoJOURDAN Witt    pantoprazole 40 mg Intravenous Q24H [address] JOURDAN [place]    propofol 5-50 mcg/kg/min Intravenous Titrated JOURDAN Mcleod Last Rate: Stopped (01/23/20 0900)     Continuous Infusions:  propofol 5-50 mcg/kg/min Last Rate: Stopped (01/23/20 0900)     PRN Meds:   acetaminophen    hydrALAZINE    metoprolol       ROS:   Review of Systems   Unable to perform ROS: Patient unresponsive             Vitals:   BP (!) 186/64   Pulse 88   Temp (!) 101 8 °F (38 8 °C) (Temporal)   Resp (!) 25   Wt 129 kg (284 lb 9 8 oz)   SpO2 98%     Physical Exam:   Physical Exam   Constitutional: No distress     Ill-appearing, obese, and edematous male lying in bed  HENT:   Head: Normocephalic and atraumatic  Right Ear: External ear normal    Left Ear: External ear normal    Nose: Nose normal    Mouth/Throat: Oropharynx is clear and moist  No oropharyngeal exudate  Patient is intubated   Eyes:   Myoclonic jerking of eyelids and eyeballs  No blink to threat and no corneal reflex  Cardiovascular: Normal rate and regular rhythm  Pulmonary/Chest:   On a ventilator, but slightly over breathing the vent   Abdominal: Soft  Bowel sounds are normal    Genitourinary:   Genitourinary Comments: Edematous scrotum   Musculoskeletal:   No movement in any extremity   Neurological:   Patient intubated, does not follow commands, and no withdrawal to noxious stimuli  See full neuro exam below   Skin: Skin is warm and dry  Capillary refill takes less than 2 seconds  He is not diaphoretic  Nursing note and vitals reviewed  Neurologic Exam     Mental Status   Patient intubated and unresponsive  Not currently on sedation  Unable to follow any commands  Cranial Nerves   Pupils 3 mm and unresponsive to light bilaterally  Myoclonic jerking of eyelids and eyeballs (multi directional), similar to yesterday's exam   May have slight corneal reflex on the left, but no corneal reflex on the right  Oculocephalic reflex absent  Minimal gag with deep suctioning, but normal gag reflex not intact  Facial expression appears symmetric     Motor Exam   Muscle bulk: normal  Overall muscle tone: normal  No spontaneous movement of UE or LE  Slight decerebrate posturing in bilateral UEs with noxious stimuli  No withdrawal to pain in any extremity  Sensory Exam   No withdrawal to noxious stimuli in any extremity  Gait, Coordination, and Reflexes     Gait  Gait: (deferred)    Tremor   Resting tremor: absent    Reflexes   Right ankle clonus: absent  Left ankle clonus: absent  Mute plantar reflex bilaterally  Decerebrate posturing when noxious stimuli is applied to bilateral UEs  Labs: I have personally reviewed pertinent reports     Recent Results (from the past 24 hour(s))   Fingerstick Glucose (POCT)    Collection Time: 01/23/20 12:37 PM   Result Value Ref Range    POC Glucose 86 65 - 140 mg/dl   Fingerstick Glucose (POCT)    Collection Time: 01/23/20  6:34 PM   Result Value Ref Range    POC Glucose 92 65 - 140 mg/dl   Fingerstick Glucose (POCT)    Collection Time: 01/23/20 11:58 PM   Result Value Ref Range    POC Glucose 106 65 - 140 mg/dl   Basic metabolic panel    Collection Time: 01/24/20  4:03 AM   Result Value Ref Range    Sodium 143 136 - 145 mmol/L    Potassium 4 2 3 5 - 5 3 mmol/L    Chloride 106 100 - 108 mmol/L    CO2 25 21 - 32 mmol/L    ANION GAP 12 4 - 13 mmol/L    BUN 60 (H) 5 - 25 mg/dL    Creatinine 3 15 (H) 0 60 - 1 30 mg/dL    Glucose 130 65 - 140 mg/dL    Calcium 7 3 (L) 8 3 - 10 1 mg/dL    eGFR 21 ml/min/1 73sq m   CBC and differential    Collection Time: 01/24/20  4:03 AM   Result Value Ref Range    WBC 7 58 4 31 - 10 16 Thousand/uL    RBC 2 45 (L) 3 88 - 5 62 Million/uL    Hemoglobin 7 4 (L) 12 0 - 17 0 g/dL    Hematocrit 24 1 (L) 36 5 - 49 3 %    MCV 98 82 - 98 fL    MCH 30 2 26 8 - 34 3 pg    MCHC 30 7 (L) 31 4 - 37 4 g/dL    RDW 16 1 (H) 11 6 - 15 1 %    MPV 10 9 8 9 - 12 7 fL    Platelets 580 642 - 888 Thousands/uL    nRBC 0 /100 WBCs    Neutrophils Relative 77 (H) 43 - 75 %    Immat GRANS % 1 0 - 2 %    Lymphocytes Relative 10 (L) 14 - 44 %    Monocytes Relative 12 4 - 12 %    Eosinophils Relative 0 0 - 6 %    Basophils Relative 0 0 - 1 %    Neutrophils Absolute 5 88 1 85 - 7 62 Thousands/µL    Immature Grans Absolute 0 04 0 00 - 0 20 Thousand/uL    Lymphocytes Absolute 0 75 0 60 - 4 47 Thousands/µL    Monocytes Absolute 0 90 0 17 - 1 22 Thousand/µL    Eosinophils Absolute 0 00 0 00 - 0 61 Thousand/µL    Basophils Absolute 0 01 0 00 - 0 10 Thousands/µL   Fingerstick Glucose (POCT)    Collection Time: 01/24/20  6:02 AM   Result Value Ref Range    POC Glucose 145 (H) 65 - 140 mg/dl       Imaging: I have personally reviewed pertinent imaging in PACS, including CT head,  and I have personally reviewed PACS reports  EKG, Pathology, and Other Studies: I have personally reviewed pertinent reports  VTE Prophylaxis: Sequential compression device (Venodyne)  and Heparin      Counseling / Coordination of Care  Total time spent today 20 minutes  Greater than 50% of total time was spent with the patient and/or family counseling and/or coordination of care  A description of the counseling/coordination of care:  Patient was seen and evaluated  Discussed with attending  Chart reviewed thoroughly including laboratory and imaging studies    Plan of care discussed with patient and primary team

## 2020-01-24 NOTE — PROGRESS NOTES
Progress Note Domenic Mediate 1960, 64 y o  male MRN: [id number]    Unit/Bed#: ICU 12 Encounter: [id number]    Primary Care Provider: No primary care provider on file  Date and time admitted to hospital: 1/21/2020 11:30 PM        * Cardiac arrest [facility])  Assessment & Plan  · The patient arrived as a transfer from [facility] after he was found down in a park and pulseless  Given his need for critical care management, he is admitted to the ICU   This will require greater then a 2 midnight stay, therefore the patient will be placed in inpatient status  · Troponin trend 0 06, 0 06, 0 07, 0 07  · Echo-EF-61%, mod hypokinesis of basal mid inferolateral walls, grade 2 diastolic dysfunction  · He had an initial temp of 92 and was asystolic, TTM deferred  · No neurologic recovery at this point    Acute respiratory failure (HCC)  Assessment & Plan  · Will continue vent management for now with a goal to maintain his oxygen saturations greater then 90%    Acute renal failure (HCC)  Assessment & Plan  · His baseline renal function is unknown, currently around 2 2  · We will monitor his renal indices and urine output closely  · Continue HD as per Nephrology recommendations    Hyperglycemia  Assessment & Plan  · It is unknown whether the patient has diabetes but his blood sugar was elevated on admission  · Insulin infusion has been discontinued and glucose levels have normalized    Hyperkalemia  Assessment & Plan  · Possibly from renal failure  · Will recheck his level and treat accordingly    Hypocalcemia  Assessment & Plan  · Will trend and replete as needed    Hypothermia  Assessment & Plan  · Resolved    Azotemia  Assessment & Plan  Hemodialysis    Encephalopathy acute  Assessment & Plan  · Possibly anoxic given unknown downtime  · There was also some evidence of myoclonus with spontaneous eye opening and muscle twitching   · EEG shows diffuse slowing, post-hypoxic myoclonus activity, no electrographic seizures  · Keppra discontinued    Anemia  Assessment & Plan  · The patient was found to have an initial hgb of 7 6, this dropped to 6 5 in the setting of fluid administration  · Will will transfuse now to keep his hgb > 7  · We will monitor for bleeding  · Baseline hgb is unknown    ----------------------------------------------------------------------------------------  HPI/24hr events: No events overnight  Disposition: Continue Critical Care   Code Status: Level 3 - DNAR and DNI  ---------------------------------------------------------------------------------------  SUBJECTIVE  N/A    Review of Systems  Review of systems was reviewed and negative unless stated above in HPI/24-hour events   ---------------------------------------------------------------------------------------  OBJECTIVE    Vitals   Vitals:    20 0400 20 0408 20 0512 20 0600   BP:       BP Location:       Pulse:  86 82    Resp:  16 21    Temp: 100 1 °F (37 8 °C)      TempSrc: Temporal      SpO2:  96% 96%    Weight:    129 kg (284 lb 9 8 oz)     Temp (24hrs), Av 3 °F (37 4 °C), Min:98 4 °F (36 9 °C), Max:100 1 °F (37 8 °C)  Current: Temperature: 100 1 °F (37 8 °C)  Arterial Line BP: 160/56  Arterial Line MAP (mmHg): 84 mmHg  SpO2: SpO2: 96 %       Physical Exam   Constitutional: He appears well-developed and well-nourished  No distress  Intubated, no purposeful interactions   HENT:   Head: Normocephalic and atraumatic  Right Ear: External ear normal    Left Ear: External ear normal    Nose: Nose normal    Mouth/Throat: Oropharynx is clear and moist    Eyes:   Periorbital edema  No pupillary reaction   Neck:   Large, edematous neck   Cardiovascular: Normal rate, regular rhythm, normal heart sounds and intact distal pulses  Exam reveals no gallop and no friction rub  No murmur heard  Pulmonary/Chest:   Lung sound course  ETT patent   Abdominal: Soft   Bowel sounds are normal    Musculoskeletal: He exhibits no deformity  anasarca   Neurological:   NR pupils  Inconsistent gag  Unable to assess corneals due to myoclonus   Skin: Skin is warm and dry  Capillary refill takes 2 to 3 seconds  Psychiatric:   Unable to assess   Nursing note and vitals reviewed        Invasive/non-invasive ventilation settings   Respiratory    Lab Data (Last 4 hours)    None         O2/Vent Data (Last 4 hours)      01/24 0322           Vent Mode AC/VC       Resp Rate (BPM) (BPM) 22       Vt (mL) (mL) 450       FIO2 (%) (%) 40       PEEP (cmH2O) (cmH2O) 5       MV 10 5                   Laboratory and Diagnostics:  Results from last 7 days   Lab Units 01/24/20  0403 01/23/20  0429 01/22/20 1948 01/22/20  1255 01/22/20  0455 01/21/20  2355 01/21/20  1648   WBC Thousand/uL 7 58 8 04  --   --  6 06 6 99 6 30   HEMOGLOBIN g/dL 7 4* 7 2* 7 2* 7 5* 6 9* 6 5* 7 6*   HEMATOCRIT % 24 1* 23 4*  --   --  22 9* 22 2* 26 4*   PLATELETS Thousands/uL 165 164  --   --  167 190 223   NEUTROS PCT % 77* 77*  --   --  83* 83*  --    MONOS PCT % 12 11  --   --  11 12  --    MONO PCT %  --   --   --   --   --   --  11*     Results from last 7 days   Lab Units 01/24/20  0403 01/23/20  0429 01/22/20 1948 01/22/20  1254 01/22/20  0455 01/21/20  2355 01/21/20  1648   SODIUM mmol/L 143 142 142 142 141 143 139   POTASSIUM mmol/L 4 2 5 1 5 3 6 5* 6 6* 6 9* 6 6*   CHLORIDE mmol/L 106 109* 109* 111* 113* 114* 113*   CO2 mmol/L 25 24 23 19* 18* 18* 12*   ANION GAP mmol/L 12 9 10 12 10 11 14   BUN mg/dL 60* 77 73* 92* 93* 86* 81*   CREATININE mg/dL 3 15* 3 23 2 92* 3 33* 3 07* 3 04* 3 04*   CALCIUM mg/dL 7 3* 7 0* 7 0* 7 2* 7 1* 6 9* 7 9*   GLUCOSE RANDOM mg/dL 130 110 112 106 47* 89 364*   ALT U/L  --   --   --   --   --  39 27   AST U/L  --   --   --   --   --  43 22   ALK PHOS U/L  --   --   --   --   --  129* 126*   ALBUMIN g/dL  --   --   --   --   --  2 8* 3 6   TOTAL BILIRUBIN mg/dL  --   --   --   --   --  0 33 0 30     Results from last 7 days   Lab Units 01/23/20  0429 01/22/20  0455 01/21/20  2355   MAGNESIUM mg/dL 1 9 2 1 2 1   PHOSPHORUS mg/dL 5 0*  --   --       Results from last 7 days   Lab Units 01/21/20  2355   INR  1 20*      Results from last 7 days   Lab Units 01/22/20  0455 01/22/20  0039 01/21/20  2355 01/21/20  1648   TROPONIN I ng/mL 0 07* 0 07* 0 06* 0 06*     Results from last 7 days   Lab Units 01/21/20  2355 01/21/20  1928   LACTIC ACID mmol/L 1 3 1 6     ABG:  Results from last 7 days   Lab Units 01/23/20  0429   PH ART  7 372   PCO2 ART mm Hg 37 0   PO2 ART mm Hg 88 9   HCO3 ART mmol/L 21 0*   BASE EXC ART mmol/L -3 8   ABG SOURCE  Line, Arterial     VBG:  Results from last 7 days   Lab Units 01/23/20  0429   ABG SOURCE  Line, Arterial     Results from last 7 days   Lab Units 01/21/20  2355   PROCALCITONIN ng/ml 0 23       Micro        EKG: NSR  Imaging: I have personally reviewed pertinent reports  Intake and Output  I/O       01/22 0701 - 01/23 0700 01/23 0701 - 01/24 0700    I V  (mL/kg) 2425 3 (18 4) 500 (3 8)    IV Piggyback 250 150    Total Intake(mL/kg) 2675 3 (20 3) 650 (4 9)    Urine (mL/kg/hr) 870 (0 3) 1125 (0 4)    Emesis/NG output 800 400    Other 1500 2003    Stool 0 0    Total Output 3170 3528    Net -494 7 -2878          Unmeasured Stool Occurrence 5 x 4 x          Height and Weights      IBW: -88 kg  There is no height or weight on file to calculate BMI    Weight (last 2 days)     Date/Time   Weight    01/24/20 0600   129 (284 61)    01/23/20 0559   132 (290 57)    01/22/20 0600   136 (299 38)                Nutrition       Diet Orders   (From admission, onward)             Start     Ordered    01/23/20 1704  Diet Enteral/Parenteral; Tube Feeding No Oral Diet; Nepro; Continuous; 50; Prosource Protein Liquid - One Packet  Diet effective now     Question Answer Comment   Diet Type Enteral/Parenteral    Enteral/Parenteral Tube Feeding No Oral Diet    Tube Feeding Formula: Nepro    Bolus/Cyclic/Continuous Continuous    Tube Feeding Goal Rate (mL/hr): 50    Prosource Protein Liquid - No Carb Prosource Protein Liquid - One Packet    RD to adjust diet per protocol? No        01/23/20 8300                  Active Medications  Scheduled Meds:    Current Facility-Administered Medications:  acetaminophen 650 mg Oral Q6H PRN JOURDAN Sánchez    chlorhexidine 15 mL Swish & Spit Q12H [address] Tavia [place] JOURDAN Jorgensen    heparin (porcine) 5,000 Units Subcutaneous [facility] JOURDAN Noble    LORazepam 2 mg Intravenous Q4H PRN JOURDAN Sánchez    pantoprazole 40 mg Intravenous Q24H [address] Nicole K JOURDAN Jorgensen    propofol 5-50 mcg/kg/min Intravenous Titrated JOURDAN Tejeda Last Rate: Stopped (01/23/20 0900)     Continuous Infusions:    propofol 5-50 mcg/kg/min Last Rate: Stopped (01/23/20 0900)     PRN Meds:     acetaminophen 650 mg Q6H PRN   LORazepam 2 mg Q4H PRN     ---------------------------------------------------------------------------------------  Advance Directive and Living Will:      Power of :    POLST:    ---------------------------------------------------------------------------------------  Counseling / Coordination of Care  Total Critical Care time spent 35 minutes excluding procedures, teaching and family updates  JOURDAN Tejeda      Portions of the record may have been created with voice recognition software  Occasional wrong word or "sound a like" substitutions may have occurred due to the inherent limitations of voice recognition software    Read the chart carefully and recognize, using context, where substitutions have occurred

## 2020-01-24 NOTE — ASSESSMENT & PLAN NOTE
· His baseline renal function is unknown, currently around 2 2  · We will monitor his renal indices and urine output closely  · Continue HD as per Nephrology recommendations

## 2020-01-24 NOTE — ASSESSMENT & PLAN NOTE
· Will continue vent management for now with a goal to maintain his oxygen saturations greater then 90%

## 2020-01-24 NOTE — ASSESSMENT & PLAN NOTE
· The patient arrived as a transfer from [facility] after he was found down in a park and pulseless  Given his need for critical care management, he is admitted to the ICU   This will require greater then a 2 midnight stay, therefore the patient will be placed in inpatient status  · Troponin trend 0 06, 0 06, 0 07, 0 07  · Echo-EF-61%, mod hypokinesis of basal mid inferolateral walls, grade 2 diastolic dysfunction  · He had an initial temp of 92 and was asystolic, TTM deferred  · No neurologic recovery at this point

## 2020-01-24 NOTE — ASSESSMENT & PLAN NOTE
· Possibly anoxic given unknown downtime  · There was also some evidence of myoclonus with spontaneous eye opening and muscle twitching   · EEG shows diffuse slowing, post-hypoxic myoclonus activity, no electrographic seizures  · Keppra discontinued

## 2020-01-24 NOTE — PLAN OF CARE
Problem: Prexisting or High Potential for Compromised Skin Integrity  Goal: Skin integrity is maintained or improved  Description  INTERVENTIONS:  - Identify patients at risk for skin breakdown  - Assess and monitor skin integrity  - Assess and monitor nutrition and hydration status  - Monitor labs   - Assess for incontinence   - Turn and reposition patient  - Assist with mobility/ambulation  - Relieve pressure over bony prominences  - Avoid friction and shearing  - Provide appropriate hygiene as needed including keeping skin clean and dry  - Evaluate need for skin moisturizer/barrier cream  - Collaborate with interdisciplinary team   - Patient/family teaching  - Consider wound care consult   Outcome: Progressing     Problem: PAIN - ADULT  Goal: Verbalizes/displays adequate comfort level or baseline comfort level  Description  Interventions:  - Encourage patient to monitor pain and request assistance  - Assess pain using appropriate pain scale  - Administer analgesics based on type and severity of pain and evaluate response  - Implement non-pharmacological measures as appropriate and evaluate response  - Consider cultural and social influences on pain and pain management  - Notify physician/advanced practitioner if interventions unsuccessful or patient reports new pain  Outcome: Progressing     Problem: INFECTION - ADULT  Goal: Absence or prevention of progression during hospitalization  Description  INTERVENTIONS:  - Assess and monitor for signs and symptoms of infection  - Monitor lab/diagnostic results  - Monitor all insertion sites, i e  indwelling lines, tubes, and drains  - Monitor endotracheal if appropriate and nasal secretions for changes in amount and color  - [place] appropriate cooling/warming therapies per order  - Administer medications as ordered  - Instruct and encourage patient and family to use good hand hygiene technique  - Identify and instruct in appropriate isolation precautions for identified infection/condition  Outcome: Progressing  Goal: Absence of fever/infection during neutropenic period  Description  INTERVENTIONS:  - Monitor WBC    Outcome: Progressing     Problem: SAFETY ADULT  Goal: Patient will remain free of falls  Description  INTERVENTIONS:  - Assess patient frequently for physical needs  -  Identify cognitive and physical deficits and behaviors that affect risk of falls  -  [place] fall precautions as indicated by assessment   - Educate patient/family on patient safety including physical limitations  - Instruct patient to call for assistance with activity based on assessment  - Modify environment to reduce risk of injury  - Consider OT/PT consult to assist with strengthening/mobility  Outcome: Progressing     Problem: Potential for Falls  Goal: Patient will remain free of falls  Description  INTERVENTIONS:  - Assess patient frequently for physical needs  -  Identify cognitive and physical deficits and behaviors that affect risk of falls    -  [place] fall precautions as indicated by assessment   - Educate patient/family on patient safety including physical limitations  - Instruct patient to call for assistance with activity based on assessment  - Modify environment to reduce risk of injury  - Consider OT/PT consult to assist with strengthening/mobility  Outcome: Progressing     Problem: METABOLIC, FLUID AND ELECTROLYTES - ADULT  Goal: Electrolytes maintained within normal limits  Description  INTERVENTIONS:  - Monitor labs and assess patient for signs and symptoms of electrolyte imbalances  - Administer electrolyte replacement as ordered  - Monitor response to electrolyte replacements, including repeat lab results as appropriate  - Instruct patient on fluid and nutrition as appropriate  Outcome: Progressing  Goal: Fluid balance maintained  Description  INTERVENTIONS:  - Monitor labs   - Monitor I/O and WT  - Instruct patient on fluid and nutrition as appropriate  - Assess for signs & symptoms of volume excess or deficit  Outcome: Progressing     Problem: NEUROSENSORY - ADULT  Goal: Remains free of injury related to seizures activity  Description  INTERVENTIONS  - Maintain airway, patient safety  and administer oxygen as ordered  - Monitor patient for seizure activity, document and report duration and description of seizure to physician/advanced practitioner  - If seizure occurs,  ensure patient safety during seizure  - Reorient patient post seizure  - Seizure pads on all 4 side rails  - Instruct patient/family to notify RN of any seizure activity including if an aura is experienced  - Instruct patient/family to call for assistance with activity based on nursing assessment  - Administer anti-seizure medications if ordered    Outcome: Progressing     Problem: RESPIRATORY - ADULT  Goal: Achieves optimal ventilation and oxygenation  Description  INTERVENTIONS:  - Assess for changes in respiratory status  - Assess for changes in mentation and behavior  - Position to facilitate oxygenation and minimize respiratory effort  - Oxygen administered by appropriate delivery if ordered  - Initiate smoking cessation education as indicated  - Encourage broncho-pulmonary hygiene including cough, deep breathe, Incentive Spirometry  - Assess the need for suctioning and aspirate as needed  - Assess and instruct to report SOB or any respiratory difficulty  - Respiratory Therapy support as indicated  Outcome: Progressing     Problem: Nutrition/Hydration-ADULT  Goal: Nutrient/Hydration intake appropriate for improving, restoring or maintaining nutritional needs  Description  Monitor and assess patient's nutrition/hydration status for malnutrition  Collaborate with interdisciplinary team and initiate plan and interventions as ordered  Monitor patient's weight and dietary intake as ordered or per policy  Utilize nutrition screening tool and intervene as necessary   Determine patient's food preferences and provide high-protein, high-caloric foods as appropriate       INTERVENTIONS:  - Monitor oral intake, urinary output, labs, and treatment plans  - Assess nutrition and hydration status and recommend course of action  - Evaluate amount of meals eaten  - Assist patient with eating if necessary   - Allow adequate time for meals  - Recommend/ encourage appropriate diets, oral nutritional supplements, and vitamin/mineral supplements  - Order, calculate, and assess calorie counts as needed  - Recommend, monitor, and adjust tube feedings and TPN/PPN based on assessed needs  - Assess need for intravenous fluids  - Provide specific nutrition/hydration education as appropriate  - Include patient/family/caregiver in decisions related to nutrition  Outcome: Progressing

## 2020-01-24 NOTE — SOCIAL WORK
Notified by ICU staff and [facility] that family may be interested in whole body donation due to financial hardships:     Called [place] [phone] and completed intake, the patient is eligible for whole body donation but is contingent upon:     · Must be on comfort cares at the time of passing  · Can not receive any blood products of high volumes of IV medications at the time of passing (does not include comfort medications, only life sustaining medications)  · [facility] is able to harvest organs and corneas but not skin and bones    CM met with the patients wife, Michael Huerta, alone with [facility] representative, Codi Parks - discussed all options  At this time, Michael Huerta would like to proceed with [facility] exploring the potential to harvest any organs for donation as long as it does not interfere with his ability to still qualify for whole body donation  Michael Huerta is aware that a file has been started for Spikebessie, she is to call [facility] and may do so prior to passing, they will need to email electronic consents to her and to his son, Louis Jacob, she is aware he will also need to consent to body donation and she will be in touch with him in regards to the donation plan  Any needs over the weekend, please call case management

## 2020-01-25 NOTE — PROGRESS NOTES
Progress Note Adeline Martinez 1960, 64 y o  male MRN: [id number]    Unit/Bed#: [place] [address] -01 Encounter: [id number]    Primary Care Provider: No primary care provider on file  Date and time admitted to hospital: 1/21/2020 11:30 PM        * Cardiac arrest [facility])  Assessment & Plan  · The patient arrived as a transfer from [facility] after he was found down in a park and pulseless  Given his need for critical care management, he is admitted to the ICU  This will require greater then a 2 midnight stay, therefore the patient will be placed in inpatient status  · Troponin trend 0 06, 0 06, 0 07, 0 07  · Echo-EF-61%, mod hypokinesis of basal mid inferolateral walls, grade 2 diastolic dysfunction  · He had an initial temp of 92 and was asystolic, TTM deferred  · Given lack of neurologic recovery the family elected to transition the pt to Comfort Measures only yesterday evening  Continue morphine gtt for comfort  Acute respiratory failure ([address] )  Assessment & Plan  · Pt was terminally extubated yesterday after transition to comfort care  Encephalopathy acute  Assessment & Plan  · Possibly anoxic given unknown downtime  · There was also some evidence of myoclonus with spontaneous eye opening and muscle twitching   · EEG shows diffuse slowing, post-hypoxic myoclonus activity, no electrographic seizures  · Keppra discontinued  · Severe and ongoing encephalopathy, no signs of neurologic recovery    _____________________________________________________________________    HPI/24hr events:   Pt was transitioned to [address] yesterday evening and terminally extubated per family wishes    Medications:    Current Facility-Administered Medications:  acetaminophen 650 mg Oral Q6H PRN Rozella Childes Spatzer, CRNP    morphine 5 mg/hr Intravenous Continuous Rozella Childes Spatzer, CRNP Last Rate: 5 mg/hr (01/24/20 1832)         morphine 5 mg/hr Last Rate: 5 mg/hr (01/24/20 1832)         Physical exam:  Vitals:    There is no height or weight on file to calculate BMI  Blood pressure (!) 186/64, pulse 90, temperature (!) 101 3 °F (38 5 °C), temperature source Temporal, resp  rate (!) 7, weight 129 kg (284 lb 9 8 oz), SpO2 (!) 87 %  ,  Temp  Min: 91 8 °F (33 2 °C)  Max: 101 8 °F (38 8 °C)  IBW: -88 kg    SpO2: (!) 87 %  SpO2 Activity: At Rest  O2 Device: Ventilator      Intake/Output Summary (Last 24 hours) at 1/25/2020 1745  Last data filed at 1/24/2020 1628  Gross per 24 hour   Intake 355 ml   Output 770 ml   Net -415 ml       Invasive/non-invasive ventilation settings:   Respiratory    Lab Data (Last 4 hours)    None         O2/Vent Data (Last 4 hours)    None              Invasive Devices     Central Venous Catheter Line            CVC Central Lines 01/22/20 Double 2 days          Peripheral Intravenous Line            Peripheral IV 01/21/20 Left Forearm 3 days    Peripheral IV 01/21/20 Right Antecubital 3 days          Drain            Urethral Catheter Double-lumen 3 days    Rectal Tube With balloon 1 day                  Physical Exam:  Gen: Unresponsive  HEENT: atraumatic, normocephalic, pupils 3mm equal and nonreactive, no nystagmus  Neck: supple, trachea midline, no JVD, no lymphadenopathy  Chest: lungs coarse  Cor: single s1/s2, no m/r/g, RRR  Abd: soft, nondistended, no guarding, BS hypoactive  Ext: generalized extremity edema, no clubbing or cyanosis  Neuro: no spontaneous movement, no response to noxious stimuli  Skin: warm, dry      Diagnostic Data:  Lab: I have personally reviewed pertinent lab results  CBC:   Results from last 7 days   Lab Units 01/24/20  0403 01/23/20  0429 01/22/20  1948  01/22/20  0455   WBC Thousand/uL 7 58 8 04  --   --  6 06   HEMOGLOBIN g/dL 7 4* 7 2* 7 2*   < > 6 9*   HEMATOCRIT % 24 1* 23 4*  --   --  22 9*   PLATELETS Thousands/uL 165 164  --   --  167    < > = values in this interval not displayed         CMP:   Results from last 7 days   Lab Units 01/24/20  1540 01/24/20  0403 01/23/20  0429 01/21/20  2355 01/21/20  1648   SODIUM mmol/L 143 143 142   < > 143 139   POTASSIUM mmol/L 4 0 4 2 5 1   < > 6 9* 6 6*   CHLORIDE mmol/L 108 106 109*   < > 114* 113*   CO2 mmol/L 27 25 24   < > 18* 12*   BUN mg/dL 67* 60* 77   < > 86* 81*   CREATININE mg/dL 3 62* 3 15* 3 23   < > 3 04* 3 04*   CALCIUM mg/dL 7 1* 7 3* 7 0*   < > 6 9* 7 9*   ALK PHOS U/L 104  --   --   --  129* 126*   ALT U/L 20  --   --   --  39 27   AST U/L 47*  --   --   --  43 22    < > = values in this interval not displayed  PT/INR:   No results found for: PT, INR,   Magnesium:   Results from last 7 days   Lab Units 01/23/20  0429 01/22/20  0455 01/21/20  2355   MAGNESIUM mg/dL 1 9 2 1 2 1     Phosphorous:   Results from last 7 days   Lab Units 01/23/20  0429   PHOSPHORUS mg/dL 5 0*       Microbiology:        Imaging:  No new imaging    Cardiac lab/EKG/telemetry/ECHO:   Sinus rhythm    VTE Prophylaxis:     Code Status: Level 4 - [address] [place] [address]    Portions of the record may have been created with voice recognition software  Occasional wrong word or "sound a like" substitutions may have occurred due to the inherent limitations of voice recognition software  Read the chart carefully and recognize, using context, where substitutions have occurred

## 2020-01-25 NOTE — PROGRESS NOTES
Following for acute kidney injury with probable ATN in the setting of cardiac arrest   Patient was initiated on dialysis 1/22 secondary to hyperkalemia as well as oliguria  Per critical care team and critical care note, patient is now comfort measures only  Will sign off from Renal  Thank you

## 2020-01-25 NOTE — NURSING NOTE
Report given, pt transferred to [facility] 2-219  Wife following with pts belongings

## 2020-01-25 NOTE — ASSESSMENT & PLAN NOTE
· The patient arrived as a transfer from [facility] after he was found down in a park and pulseless  Given his need for critical care management, he is admitted to the ICU  This will require greater then a 2 midnight stay, therefore the patient will be placed in inpatient status  · Troponin trend 0 06, 0 06, 0 07, 0 07  · Echo-EF-61%, mod hypokinesis of basal mid inferolateral walls, grade 2 diastolic dysfunction  · He had an initial temp of 92 and was asystolic, TTM deferred  · Given lack of neurologic recovery the family elected to transition the pt to Comfort Measures only yesterday evening  Continue morphine gtt for comfort  [General Appearance - In No Acute Distress] : no acute distress [] : no respiratory distress [Abdomen Hernia] : no hernia was discovered [Scrotum] : the scrotum was normal [Epididymis] : the epididymides were normal [Testes Tenderness] : no tenderness of the testes [Testes Mass (___cm)] : there were no testicular masses [Oriented To Time, Place, And Person] : oriented to person, place, and time

## 2020-01-25 NOTE — ASSESSMENT & PLAN NOTE
· Possibly anoxic given unknown downtime  · There was also some evidence of myoclonus with spontaneous eye opening and muscle twitching   · EEG shows diffuse slowing, post-hypoxic myoclonus activity, no electrographic seizures  · Keppra discontinued  · Severe and ongoing encephalopathy, no signs of neurologic recovery

## 2020-01-26 NOTE — DISCHARGE SUMMARY
Discharge Summary - Elizabeth Mcadams 64 y o  male MRN: [id number]    Unit/Bed#: ICU 12 Encounter: [id number] PCP: No primary care provider on file  Admission Date:   Admission Orders (From admission, onward)     Ordered        20 2343  Inpatient Admission  Once                     Admitting Diagnosis: Cardiac arrest [facility]) [I46 9]    HPI: 64year-old male who presented as a transfer from [facility] after he was found in a park down by bystanders  EMS was called and the patient was found to be asystolic and in cardiac arrest   CPR was initiated and the patient received several rounds of ACLS medications  On his arrival to the emergency department and at [facility] he was found to have return of spontaneous circulation  The patient was noted to have an initial temperature of 92°  Additionally he was found to have hyperkalemia and evidence of renal failure  In addition the patient was found to be hyperglycemic  [facility] [place] [facility] [place] for higher level of care  Procedures Performed:   Orders Placed This Encounter   Procedures    Central Line       Summary of Hospital Course: After admission to [address] ICU the patient never regained consciousness  He underwent hemodialysis with improvement of his hyperkalemia and renal function  Patient was also seen by Neurology who estimated the patient's prognosis for recovery was fairly Grim  The patient's wife monitored his condition and on 2020 elected to terminally extubate and withdraw care in favor of pursuing comfort as the ultimate goal to allow a natural death      Significant Findings, Care, Treatment and Services Provided: Intubation, mechanical ventilation, hemodialysis    Complications: As noted in the hospital course    Disposition:      Final Diagnosis: Cardiac arrest, acute respiratory failure, anoxic encephalopathy, acute renal failure    Resolved Problems  Date Reviewed: 1/25/2020          Resolved    Metabolic acidosis 6/71/8789     Resolved by  Dana Rivera DO    Hypocalcemia 1/24/2020     Resolved by  Dana Rivera DO          Condition at Time of Death: [address]

## 2020-01-26 NOTE — SOCIAL WORK
W/E CM called [facility] () in regards to the eligibility of pt's remains after texting assigned ICU CM; she request I call [facility] to confirm that pt was not eleigilble to donate his remains  They reported to  that pt IS indeed eligible, they are just waiting on the verbal consent of the wife and son, Mike Nolasco, over the phone  Called wife, Mickey Villarreal, and reported this to her providing her the number to call and do so  She stated she would text the number to his son as well  CM suggested she reach out to the assigned CM tomorrow for further instructions      Ole GARY Mello  1/26/2020  5118

## 2020-01-26 NOTE — PROGRESS NOTES
Progress Note Teri Paez 1960, 64 y o  male MRN: [id number]    Unit/Bed#: ICU 12 Encounter: [id number]    Primary Care Provider: No primary care provider on file  Date and time admitted to hospital: 1/21/2020 11:30 PM        * Cardiac arrest [facility])  Assessment & Plan  · The patient arrived as a transfer from [facility] after he was found down in a park and pulseless  Given his need for critical care management, he is admitted to the ICU  This will require greater then a 2 midnight stay, therefore the patient will be placed in inpatient status  · Troponin trend 0 06, 0 06, 0 07, 0 07  · Echo-EF-61%, mod hypokinesis of basal mid inferolateral walls, grade 2 diastolic dysfunction  · He had an initial temp of 92 and was asystolic, TTM deferred  · Given lack of neurologic recovery the family elected to transition the pt to Comfort Measures only  Continue morphine gtt for comfort  Acute respiratory failure ([address] )  Assessment & Plan  · Pt was terminally extubated after transition to comfort care  Encephalopathy acute  Assessment & Plan  · Possibly anoxic given unknown downtime  · There was also some evidence of myoclonus with spontaneous eye opening and muscle twitching   · EEG shows diffuse slowing, post-hypoxic myoclonus activity, no electrographic seizures  · Keppra discontinued  · Severe and ongoing encephalopathy, no signs of neurologic recovery      _____________________________________________________________________    HPI/24hr events:   No acute events overnight  Remains unresponsive  Medications:    Current Facility-Administered Medications:  acetaminophen 650 mg Oral Q6H PRN Marcine Rosyy Spatzer, CRNP    morphine 5 mg/hr Intravenous Continuous Marcine Rosy Spatzer, CRNP Last Rate: 5 mg/hr (01/26/20 0501)         morphine 5 mg/hr Last Rate: 5 mg/hr (01/26/20 0501)         Physical exam:  Vitals: There is no height or weight on file to calculate BMI    Blood pressure (!) 186/64, pulse 80, temperature (!) 101 3 °F (38 5 °C), temperature source Temporal, resp  rate (!) 2, weight 129 kg (284 lb 9 8 oz), SpO2 (!) 87 %  ,  Temp  Min: 91 8 °F (33 2 °C)  Max: 101 8 °F (38 8 °C)  IBW: -88 kg    SpO2: (!) 87 %  SpO2 Activity: At Rest  O2 Device: Ventilator      Intake/Output Summary (Last 24 hours) at 1/26/2020 2311  Last data filed at 1/26/2020 0501  Gross per 24 hour   Intake 34 49 ml   Output 875 ml   Net -840 51 ml       Invasive/non-invasive ventilation settings:   Respiratory    Lab Data (Last 4 hours)    None         O2/Vent Data (Last 4 hours)    None              Invasive Devices     Central Venous Catheter Line            CVC Central Lines 01/22/20 Double 3 days          Drain            Urethral Catheter Double-lumen 4 days                  Physical Exam:  Gen:  Unresponsive  HEENT:  Atraumatic, normocephalic, pupils 4 mm equal and nonreactive no nystagmus  Neck:  Supple, trachea midline, no JVD, no lymphadenopathy  Chest:  Coarse bilaterally  Cor:  Single S1/S2, no murmurs, rubs, gallops, regular rate and rhythm  Abd:  Soft, nondistended, no guarding, bowel sounds hypoactive  Ext:  Generalized edema of all 4 extremities, no clubbing or cyanosis  Neuro:  No spontaneous movement, no response to any noxious stimuli  Skin:  Warm, dry      Diagnostic Data:  Lab: I have personally reviewed pertinent lab results  CBC:   Results from last 7 days   Lab Units 01/24/20  0403 01/23/20  0429 01/22/20  1948  01/22/20  0455   WBC Thousand/uL 7 58 8 04  --   --  6 06   HEMOGLOBIN g/dL 7 4* 7 2* 7 2*   < > 6 9*   HEMATOCRIT % 24 1* 23 4*  --   --  22 9*   PLATELETS Thousands/uL 165 164  --   --  167    < > = values in this interval not displayed         CMP:   Results from last 7 days   Lab Units 01/24/20  1540 01/24/20  0403 01/23/20  0429  01/21/20  2355 01/21/20  1648   SODIUM mmol/L 143 143 142   < > 143 139   POTASSIUM mmol/L 4 0 4 2 5 1   < > 6 9* 6 6*   CHLORIDE mmol/L 108 106 109*   < > 114* 113*   CO2 mmol/L 27 25 24   < > 18* 12*   BUN mg/dL 67* 60* 77   < > 86* 81*   CREATININE mg/dL 3 62* 3 15* 3 23   < > 3 04* 3 04*   CALCIUM mg/dL 7 1* 7 3* 7 0*   < > 6 9* 7 9*   ALK PHOS U/L 104  --   --   --  129* 126*   ALT U/L 20  --   --   --  39 27   AST U/L 47*  --   --   --  43 22    < > = values in this interval not displayed  PT/INR:   No results found for: PT, INR,   Magnesium:   Results from last 7 days   Lab Units 01/23/20  0429 01/22/20  0455 01/21/20  2355   MAGNESIUM mg/dL 1 9 2 1 2 1     Phosphorous:   Results from last 7 days   Lab Units 01/23/20  0429   PHOSPHORUS mg/dL 5 0*       Microbiology:        Imaging:  No new imaging    Cardiac lab/EKG/telemetry/ECHO:   Sinus rhythm    VTE Prophylaxis:     Code Status: Level 4 - [address] [place] [address]    Portions of the record may have been created with voice recognition software  Occasional wrong word or "sound a like" substitutions may have occurred due to the inherent limitations of voice recognition software  Read the chart carefully and recognize, using context, where substitutions have occurred

## 2020-01-26 NOTE — ASSESSMENT & PLAN NOTE
· The patient arrived as a transfer from [facility] after he was found down in a park and pulseless  Given his need for critical care management, he is admitted to the ICU  This will require greater then a 2 midnight stay, therefore the patient will be placed in inpatient status  · Troponin trend 0 06, 0 06, 0 07, 0 07  · Echo-EF-61%, mod hypokinesis of basal mid inferolateral walls, grade 2 diastolic dysfunction  · He had an initial temp of 92 and was asystolic, TTM deferred  · Given lack of neurologic recovery the family elected to transition the pt to Comfort Measures only  Continue morphine gtt for comfort

## 2020-01-27 NOTE — UTILIZATION REVIEW
Continued Stay Review    Date:      FOR  1/26                       Current Patient Class:   Inpatient  Current Level of Care:    Med surg    HPI:59 y o  male initially admitted on    1/21  After cardiac  arrest     Assessment/Plan:   1/26  Unknown  Etiology of cardiac arrest   Remains  In  ICU       Terminally  Extubated    1/24  @   1800                Cont  Morphine drip    Pertinent Labs/Diagnostic Results:   Results from last 7 days   Lab Units 01/24/20  0403 01/23/20  0429 01/22/20 1948 01/22/20  1255 01/22/20  0455 01/21/20  2355  01/21/20  1648   WBC Thousand/uL 7 58 8 04  --   --  6 06 6 99  --  6 30   HEMOGLOBIN g/dL 7 4* 7 2* 7 2* 7 5* 6 9* 6 5*  --  7 6*   HEMATOCRIT % 24 1* 23 4*  --   --  22 9* 22 2*  --  26 4*   PLATELETS Thousands/uL 165 164  --   --  167 190  --  223   NEUTROS ABS Thousands/µL 5 88 6 14  --   --  4 99 5 77   < >  --    TOTAL NEUT ABS Thousand/uL  --   --   --   --   --   --   --  4 16    < > = values in this interval not displayed           Results from last 7 days   Lab Units 01/24/20  1540 01/24/20  0403 01/23/20  0429 01/22/20 1948 01/22/20  1254 01/22/20  0455 01/21/20  2355   SODIUM mmol/L 143 143 142 142 142 141 143   POTASSIUM mmol/L 4 0 4 2 5 1 5 3 6 5* 6 6* 6 9*   CHLORIDE mmol/L 108 106 109* 109* 111* 113* 114*   CO2 mmol/L 27 25 24 23 19* 18* 18*   ANION GAP mmol/L 8 12 9 10 12 10 11   BUN mg/dL 67* 60* 77 73* 92* 93* 86*   CREATININE mg/dL 3 62* 3 15* 3 23 2 92* 3 33* 3 07* 3 04*   EGFR ml/min/1 73sq m 18 21 10 12 10 11 11   CALCIUM mg/dL 7 1* 7 3* 7 0* 7 0* 7 2* 7 1* 6 9*   CALCIUM, IONIZED mmol/L  --   --   --   --   --  1 02* 1 04*   MAGNESIUM mg/dL  --   --  1 9  --   --  2 1 2 1   PHOSPHORUS mg/dL  --   --  5 0*  --   --   --   --      Results from last 7 days   Lab Units 01/24/20  1540 01/21/20  2355 01/21/20  1648   AST U/L 47* 43 22   ALT U/L 20 39 27   ALK PHOS U/L 104 129* 126*   TOTAL PROTEIN g/dL 5 2* 6 0* 6 5   ALBUMIN g/dL 2 1* 2 8* 3 6   TOTAL BILIRUBIN mg/dL 0 35 0 33 0 30     Results from last 7 days   Lab Units 01/24/20  1232 01/24/20  0602 01/23/20  2358 01/23/20  1834 01/23/20  1237 01/23/20  0601 01/23/20  0027 01/22/20  1950 01/22/20  1136 01/22/20  0811   POC GLUCOSE mg/dl 184* 145* 106 92 86 105 124 104 102 78     Results from last 7 days   Lab Units 01/24/20  1540 01/24/20  0403 01/23/20  0429 01/22/20  1948 01/22/20  1254 01/22/20  0455 01/21/20  2355 01/21/20  1648   GLUCOSE RANDOM mg/dL 179* 130 110 112 106 47* 89 364*          Results from last 7 days   Lab Units 01/24/20  1621 01/23/20  0429 01/22/20  1836   PH ART  7 457* 7 372 7 349*   PCO2 ART mm Hg 35 4* 37 0 39 9   PO2 ART mm Hg 334 2* 88 9 125 9   HCO3 ART mmol/L 24 4 21 0* 21 5*   BASE EXC ART mmol/L 0 8 -3 8 -3 8   O2 CONTENT ART mL/dL 14 6* 10 7* 11 5*   O2 HGB, ARTERIAL % 98 7* 95 2 97 4*   ABG SOURCE  Line, Arterial Line, Arterial Line, Arterial             Results from last 7 days   Lab Units 01/22/20  0455   CK TOTAL U/L 418*   CK MB INDEX % 3 1*   CK MB ng/mL 12 9*     Results from last 7 days   Lab Units 01/22/20  0455 01/22/20  0039 01/21/20  2355 01/21/20  1648   TROPONIN I ng/mL 0 07* 0 07* 0 06* 0 06*         Results from last 7 days   Lab Units 01/24/20  1148 01/21/20  2355   PROCALCITONIN ng/ml 0 42* 0 23     Results from last 7 days   Lab Units 01/21/20  2355 01/21/20  1928   LACTIC ACID mmol/L 1 3 1 6           Vital Signs:   01/26/20 2000    60  5Abnormal          01/26/20 0400    80                 Medications:   Scheduled Medications:     Continuous IV Infusions:    morphine 5 mg/hr Intravenous Continuous     PRN Meds:    acetaminophen 650 mg Oral Q6H PRN       Discharge Plan:    TBD    Network Utilization Review Department  [email or web address]  ATTENTION: Please call with any questions or concerns to [phone] and carefully listen to the prompts so that you are directed to the right person   All voicemails are confidential   Katelin Gusman all requests for admission clinical reviews, approved or denied determinations and any other requests to dedicated fax number below belonging to the campus where the patient is receiving treatment   List of dedicated fax numbers for the Facilities:  [address] [place] [address] DENIALS (Administrative/Medical Necessity) [phone]   [address]  (Maternity/NICU/Pediatrics) [phone]   Yeimi Carter [phone]   Ann Patel [phone]   Gilda Elder [phone]   Jeff Vela [phone]   [address] [place] [address] [phone]   [facility]  [phone]   [address] [facility] [address]  [address] [address] [address] [facility] [phone]

## 2020-01-27 NOTE — ASSESSMENT & PLAN NOTE
· The patient arrived as a transfer from [address] [place] [address] after he was found down in a park and pulseless  Given his need for critical care management, he is admitted to the ICU  This will require greater then a 2 midnight stay, therefore the patient will be placed in inpatient status  · Troponin trend 0 06, 0 06, 0 07, 0 07  · Echo-EF-61%, mod hypokinesis of basal mid inferolateral walls, grade 2 diastolic dysfunction  · He had an initial temp of 92 and was asystolic, TTM deferred  · Given lack of neurologic recovery the family elected to transition the pt to Comfort Measures only  Continue morphine gtt for comfort

## 2020-01-27 NOTE — PROGRESS NOTES
Progress Note Keisha Guzman 1960, 64 y o  male MRN: [id number]    Unit/Bed#: ICU 09 Encounter: [id number]    Primary Care Provider: No primary care provider on file  Date and time admitted to hospital: 1/21/2020 11:30 PM        * Cardiac arrest [facility])  Assessment & Plan  · The patient arrived as a transfer from [facility] after he was found down in a park and pulseless  Given his need for critical care management, he is admitted to the ICU  This will require greater then a 2 midnight stay, therefore the patient will be placed in inpatient status  · Troponin trend 0 06, 0 06, 0 07, 0 07  · Echo-EF-61%, mod hypokinesis of basal mid inferolateral walls, grade 2 diastolic dysfunction  · He had an initial temp of 92 and was asystolic, TTM deferred  · Given lack of neurologic recovery the family elected to transition the pt to Comfort Measures only  Continue morphine gtt for comfort  Acute respiratory failure ([address] )  Assessment & Plan  · Pt was terminally extubated after transition to comfort care      Hyperglycemia  Assessment & Plan  · It is unknown whether the patient has diabetes but his blood sugar was elevated on admission  · Insulin infusion has been discontinued and glucose levels have normalized    Hypothermia  Assessment & Plan  · Resolved    Azotemia  Assessment & Plan  · Hemodialysis, no off given family wishes for hospice/comfort care    Encephalopathy acute  Assessment & Plan  · Possibly anoxic given unknown downtime  · There was also some evidence of myoclonus with spontaneous eye opening and muscle twitching   · EEG shows diffuse slowing, post-hypoxic myoclonus activity, no electrographic seizures  · Keppra discontinued  · Severe and ongoing encephalopathy, no signs of neurologic recovery      ----------------------------------------------------------------------------------------  HPI/24hr events: no events overnight, remains essentially unrepsonsive    Disposition: Transfer to [facility] Code Status: Level 4 - Comfort Care  ---------------------------------------------------------------------------------------  SUBJECTIVE  Unreponsive    Review of Systems  Review of systems was unable to be performed secondary to mental status  ---------------------------------------------------------------------------------------  OBJECTIVE    Vitals   Vitals:    01/25/20 1745 01/25/20 2200 01/26/20 0400 01/26/20 2000   BP:       BP Location:       Pulse: 86 84 80 60   Resp: (!) 11 (!) 2  (!) 5   Temp:       TempSrc:       SpO2:       Weight:         No data recorded  Current: Temperature: (!) 101 3 °F (38 5 °C)  Arterial Line BP: 154/62  Arterial Line MAP (mmHg): 90 mmHg  SpO2 Device: O2 Device: Ventilator       Physical Exam   Constitutional: He appears well-developed and well-nourished  HENT:   Head: Normocephalic  Eyes:   Pupils are minimally reactive   Neck: Neck supple  Cardiovascular: Bradycardia present  Pulmonary/Chest: He has rales  Abdominal: Soft  Bowel sounds are normal  He exhibits no distension  There is tenderness  Musculoskeletal: He exhibits edema  Lymphadenopathy:     He has no cervical adenopathy  Neurological:   Essentially unrepsonsive   Skin: Skin is warm and dry         Invasive/non-invasive ventilation settings   Respiratory    Lab Data (Last 4 hours)    None         O2/Vent Data (Last 4 hours)    None                Laboratory and Diagnostics:  Results from last 7 days   Lab Units 01/24/20  0403 01/23/20  0429 01/22/20  1948 01/22/20  1255 01/22/20  0455 01/21/20  2355 01/21/20  1648   WBC Thousand/uL 7 58 8 04  --   --  6 06 6 99 6 30   HEMOGLOBIN g/dL 7 4* 7 2* 7 2* 7 5* 6 9* 6 5* 7 6*   HEMATOCRIT % 24 1* 23 4*  --   --  22 9* 22 2* 26 4*   PLATELETS Thousands/uL 165 164  --   --  167 190 223   NEUTROS PCT % 77* 77*  --   --  83* 83*  --    MONOS PCT % 12 11  --   --  11 12  --    MONO PCT %  --   --   --   --   --   --  11*     Results from last 7 days   Lab Units 01/24/20  1540 01/24/20  0403 01/23/20  0429 01/22/20  1948 01/22/20  1254 01/22/20  0455 01/21/20  2355 01/21/20  1648   SODIUM mmol/L 143 143 142 142 142 141 143 139   POTASSIUM mmol/L 4 0 4 2 5 1 5 3 6 5* 6 6* 6 9* 6 6*   CHLORIDE mmol/L 108 106 109* 109* 111* 113* 114* 113*   CO2 mmol/L 27 25 24 23 19* 18* 18* 12*   ANION GAP mmol/L 8 12 9 10 12 10 11 14   BUN mg/dL 67* 60* 77 73* 92* 93* 86* 81*   CREATININE mg/dL 3 62* 3 15* 3 23 2 92* 3 33* 3 07* 3 04* 3 04*   CALCIUM mg/dL 7 1* 7 3* 7 0* 7 0* 7 2* 7 1* 6 9* 7 9*   GLUCOSE RANDOM mg/dL 179* 130 110 112 106 47* 89 364*   ALT U/L 20  --   --   --   --   --  39 27   AST U/L 47*  --   --   --   --   --  43 22   ALK PHOS U/L 104  --   --   --   --   --  129* 126*   ALBUMIN g/dL 2 1*  --   --   --   --   --  2 8* 3 6   TOTAL BILIRUBIN mg/dL 0 35  --   --   --   --   --  0 33 0 30     Results from last 7 days   Lab Units 01/23/20  0429 01/22/20  0455 01/21/20  2355   MAGNESIUM mg/dL 1 9 2 1 2 1   PHOSPHORUS mg/dL 5 0*  --   --       Results from last 7 days   Lab Units 01/21/20  2355   INR  1 20*      Results from last 7 days   Lab Units 01/22/20  0455 01/22/20  0039 01/21/20  2355 01/21/20  1648   TROPONIN I ng/mL 0 07* 0 07* 0 06* 0 06*     Results from last 7 days   Lab Units 01/21/20  2355 01/21/20  1928   LACTIC ACID mmol/L 1 3 1 6     ABG:  Results from last 7 days   Lab Units 01/24/20  1621   PH ART  7 457*   PCO2 ART mm Hg 35 4*   PO2 ART mm Hg 334 2*   HCO3 ART mmol/L 24 4   BASE EXC ART mmol/L 0 8   ABG SOURCE  Line, Arterial     VBG:  Results from last 7 days   Lab Units 01/24/20  1621   ABG SOURCE  Line, Arterial     Results from last 7 days   Lab Units 01/24/20  1148 01/21/20  2355   PROCALCITONIN ng/ml 0 42* 0 23       Micro        EKG:   Imaging: I have personally reviewed pertinent reports     and I have personally reviewed pertinent films in PACS    Intake and Output  I/O       01/25 0701 - 01/26 0700 01/26 0701 - 01/27 0700    I V  (mL/kg) 34 5 (0 3) 15 (0 1)    Total Intake(mL/kg) 34 5 (0 3) 15 (0 1)    Urine (mL/kg/hr) 875 (0 3) 5 (0)    Total Output 875 5    Net -840 5 +10                Height and Weights      IBW: -88 kg  There is no height or weight on file to calculate BMI  Weight (last 2 days)     None            Nutrition        Active Medications  Scheduled Meds:  Current Facility-Administered Medications:  acetaminophen 650 mg Oral Q6H PRN Michael Palma Spatzer, CRNP    morphine 5 mg/hr Intravenous Continuous Michael Choe Spatzer, CRNP Last Rate: 5 mg/hr (01/26/20 2001)     Continuous Infusions:    morphine 5 mg/hr Last Rate: 5 mg/hr (01/26/20 2001)     PRN Meds:     acetaminophen 650 mg Q6H PRN     ---------------------------------------------------------------------------------------  Advance Directive and Living Will:      Power of :    POLST:    ---------------------------------------------------------------------------------------  Counseling / Coordination of Torri Mercer 16, CRNP      Portions of the record may have been created with voice recognition software  Occasional wrong word or "sound a like" substitutions may have occurred due to the inherent limitations of voice recognition software    Read the chart carefully and recognize, using context, where substitutions have occurred

## 2020-01-27 NOTE — SOCIAL WORK
Consult for "hospice via case management" CM sent a hospice referral to [facility] for IPH, however, now the medical team is saying pt not stable for transport  Referral cancelled

## 2020-01-28 NOTE — PROGRESS NOTES
Transfer Note - ICU/Stepdown Transfer to [facility]/MS tele   Bryan [facility] 64 y o  male MRN: [id number]  [address] [facility]   Unit/Bed#: ICU 09 Encounter: [id number]    Code Status: Level 4 - Comfort Care    Reason for ICU/Stepdown admission: s/p cardiac arrest    Active problems:     * Cardiac arrest [facility])  Assessment & Plan  · The patient arrived as a transfer from [facility] on 1/25 after he was found down in a park and pulseless  Given his need for critical care management, he is admitted to the ICU  This will require greater then a 2 midnight stay, therefore the patient will be placed in inpatient status  · Troponin trend 0 06, 0 06, 0 07, 0 07  · Echo-EF-61%, mod hypokinesis of basal mid inferolateral walls, grade 2 diastolic dysfunction  · He had an initial temp of 92 and was asystolic, TTM deferred  · Given lack of neurologic recovery the family elected to transition the pt to Comfort Measures only  ·  Continue morphine gtt for comfort  Acute respiratory failure ([facility] [address] )  Assessment & Plan  · Pt was terminally extubated after transition to comfort care      Hyperglycemia  Assessment & Plan  · It is unknown whether the patient has diabetes but his blood sugar was elevated on admission  · Insulin infusion has been discontinued and glucose levels have normalized    Hypothermia  Assessment & Plan  · Resolved    Azotemia  Assessment & Plan  · Hemodialysis, now off given family wishes for hospice/comfort care    Encephalopathy acute  Assessment & Plan  · Possibly anoxic given unknown downtime  · There was also some evidence of myoclonus with spontaneous eye opening and muscle twitching   · EEG shows diffuse slowing, post-hypoxic myoclonus activity, no electrographic seizures  · Keppra discontinued  · Severe and ongoing encephalopathy, no signs of neurologic recovery        Consultants:   · None - comfort care     History of Present Illness/Summary of clinical course: Patient is 63 yo male who presented originally to [address] [place] [address] ER after [place] the park on 1[place]21  On arrival to [address] [place] [address] was was asystole and CPR was started with return of ROSC  He was intubated and transferred for [facility] ICU for further workup  TTM was deferred on arrival   Patient remained intubated and unresponsive and an EEG was done which showed diffuse slowing and post hypoxic myoclonus activity with no signs of neurologic recovery  It was decided for patient to be placed on comfort care  Patient was terminally extubated on 1/24 and a morphine infusion was initiated  Patient remains DNR/DNI on comfort care  Please refer to today's progress note for further clinical details  Recent or scheduled procedures: extubation    Outstanding/pending diagnostics: none       Mobilization Plan: bedrests    Nutrition Plan: NPO      [  ] Family aware of transfer out of critical care: yes   [  ] Home medications that are not reordered and reason why: none      Spoke with Dr Moon Hernandez regarding transfer @ [phone]  Patient accepted to their service      [address]

## 2020-01-28 NOTE — PLAN OF CARE
Problem: Prexisting or High Potential for Compromised Skin Integrity  Goal: Skin integrity is maintained or improved  Description  INTERVENTIONS:  - Identify patients at risk for skin breakdown  - Assess and monitor skin integrity  - Assess and monitor nutrition and hydration status  - Monitor labs   - Assess for incontinence   - Turn and reposition patient  - Assist with mobility/ambulation  - Relieve pressure over bony prominences  - Avoid friction and shearing  - Provide appropriate hygiene as needed including keeping skin clean and dry  - Evaluate need for skin moisturizer/barrier cream  - Collaborate with interdisciplinary team   - Patient/family teaching  - Consider wound care consult   Outcome: Progressing     Problem: PAIN - ADULT  Goal: Verbalizes/displays adequate comfort level or baseline comfort level  Description  Interventions:  - Encourage patient to monitor pain and request assistance  - Assess pain using appropriate pain scale  - Administer analgesics based on type and severity of pain and evaluate response  - Implement non-pharmacological measures as appropriate and evaluate response  - Consider cultural and social influences on pain and pain management  - Notify physician/advanced practitioner if interventions unsuccessful or patient reports new pain  Outcome: Progressing     Problem: INFECTION - ADULT  Goal: Absence or prevention of progression during hospitalization  Description  INTERVENTIONS:  - Assess and monitor for signs and symptoms of infection  - Monitor lab/diagnostic results  - Monitor all insertion sites, i e  indwelling lines, tubes, and drains  - Monitor endotracheal if appropriate and nasal secretions for changes in amount and color  - [place] appropriate cooling/warming therapies per order  - Administer medications as ordered  - Instruct and encourage patient and family to use good hand hygiene technique  - Identify and instruct in appropriate isolation precautions for identified infection/condition  Outcome: Progressing  Goal: Absence of fever/infection during neutropenic period  Description  INTERVENTIONS:  - Monitor WBC    Outcome: Progressing     Problem: SAFETY ADULT  Goal: Patient will remain free of falls  Description  INTERVENTIONS:  - Assess patient frequently for physical needs  -  Identify cognitive and physical deficits and behaviors that affect risk of falls    -  [place] fall precautions as indicated by assessment   - Educate patient/family on patient safety including physical limitations  - Instruct patient to call for assistance with activity based on assessment  - Modify environment to reduce risk of injury  - Consider OT/PT consult to assist with strengthening/mobility  Outcome: Progressing  Goal: Maintain or return to baseline ADL function  Description  INTERVENTIONS:  -  Assess patient's ability to carry out ADLs; assess patient's baseline for ADL function and identify physical deficits which impact ability to perform ADLs (bathing, care of mouth/teeth, toileting, grooming, dressing, etc )  - Assess/evaluate cause of self-care deficits   - Assess range of motion  - Assess patient's mobility; develop plan if impaired  - Assess patient's need for assistive devices and provide as appropriate  - Encourage maximum independence but intervene and supervise when necessary  - Involve family in performance of ADLs  - Assess for home care needs following discharge   - Consider OT consult to assist with ADL evaluation and planning for discharge  - Provide patient education as appropriate  Outcome: Progressing  Goal: Maintain or return mobility status to optimal level  Description  INTERVENTIONS:  - Assess patient's baseline mobility status (ambulation, transfers, stairs, etc )    - Identify cognitive and physical deficits and behaviors that affect mobility  - Identify mobility aids required to assist with transfers and/or ambulation (gait belt, sit-to-stand, lift, walker, cane, etc )  - [place] fall precautions as indicated by assessment  - Record patient progress and toleration of activity level on Mobility SBAR; progress patient to next Phase/Stage  - Instruct patient to call for assistance with activity based on assessment  - Consider rehabilitation consult to assist with strengthening/weightbearing, etc   Outcome: Progressing     Problem: DISCHARGE PLANNING  Goal: Discharge to home or other facility with appropriate resources  Description  INTERVENTIONS:  - Identify barriers to discharge w/patient and caregiver  - Arrange for needed discharge resources and transportation as appropriate  - Identify discharge learning needs (meds, wound care, etc )  - Arrange for interpretive services to assist at discharge as needed  - Refer to Case Management Department for coordinating discharge planning if the patient needs post-hospital services based on physician/advanced practitioner order or complex needs related to functional status, cognitive ability, or social support system  Outcome: Progressing     Problem: Knowledge Deficit  Goal: Patient/family/caregiver demonstrates understanding of disease process, treatment plan, medications, and discharge instructions  Description  Complete learning assessment and assess knowledge base  Interventions:  - Provide teaching at level of understanding  - Provide teaching via preferred learning methods  Outcome: Progressing     Problem: Potential for Falls  Goal: Patient will remain free of falls  Description  INTERVENTIONS:  - Assess patient frequently for physical needs  -  Identify cognitive and physical deficits and behaviors that affect risk of falls    -  [place] fall precautions as indicated by assessment   - Educate patient/family on patient safety including physical limitations  - Instruct patient to call for assistance with activity based on assessment  - Modify environment to reduce risk of injury  - Consider OT/PT consult to assist with strengthening/mobility  Outcome: Progressing     Problem: METABOLIC, FLUID AND ELECTROLYTES - ADULT  Goal: Electrolytes maintained within normal limits  Description  INTERVENTIONS:  - Monitor labs and assess patient for signs and symptoms of electrolyte imbalances  - Administer electrolyte replacement as ordered  - Monitor response to electrolyte replacements, including repeat lab results as appropriate  - Instruct patient on fluid and nutrition as appropriate  Outcome: Progressing  Goal: Fluid balance maintained  Description  INTERVENTIONS:  - Monitor labs   - Monitor I/O and WT  - Instruct patient on fluid and nutrition as appropriate  - Assess for signs & symptoms of volume excess or deficit  Outcome: Progressing     Problem: NEUROSENSORY - ADULT  Goal: Achieves stable or improved neurological status  Description  INTERVENTIONS  - Monitor and report changes in neurological status  - Monitor vital signs such as temperature, blood pressure, glucose, and any other labs ordered   - Initiate measures to prevent increased intracranial pressure  - Monitor for seizure activity and implement precautions if appropriate      Outcome: Progressing  Goal: Remains free of injury related to seizures activity  Description  INTERVENTIONS  - Maintain airway, patient safety  and administer oxygen as ordered  - Monitor patient for seizure activity, document and report duration and description of seizure to physician/advanced practitioner  - If seizure occurs,  ensure patient safety during seizure  - Reorient patient post seizure  - Seizure pads on all 4 side rails  - Instruct patient/family to notify RN of any seizure activity including if an aura is experienced  - Instruct patient/family to call for assistance with activity based on nursing assessment  - Administer anti-seizure medications if ordered    Outcome: Progressing  Goal: Achieves maximal functionality and self care  Description  INTERVENTIONS  - Monitor swallowing and airway patency with patient fatigue and changes in neurological status  - Encourage and assist patient to increase activity and self care  - Encourage visually impaired, hearing impaired and aphasic patients to use assistive/communication devices  Outcome: Progressing     Problem: RESPIRATORY - ADULT  Goal: Achieves optimal ventilation and oxygenation  Description  INTERVENTIONS:  - Assess for changes in respiratory status  - Assess for changes in mentation and behavior  - Position to facilitate oxygenation and minimize respiratory effort  - Oxygen administered by appropriate delivery if ordered  - Initiate smoking cessation education as indicated  - Encourage broncho-pulmonary hygiene including cough, deep breathe, Incentive Spirometry  - Assess the need for suctioning and aspirate as needed  - Assess and instruct to report SOB or any respiratory difficulty  - Respiratory Therapy support as indicated  Outcome: Progressing     Problem: Nutrition/Hydration-ADULT  Goal: Nutrient/Hydration intake appropriate for improving, restoring or maintaining nutritional needs  Description  Monitor and assess patient's nutrition/hydration status for malnutrition  Collaborate with interdisciplinary team and initiate plan and interventions as ordered  Monitor patient's weight and dietary intake as ordered or per policy  Utilize nutrition screening tool and intervene as necessary  Determine patient's food preferences and provide high-protein, high-caloric foods as appropriate       INTERVENTIONS:  - Monitor oral intake, urinary output, labs, and treatment plans  - Assess nutrition and hydration status and recommend course of action  - Evaluate amount of meals eaten  - Assist patient with eating if necessary   - Allow adequate time for meals  - Recommend/ encourage appropriate diets, oral nutritional supplements, and vitamin/mineral supplements  - Order, calculate, and assess calorie counts as needed  - Recommend, monitor, and adjust tube feedings and TPN/PPN based on assessed needs  - Assess need for intravenous fluids  - Provide specific nutrition/hydration education as appropriate  - Include patient/family/caregiver in decisions related to nutrition  Outcome: Progressing

## 2020-01-28 NOTE — ASSESSMENT & PLAN NOTE
· The patient arrived as a transfer from [facility] after he was found down in a park and pulseless  Given his need for critical care management, he is admitted to the ICU  This will require greater then a 2 midnight stay, therefore the patient will be placed in inpatient status  · Troponin trend 0 06, 0 06, 0 07, 0 07  · Echo-EF-61%, mod hypokinesis of basal mid inferolateral walls, grade 2 diastolic dysfunction  · He had an initial temp of 92 and was asystolic, TTM deferred  · Given lack of neurologic recovery the family elected to transition the pt to Comfort Measures only  ·  Continue morphine gtt for comfort

## 2020-01-28 NOTE — PROGRESS NOTES
Progress Note [facility] 1960, 64 y o  male MRN: [id number]    Unit/Bed#: ICU 09 Encounter: [id number]    Primary Care Provider: No primary care provider on file  Date and time admitted to hospital: 1/21/2020 11:30 PM        * Cardiac arrest [facility])  Assessment & Plan  · The patient arrived as a transfer from [address] [place] [address] after he was found down in a park and pulseless  Given his need for critical care management, he is admitted to the ICU  This will require greater then a 2 midnight stay, therefore the patient will be placed in inpatient status  · Troponin trend 0 06, 0 06, 0 07, 0 07  · Echo-EF-61%, mod hypokinesis of basal mid inferolateral walls, grade 2 diastolic dysfunction  · He had an initial temp of 92 and was asystolic, TTM deferred  · Given lack of neurologic recovery the family elected to transition the pt to Comfort Measures only  ·  Continue morphine gtt for comfort  Acute respiratory failure ([address] )  Assessment & Plan  · Pt was terminally extubated after transition to comfort care      Hyperglycemia  Assessment & Plan  · It is unknown whether the patient has diabetes but his blood sugar was elevated on admission  · Insulin infusion has been discontinued and glucose levels have normalized    Hypothermia  Assessment & Plan  · Resolved    Azotemia  Assessment & Plan  · Hemodialysis, no off given family wishes for hospice/comfort care    Encephalopathy acute  Assessment & Plan  · Possibly anoxic given unknown downtime  · There was also some evidence of myoclonus with spontaneous eye opening and muscle twitching   · EEG shows diffuse slowing, post-hypoxic myoclonus activity, no electrographic seizures  · Keppra discontinued  · Severe and ongoing encephalopathy, no signs of neurologic recovery      ----------------------------------------------------------------------------------------  HPI/24hr events: periods of apnea and asystole overnight    Disposition: Transfer to [facility] Status: Level 4 - Comfort Care  ---------------------------------------------------------------------------------------  SUBJECTIVE  unrepsonsive    Review of Systems  Review of systems was unable to be performed secondary to mental status  ---------------------------------------------------------------------------------------  OBJECTIVE    Vitals   Vitals:    20 1456 20 1902 20 2200 20 0000   BP:       BP Location:       Pulse:  (!) 38 (!) 34 (!) 36   Resp:       Temp: (!) 95 °F (35 °C)      TempSrc: Temporal      SpO2:       Weight:         Temp (24hrs), Av 2 °F (35 1 °C), Min:95 °F (35 °C), Max:95 4 °F (35 2 °C)  Current: Temperature: (!) 95 °F (35 °C)  Arterial Line BP: 154/62  Arterial Line MAP (mmHg): 90 mmHg  SpO2 Device: O2 Device: Ventilator       Physical Exam   Constitutional: He appears well-developed and well-nourished  Eyes:   Pupils are sluggish to react   Neck: Neck supple  Cardiovascular: Bradycardia present  Pulmonary/Chest: He has rales  Periods of apnea   Abdominal: Soft  Bowel sounds are normal  He exhibits no distension  Musculoskeletal: He exhibits edema  Lymphadenopathy:     He has no cervical adenopathy  Neurological:   unrepsonsive   Skin: Skin is warm and dry         Invasive/non-invasive ventilation settings   Respiratory    Lab Data (Last 4 hours)    None         O2/Vent Data (Last 4 hours)    None                Laboratory and Diagnostics:  Results from last 7 days   Lab Units 20  0403 20  0429 20  1948 20  1255 20  0455 20  2355 20  1648   WBC Thousand/uL 7 58 8 04  --   --  6 06 6 99 6 30   HEMOGLOBIN g/dL 7 4* 7 2* 7 2* 7 5* 6 9* 6 5* 7 6*   HEMATOCRIT % 24 1* 23 4*  --   --  22 9* 22 2* 26 4*   PLATELETS Thousands/uL 165 164  --   --  167 190 223   NEUTROS PCT % 77* 77*  --   --  83* 83*  --    MONOS PCT % 12 11  --   --  11 12  --    MONO PCT %  --   --   --   --   --   --  11*     Results from last 7 days   Lab Units 01/24/20  1540 01/24/20  0403 01/23/20  0429 01/22/20  1948 01/22/20  1254 01/22/20  0455 01/21/20  2355 01/21/20  1648   SODIUM mmol/L 143 143 142 142 142 141 143 139   POTASSIUM mmol/L 4 0 4 2 5 1 5 3 6 5* 6 6* 6 9* 6 6*   CHLORIDE mmol/L 108 106 109* 109* 111* 113* 114* 113*   CO2 mmol/L 27 25 24 23 19* 18* 18* 12*   ANION GAP mmol/L 8 12 9 10 12 10 11 14   BUN mg/dL 67* 60* 77 73* 92* 93* 86* 81*   CREATININE mg/dL 3 62* 3 15* 3 23 2 92* 3 33* 3 07* 3 04* 3 04*   CALCIUM mg/dL 7 1* 7 3* 7 0* 7 0* 7 2* 7 1* 6 9* 7 9*   GLUCOSE RANDOM mg/dL 179* 130 110 112 106 47* 89 364*   ALT U/L 20  --   --   --   --   --  39 27   AST U/L 47*  --   --   --   --   --  43 22   ALK PHOS U/L 104  --   --   --   --   --  129* 126*   ALBUMIN g/dL 2 1*  --   --   --   --   --  2 8* 3 6   TOTAL BILIRUBIN mg/dL 0 35  --   --   --   --   --  0 33 0 30     Results from last 7 days   Lab Units 01/23/20  0429 01/22/20  0455 01/21/20  2355   MAGNESIUM mg/dL 1 9 2 1 2 1   PHOSPHORUS mg/dL 5 0*  --   --       Results from last 7 days   Lab Units 01/21/20  2355   INR  1 20*      Results from last 7 days   Lab Units 01/22/20  0455 01/22/20  0039 01/21/20  2355 01/21/20  1648   TROPONIN I ng/mL 0 07* 0 07* 0 06* 0 06*     Results from last 7 days   Lab Units 01/21/20  2355 01/21/20  1928   LACTIC ACID mmol/L 1 3 1 6     ABG:  Results from last 7 days   Lab Units 01/24/20  1621   PH ART  7 457*   PCO2 ART mm Hg 35 4*   PO2 ART mm Hg 334 2*   HCO3 ART mmol/L 24 4   BASE EXC ART mmol/L 0 8   ABG SOURCE  Line, Arterial     VBG:  Results from last 7 days   Lab Units 01/24/20  1621   ABG SOURCE  Line, Arterial     Results from last 7 days   Lab Units 01/24/20  1148 01/21/20  2355   PROCALCITONIN ng/ml 0 42* 0 23       Micro        EKG:   Imaging: I have personally reviewed pertinent reports     and I have personally reviewed pertinent films in PACS    Intake and Output  I/O       01/26 0701 - 01/27 0700 01/27 0701 - 01/28 0700 I V  (mL/kg) 25 (0 2) 1 (0)    Total Intake(mL/kg) 25 (0 2) 1 (0)    Urine (mL/kg/hr) 5 (0) 10 (0)    Total Output 5 10    Net +20 -9                Height and Weights      IBW: -88 kg  There is no height or weight on file to calculate BMI  Weight (last 2 days)     None            Nutrition        Active Medications  Scheduled Meds:  Current Facility-Administered Medications:  acetaminophen 650 mg Oral Q6H PRN Bryant Fair Spatzer, CRNP    morphine 5 mg/hr Intravenous Continuous Bryant Fair Spatzer, CRNP Last Rate: 10 mg/hr (01/28/20 0400)     Continuous Infusions:    morphine 5 mg/hr Last Rate: 10 mg/hr (01/28/20 0400)     PRN Meds:     acetaminophen 650 mg Q6H PRN     ---------------------------------------------------------------------------------------  Advance Directive and Living Will:      Power of :    POLST:    ---------------------------------------------------------------------------------------  Counseling / Coordination of Torri Mercer 16, CRNP      Portions of the record may have been created with voice recognition software  Occasional wrong word or "sound a like" substitutions may have occurred due to the inherent limitations of voice recognition software    Read the chart carefully and recognize, using context, where substitutions have occurred

## 2020-01-28 NOTE — NURSING NOTE
1930- assumed care of patient  Pt on monitor, comfort care measures  Morphine gtt infusing  Mouth care performed  Will continue to monitor

## 2020-01-28 NOTE — PROGRESS NOTES
01/28/20 1100   Clinical Encounter Type   Visited With Patient; Health care provider   Crisis Visit Critical Care;Patient actively dying   Referral From Nurse   Sabianist Encounters   Sabianist Needs [facility] Text;Prayer   Patient Spiritual Encounters   Spiritual Encounter Notes Pt unable to communicate  Prayer, psalms offered

## 2020-01-28 NOTE — PROGRESS NOTES
01/28/20 [id number]  Affiliation Unknown   Spiritual Beliefs/Perceptions   Support Systems Spouse/significant other;Family members   Plan of Care   Comments Pt unable to communicate, no family present  Referral from hosp  supvsr  due to pt status       Assessment Completed by: Unit visit

## 2020-01-28 NOTE — ASSESSMENT & PLAN NOTE
· The patient arrived as a transfer from [facility] on 1/25 after he was found down in a park and pulseless  Given his need for critical care management, he is admitted to the ICU  This will require greater then a 2 midnight stay, therefore the patient will be placed in inpatient status  · Troponin trend 0 06, 0 06, 0 07, 0 07  · Echo-EF-61%, mod hypokinesis of basal mid inferolateral walls, grade 2 diastolic dysfunction  · He had an initial temp of 92 and was asystolic, TTM deferred  · Given lack of neurologic recovery the family elected to transition the pt to Comfort Measures only  ·  Continue morphine gtt for comfort

## 2020-01-29 NOTE — PLAN OF CARE
Problem: Prexisting or High Potential for Compromised Skin Integrity  Goal: Skin integrity is maintained or improved  Description  INTERVENTIONS:  - Identify patients at risk for skin breakdown  - Assess and monitor skin integrity  - Assess and monitor nutrition and hydration status  - Monitor labs   - Assess for incontinence   - Turn and reposition patient  - Assist with mobility/ambulation  - Relieve pressure over bony prominences  - Avoid friction and shearing  - Provide appropriate hygiene as needed including keeping skin clean and dry  - Evaluate need for skin moisturizer/barrier cream  - Collaborate with interdisciplinary team   - Patient/family teaching  - Consider wound care consult   Outcome: Progressing     Problem: PAIN - ADULT  Goal: Verbalizes/displays adequate comfort level or baseline comfort level  Description  Interventions:  - Encourage patient to monitor pain and request assistance  - Assess pain using appropriate pain scale  - Administer analgesics based on type and severity of pain and evaluate response  - Implement non-pharmacological measures as appropriate and evaluate response  - Consider cultural and social influences on pain and pain management  - Notify physician/advanced practitioner if interventions unsuccessful or patient reports new pain  Outcome: Progressing     Problem: INFECTION - ADULT  Goal: Absence or prevention of progression during hospitalization  Description  INTERVENTIONS:  - Assess and monitor for signs and symptoms of infection  - Monitor lab/diagnostic results  - Monitor all insertion sites, i e  indwelling lines, tubes, and drains  - Monitor endotracheal if appropriate and nasal secretions for changes in amount and color  - [place] appropriate cooling/warming therapies per order  - Administer medications as ordered  - Instruct and encourage patient and family to use good hand hygiene technique  - Identify and instruct in appropriate isolation precautions for identified infection/condition  Outcome: Progressing  Goal: Absence of fever/infection during neutropenic period  Description  INTERVENTIONS:  - Monitor WBC    Outcome: Progressing     Problem: SAFETY ADULT  Goal: Patient will remain free of falls  Description  INTERVENTIONS:  - Assess patient frequently for physical needs  -  Identify cognitive and physical deficits and behaviors that affect risk of falls    -  [place] fall precautions as indicated by assessment   - Educate patient/family on patient safety including physical limitations  - Instruct patient to call for assistance with activity based on assessment  - Modify environment to reduce risk of injury  - Consider OT/PT consult to assist with strengthening/mobility  Outcome: Progressing  Goal: Maintain or return to baseline ADL function  Description  INTERVENTIONS:  -  Assess patient's ability to carry out ADLs; assess patient's baseline for ADL function and identify physical deficits which impact ability to perform ADLs (bathing, care of mouth/teeth, toileting, grooming, dressing, etc )  - Assess/evaluate cause of self-care deficits   - Assess range of motion  - Assess patient's mobility; develop plan if impaired  - Assess patient's need for assistive devices and provide as appropriate  - Encourage maximum independence but intervene and supervise when necessary  - Involve family in performance of ADLs  - Assess for home care needs following discharge   - Consider OT consult to assist with ADL evaluation and planning for discharge  - Provide patient education as appropriate  Outcome: Progressing  Goal: Maintain or return mobility status to optimal level  Description  INTERVENTIONS:  - Assess patient's baseline mobility status (ambulation, transfers, stairs, etc )    - Identify cognitive and physical deficits and behaviors that affect mobility  - Identify mobility aids required to assist with transfers and/or ambulation (gait belt, sit-to-stand, lift, walker, cane, etc )  - [place] fall precautions as indicated by assessment  - Record patient progress and toleration of activity level on Mobility SBAR; progress patient to next Phase/Stage  - Instruct patient to call for assistance with activity based on assessment  - Consider rehabilitation consult to assist with strengthening/weightbearing, etc   Outcome: Progressing     Problem: DISCHARGE PLANNING  Goal: Discharge to home or other facility with appropriate resources  Description  INTERVENTIONS:  - Identify barriers to discharge w/patient and caregiver  - Arrange for needed discharge resources and transportation as appropriate  - Identify discharge learning needs (meds, wound care, etc )  - Arrange for interpretive services to assist at discharge as needed  - Refer to Case Management Department for coordinating discharge planning if the patient needs post-hospital services based on physician/advanced practitioner order or complex needs related to functional status, cognitive ability, or social support system  Outcome: Progressing     Problem: Knowledge Deficit  Goal: Patient/family/caregiver demonstrates understanding of disease process, treatment plan, medications, and discharge instructions  Description  Complete learning assessment and assess knowledge base  Interventions:  - Provide teaching at level of understanding  - Provide teaching via preferred learning methods  Outcome: Progressing     Problem: Potential for Falls  Goal: Patient will remain free of falls  Description  INTERVENTIONS:  - Assess patient frequently for physical needs  -  Identify cognitive and physical deficits and behaviors that affect risk of falls    -  [place] fall precautions as indicated by assessment   - Educate patient/family on patient safety including physical limitations  - Instruct patient to call for assistance with activity based on assessment  - Modify environment to reduce risk of injury  - Consider OT/PT consult to assist with strengthening/mobility  Outcome: Progressing     Problem: METABOLIC, FLUID AND ELECTROLYTES - ADULT  Goal: Electrolytes maintained within normal limits  Description  INTERVENTIONS:  - Monitor labs and assess patient for signs and symptoms of electrolyte imbalances  - Administer electrolyte replacement as ordered  - Monitor response to electrolyte replacements, including repeat lab results as appropriate  - Instruct patient on fluid and nutrition as appropriate  Outcome: Progressing  Goal: Fluid balance maintained  Description  INTERVENTIONS:  - Monitor labs   - Monitor I/O and WT  - Instruct patient on fluid and nutrition as appropriate  - Assess for signs & symptoms of volume excess or deficit  Outcome: Progressing     Problem: NEUROSENSORY - ADULT  Goal: Achieves stable or improved neurological status  Description  INTERVENTIONS  - Monitor and report changes in neurological status  - Monitor vital signs such as temperature, blood pressure, glucose, and any other labs ordered   - Initiate measures to prevent increased intracranial pressure  - Monitor for seizure activity and implement precautions if appropriate      Outcome: Progressing  Goal: Remains free of injury related to seizures activity  Description  INTERVENTIONS  - Maintain airway, patient safety  and administer oxygen as ordered  - Monitor patient for seizure activity, document and report duration and description of seizure to physician/advanced practitioner  - If seizure occurs,  ensure patient safety during seizure  - Reorient patient post seizure  - Seizure pads on all 4 side rails  - Instruct patient/family to notify RN of any seizure activity including if an aura is experienced  - Instruct patient/family to call for assistance with activity based on nursing assessment  - Administer anti-seizure medications if ordered    Outcome: Progressing  Goal: Achieves maximal functionality and self care  Description  INTERVENTIONS  - Monitor swallowing and airway patency with patient fatigue and changes in neurological status  - Encourage and assist patient to increase activity and self care  - Encourage visually impaired, hearing impaired and aphasic patients to use assistive/communication devices  Outcome: Progressing     Problem: RESPIRATORY - ADULT  Goal: Achieves optimal ventilation and oxygenation  Description  INTERVENTIONS:  - Assess for changes in respiratory status  - Assess for changes in mentation and behavior  - Position to facilitate oxygenation and minimize respiratory effort  - Oxygen administered by appropriate delivery if ordered  - Initiate smoking cessation education as indicated  - Encourage broncho-pulmonary hygiene including cough, deep breathe, Incentive Spirometry  - Assess the need for suctioning and aspirate as needed  - Assess and instruct to report SOB or any respiratory difficulty  - Respiratory Therapy support as indicated  Outcome: Progressing     Problem: Nutrition/Hydration-ADULT  Goal: Nutrient/Hydration intake appropriate for improving, restoring or maintaining nutritional needs  Description  Monitor and assess patient's nutrition/hydration status for malnutrition  Collaborate with interdisciplinary team and initiate plan and interventions as ordered  Monitor patient's weight and dietary intake as ordered or per policy  Utilize nutrition screening tool and intervene as necessary  Determine patient's food preferences and provide high-protein, high-caloric foods as appropriate       INTERVENTIONS:  - Monitor oral intake, urinary output, labs, and treatment plans  - Assess nutrition and hydration status and recommend course of action  - Evaluate amount of meals eaten  - Assist patient with eating if necessary   - Allow adequate time for meals  - Recommend/ encourage appropriate diets, oral nutritional supplements, and vitamin/mineral supplements  - Order, calculate, and assess calorie counts as needed  - Recommend, monitor, and adjust tube feedings and TPN/PPN based on assessed needs  - Assess need for intravenous fluids  - Provide specific nutrition/hydration education as appropriate  - Include patient/family/caregiver in decisions related to nutrition  Outcome: Progressing

## 2020-01-29 NOTE — ASSESSMENT & PLAN NOTE
61-year-old male who suffered from a cardiac arrest in the field status post resuscitation x3  Managed initially in the ICU  He was placed on comfort care and was terminal extubated in the ICU  He was downgraded to the floors today and  at 3:56PM  His wife Jacquelin Carr was notified  Medical examiner not accepting case  No autopsy per wife

## 2020-01-29 NOTE — NURSING NOTE
Patient transferred to floor and settled in room  After checking IV pump infusion and orders, morphine infusion was set to 20mg/hr as opposed to ordered 5mg/hr  In house SLIM practitioner aware and stated to changed rate back to ordered amount  Patient resting comfortably, will continue to monitor

## 2020-01-29 NOTE — NURSING NOTE
Patient resting comfortably in bed  Morphine gtt infusing  Mouth care performed, bed pad changed  Patient repositioned Q2H  Will continue to monitor

## 2020-01-29 NOTE — SOCIAL WORK
Cm reviewed pt care coordination rounds  Pt is comfort care on morphine gtt  Hospice referral resent, requesting to have liaison to come talk with family  Cm will f/u

## 2020-01-29 NOTE — NURSING NOTE
Patient resting comfortably  Morphine gtt infusing, refer to [facility]  Mouth care performed and repositioned Q2H  Will continue to monitor

## 2020-01-29 NOTE — PROGRESS NOTES
Pastoral Care Progress Note    2020  Patient: Wallace Frank : 1960  Admission Date & Time: 2020 2330  MRN: [id number] [facility]: [id number]                     Chaplaincy Interventions Utilized:   Empowerment: Clarified, confirmed, or reviewed information from treatment team     Exploration: Explored spiritual needs & resources    Collaboration: Consulted with interdisciplinary team    Relationship Building: Cultivated a relationship of care and support    Ritual: Provided prayer        Chaplaincy Outcomes Achieved:      Spiritual Coping Strategies Utilized:   Spiritual comfort

## 2020-01-29 NOTE — PLAN OF CARE
Problem: Prexisting or High Potential for Compromised Skin Integrity  Goal: Skin integrity is maintained or improved  Description  INTERVENTIONS:  - Identify patients at risk for skin breakdown  - Assess and monitor skin integrity  - Assess and monitor nutrition and hydration status  - Monitor labs   - Assess for incontinence   - Turn and reposition patient  - Assist with mobility/ambulation  - Relieve pressure over bony prominences  - Avoid friction and shearing  - Provide appropriate hygiene as needed including keeping skin clean and dry  - Evaluate need for skin moisturizer/barrier cream  - Collaborate with interdisciplinary team   - Patient/family teaching  - Consider wound care consult   Outcome: Progressing     Problem: PAIN - ADULT  Goal: Verbalizes/displays adequate comfort level or baseline comfort level  Description  Interventions:  - Encourage patient to monitor pain and request assistance  - Assess pain using appropriate pain scale  - Administer analgesics based on type and severity of pain and evaluate response  - Implement non-pharmacological measures as appropriate and evaluate response  - Consider cultural and social influences on pain and pain management  - Notify physician/advanced practitioner if interventions unsuccessful or patient reports new pain  Outcome: Progressing     Problem: INFECTION - ADULT  Goal: Absence or prevention of progression during hospitalization  Description  INTERVENTIONS:  - Assess and monitor for signs and symptoms of infection  - Monitor lab/diagnostic results  - Monitor all insertion sites, i e  indwelling lines, tubes, and drains  - Monitor endotracheal if appropriate and nasal secretions for changes in amount and color  - [place] appropriate cooling/warming therapies per order  - Administer medications as ordered  - Instruct and encourage patient and family to use good hand hygiene technique  - Identify and instruct in appropriate isolation precautions for identified infection/condition  Outcome: Progressing  Goal: Absence of fever/infection during neutropenic period  Description  INTERVENTIONS:  - Monitor WBC    Outcome: Progressing     Problem: SAFETY ADULT  Goal: Patient will remain free of falls  Description  INTERVENTIONS:  - Assess patient frequently for physical needs  -  Identify cognitive and physical deficits and behaviors that affect risk of falls    -  [place] fall precautions as indicated by assessment   - Educate patient/family on patient safety including physical limitations  - Instruct patient to call for assistance with activity based on assessment  - Modify environment to reduce risk of injury  - Consider OT/PT consult to assist with strengthening/mobility  Outcome: Progressing  Goal: Maintain or return to baseline ADL function  Description  INTERVENTIONS:  -  Assess patient's ability to carry out ADLs; assess patient's baseline for ADL function and identify physical deficits which impact ability to perform ADLs (bathing, care of mouth/teeth, toileting, grooming, dressing, etc )  - Assess/evaluate cause of self-care deficits   - Assess range of motion  - Assess patient's mobility; develop plan if impaired  - Assess patient's need for assistive devices and provide as appropriate  - Encourage maximum independence but intervene and supervise when necessary  - Involve family in performance of ADLs  - Assess for home care needs following discharge   - Consider OT consult to assist with ADL evaluation and planning for discharge  - Provide patient education as appropriate  Outcome: Progressing  Goal: Maintain or return mobility status to optimal level  Description  INTERVENTIONS:  - Assess patient's baseline mobility status (ambulation, transfers, stairs, etc )    - Identify cognitive and physical deficits and behaviors that affect mobility  - Identify mobility aids required to assist with transfers and/or ambulation (gait belt, sit-to-stand, lift, walker, cane, etc )  - [place] fall precautions as indicated by assessment  - Record patient progress and toleration of activity level on Mobility SBAR; progress patient to next Phase/Stage  - Instruct patient to call for assistance with activity based on assessment  - Consider rehabilitation consult to assist with strengthening/weightbearing, etc   Outcome: Progressing

## 2020-01-29 NOTE — HOSPICE NOTE
Received re-referral on patient for hospice services  Patient was transferred out of ICU yesterday to med surg floor  This liaison spoke with Dr Kalani Mcfadden and [facility] while the patient was still in ICU and it was determined that he would stay in the hospital under comfort care  Family was updated and in agreement based on this aforementioned decision  FARHAN Washington and Dr Carie Quinones updated  Thank you

## 2020-01-29 NOTE — DISCHARGE SUMMARY
Discharge- Rj Vale 1960, 64 y o  male MRN: [id number]    Unit/Bed#: E4 -01 Encounter: [id number]    Primary Care Provider: No primary care provider on file  Date and time admitted to hospital: 2020 11:30 PM        * Cardiac arrest [facility])  Assessment & Plan  61-year-old male who suffered from a cardiac arrest in the field status post resuscitation x3  Managed initially in the ICU  He was placed on comfort care and was terminal extubated in the ICU  He was downgraded to the floors today and  at 3:56PM  His wife Denny Ennis was notified  Medical examiner not accepting case  No autopsy per wife  Discharging Physician / Practitioner: Vincent Garcia MD  PCP: No primary care provider on file  Admission Date:   Admission Orders (From admission, onward)     Ordered        20 2343  Inpatient Admission  Once                   Discharge Date: 20    Resolved Problems  Date Reviewed: 2020          Resolved    Metabolic acidosis      Resolved by  Marthe Leventhal, DO    Hypocalcemia 2020     Resolved by  Marthe Leventhal, DO          Consultations During Hospital Stay:  · Neurology, Nephrology, Medical toxicology    Procedures Performed:   · Central line, Intubation    Significant Findings / Test Results:   · XR Chest: See epic  · CT Head: No early signs of hypoxic ischemic injury secondary to cardiac arrest   Close follow-up imaging recommended  Incidental Findings:   · See epic    Test Results Pending at Discharge (will require follow up):    · None     Outpatient Tests Requested:  ·     Complications:      Reason for Admission: Cardiac arrest    Hospital Course:     Rj Vale is a 64 y o  male patient who originally presented to the hospital on 2020 due to Cardiac arrest    Per ICU Note:  "HPI: 64year-old male who presented as a transfer from [facility] after he was found in a park down by bystanders  Jass Vidal was called and the patient was found to be asystolic and in cardiac arrest   CPR was initiated and the patient received several rounds of ACLS medications   On his arrival to the emergency department and at [facility] he was found to have return of spontaneous circulation   The patient was noted to have an initial temperature of 92°   Additionally he was found to have hyperkalemia and evidence of renal failure   In addition the patient was found to be hyperglycemic  He was transferred to [facility] intensive care unit for higher level of care         Summary of Hospital Course: After admission to [facility] ICU the patient never regained consciousness  [facility] underwent hemodialysis with improvement of his hyperkalemia and renal function   Patient was also seen by Neurology who estimated the patient's prognosis for recovery was fairly Arsalan Singh patient's wife monitored his condition and on 2020 elected to terminally extubate and withdraw care in favor of pursuing comfort as the ultimate goal to allow a natural death "    Comfort care was continued on the general floor  Patient was pronounced dead 3:56PM 2020  Wife Taco Campa was informed  No autopsy  Medical examiner informed Ksenia Green  Patient not a candidate for ME evaluation  Final Diagnosis: Cardiac arrest, acute respiratory failure, anoxic encephalopathy, acute renal failure    Please see above list of diagnoses and related plan for additional information  Condition at Discharge:      Discharge Day Visit / Exam:     Subjective:  Patient  at 15:56 [facility] 2020  Wife Taco Campa notified    Vitals:    No Blood pressure, Heart rate, or respiration    Exam:   Unresponsive to noxious stimuli, Spontaneous respirations absent, Breath sounds absent, Carotid pulse absent, Heart sounds absent, Pupillary light reflex absent and Corneal blink reflex absent    Discussion with Family: Gallo Campa    Disposition:     Other:     For Discharges to [facility]:   · Not Applicable to this Patient - Not Applicable to this Patient    Planned Readmission: No     Discharge Statement:  I spent 50 minutes discharging the patient  This time was spent on the day of discharge  I had direct contact with the patient on the day of discharge  Greater than 50% of the total time was spent examining patient, answering all patient questions, arranging and discussing plan of care with patient as well as directly providing post-discharge instructions  Additional time then spent on discharge activities  Discharge Medications:  See after visit summary for reconciled discharge medications provided to patient and family        ** Please Note: This note has been constructed using a voice recognition system **

## 2020-01-29 NOTE — DEATH NOTE
INPATIENT DEATH NOTE  Jimy Andrews 64 y o  male MRN: [id number]  Unit/Bed#: E4 -01 Encounter: [id number]         Patient's Information  Pronounced by: Graeme Hansen MD   Did the patient's death occur in the ED?: No  Did the patient's death occur in the OR?: No  Did the patient's death occur less than 10 days post-op?: No  Did the patient's death occur within 24 hours of admission?: No  Was code status DNR at the time of death?: Yes(comfort care )    PHYSICAL EXAM:  Unresponsive to noxious stimuli, Spontaneous respirations absent, Breath sounds absent, Carotid pulse absent, Heart sounds absent, Pupillary light reflex absent and Corneal blink reflex absent    Medical Examiner notification criteria:  Death in which attending physician has no adequate or reasonable explanation of the cause of death   Medical Examiner's office notified?:  Yes   Medical Examiner accepted case?:  No  Name of Medical Examiner: Norma Martins        TIME OF DEATH: 2020  15:56    Autopsy Options:  Post-mortem examination declined by next of kin    Primary Service Attending Physician notified?:  yes - Attending:  Graeme Hansen MD    Physician/Resident responsible for completing Discharge Summary:  Attending

## 2021-08-31 NOTE — PLAN OF CARE
GENITOURINARY - ADULT     Maintains or returns to baseline urinary function Progressing     Absence of urinary retention Progressing        INFECTION - ADULT     Absence or prevention of progression during hospitalization Progressing     Absence of fever/infection during neutropenic period Progressing        PAIN - ADULT     Verbalizes/displays adequate comfort level or baseline comfort level Progressing        Potential for Falls     Patient will remain free of falls Progressing        Prexisting or High Potential for Compromised Skin Integrity     Skin integrity is maintained or improved Progressing        SAFETY ADULT     Patient will remain free of falls Progressing     Maintain or return to baseline ADL function Progressing     Maintain or return mobility status to optimal level Progressing 5

## 2025-01-05 NOTE — SOCIAL WORK
Located an ex[place] [phone] [facility], he reports that his mother, Linda Barker and the patient were twice  and   He reports the patient has two biological children - Aguilar David (sp?) who is approx 36yo and Thanh who is approx 8yo  Melanie Hams is attempting to reach his mother - the patients ex-wife to further assist with finding the patients adult son, Wilber Rho  Diagnosis

## 2025-05-05 NOTE — QUICK NOTE
Progress Note - ICU Transfer to SD/MS tele/MS   Miki Carrera 64 y o  male MRN: [id number]  [address] [place] [address]   Unit/Bed#: ICU 12 Encounter: [id number]    Code Status: Level 4 - Comfort Care  Referring Physician: Alejandro Sampson  Accepting Physician: Marci Hatch  _____________________________________________________________________    Reason for ICU/SD admission:  51-year-old male was [place] unresponsive in a park by bystanders  EMS was activated the patient was transported to [facility]  Patient was found to be in cardiac arrest he was asystolic  ACLS was initiated  Upon arrival to [facility] the patient had achieved ROSC  He was hypothermic and hyperkalemic in renal failure, additionally he was noted to be hyperglycemic  He was transferred to [address] [address] intensive care unit for higher level of care  The patient suffered his initial insult on 21 January 2020 he arrived in the early morning hours of 22 January 2020  Since that time the patient has never regained consciousness  He underwent hemodialysis with improvement of his hyperkalemia  Patient was also seen by Neurology who estimated the patient's prognosis for recovery was fairly Grim  The patient's wife monitored his condition and on 24 January 2020 elected to terminally extubate and withdraw care  Patient has since that time maintain his airway and breathing on his own  Therefore, we will consult hospice and transfer the patient to the medical-surgical floor as he does not require critical care medicine at this time        Summary of clinical course:  See above     Consultants:  Nephrology and neurology  _____________________________________________________________________    Diagnostic Data:  CBC:  Results from last 7 days   Lab Units 01/24/20  0403   WBC Thousand/uL 7 58   HEMOGLOBIN g/dL 7 4*   HEMATOCRIT % 24 1*   PLATELETS Thousands/uL 165      CMP:   Lab Results   Component Value Date SODIUM 143 01/24/2020    K 4 0 01/24/2020     01/24/2020    CO2 27 01/24/2020    BUN 67 (H) 01/24/2020    CREATININE 3 62 (H) 01/24/2020    CALCIUM 7 1 (L) 01/24/2020    AST 47 (H) 01/24/2020    ALT 20 01/24/2020    ALKPHOS 104 01/24/2020    EGFR 18 01/24/2020   ,   PT/INR: No results found for: PT, INR,   Magnesium: No components found for: MAG,  Phosphorous: No results found for: PHOS    ABG:   Lab Results   Component Value Date    PHART 7 457 (H) 01/24/2020    GWT7JUX 35 4 (L) 01/24/2020    PO2ART 334 2 (H) 01/24/2020    MZE9SSV 24 4 01/24/2020    BEART 0 8 01/24/2020    SOURCE Line, Arterial 01/24/2020   ,     Microbiology:  No pending microbiology    Imaging: I have personally reviewed the pertinent imaging studies on the PACS system  Chest x-ray done 24 January 2020 demonstrates right-sided IJ with the tip in the SVC feeding tube in place endotracheal tube above the crystal  Stable patchy airspace disease bilaterally likely on the basis of pulmonary edema    Cardiac/EKG/telemetry/Echo:   Results from last 7 days   Lab Units 01/22/20  0455 01/22/20  0039 01/21/20  2355 01/21/20  1648   CK TOTAL U/L 418*  --   --   --    TROPONIN I ng/mL 0 07* 0 07* 0 06* 0 06*   CK MB INDEX % 3 1*  --   --   --    NT-PRO BNP pg/mL  --   --   --  12,200*     Normal sinus rhythm  _____________________________________________________________________    Recent or scheduled procedures:  Central line placement, hemodialysis    Outstanding/pending diagnostics:  None     Mobilization Plan:  No    Comfort care medications only    Spoke with Dr River Gama  regarding transfer  Please call [phone] with any questions or concerns  Portions of the record may have been created with voice recognition software  Occasional wrong word or "sound a like" substitutions may have occurred due to the inherent limitations of voice recognition software    Read the chart carefully and recognize, using context, where substitutions have occurred      JOURDAN Choi 6 (moderate pain)

## 2025-05-30 NOTE — ED NOTES
monique at bedside preparing pt for transport       Talha Hinton RN  01/21/20 3204 unable to follow commands